# Patient Record
Sex: FEMALE | Race: WHITE | NOT HISPANIC OR LATINO | Employment: OTHER | URBAN - METROPOLITAN AREA
[De-identification: names, ages, dates, MRNs, and addresses within clinical notes are randomized per-mention and may not be internally consistent; named-entity substitution may affect disease eponyms.]

---

## 2019-12-20 ENCOUNTER — OFFICE VISIT CONVERTED (OUTPATIENT)
Dept: NEUROLOGY | Facility: CLINIC | Age: 69
End: 2019-12-20
Attending: PSYCHIATRY & NEUROLOGY

## 2019-12-20 ENCOUNTER — CONVERSION ENCOUNTER (OUTPATIENT)
Dept: NEUROLOGY | Facility: CLINIC | Age: 69
End: 2019-12-20

## 2020-07-29 ENCOUNTER — OFFICE VISIT CONVERTED (OUTPATIENT)
Dept: GASTROENTEROLOGY | Facility: CLINIC | Age: 70
End: 2020-07-29
Attending: NURSE PRACTITIONER

## 2020-07-29 ENCOUNTER — HOSPITAL ENCOUNTER (OUTPATIENT)
Dept: LAB | Facility: HOSPITAL | Age: 70
Discharge: HOME | End: 2020-07-29
Attending: NURSE PRACTITIONER

## 2020-08-01 LAB — CONV CELIAC DISEASE AB-IGA: 341 MG/DL (ref 68–408)

## 2020-08-02 LAB — TTG IGA SER-ACNC: <2 U/ML (ref 0–3)

## 2020-08-21 ENCOUNTER — HOSPITAL ENCOUNTER (OUTPATIENT)
Dept: GENERAL RADIOLOGY | Facility: HOSPITAL | Age: 70
Discharge: HOME | End: 2020-08-21
Attending: NURSE PRACTITIONER

## 2020-08-26 ENCOUNTER — HOSPITAL ENCOUNTER (OUTPATIENT)
Dept: GENERAL RADIOLOGY | Facility: HOSPITAL | Age: 70
Discharge: HOME | End: 2020-08-26
Attending: NURSE PRACTITIONER

## 2020-09-16 ENCOUNTER — HOSPITAL ENCOUNTER (OUTPATIENT)
Dept: NUCLEAR MEDICINE | Facility: HOSPITAL | Age: 70
Discharge: HOME | End: 2020-09-16
Attending: NURSE PRACTITIONER

## 2021-05-10 NOTE — H&P
History and Physical      Patient Name: Fanta Hawkins   Patient ID: [id number]   Sex: Female   YOB: 1950    Primary Care Provider: Valentin Minaya MD   Referring Provider: Valentin Minaya MD    Visit Date: July 29, 2020    Provider: JIN Gonsalez   [place] [facility] [place]   [place] Address: [address] [place] [address]  [id number]   Location Phone: [phone]          Chief Complaint  · nausea   · constipation       History Of Present Illness  Fanta Hawkins is a 69 year old /White female who presents to the office today.      New pt presents w c/o nausea x 1 yr and chronic constipation.   NO vomiting, states she awakes during night and is nauseated, also occurs in day occasionally. NO abd pain, c/o HB and acid reflux on Omeprazole 10 mg for about a year; takes Diclofenac for arthritis for several yrs. Pt states she cannot go without this.   Pt has tried Miralax and it works well if she takes daily, sometimes she forgets. May go 1 week w/o BM.  Has had very hard stools this week and small amounts. NO blood seen in stool.   Pt does have hx of colon polyps.     Past medical/surgical history significant for distal rectal perforation without peritoneal contamination in 2012 after a colonoscopy.  CT the abdomen pelvis with contrast May 2019 showed a partial small bowel obstruction  Labs CBC CMP unremarkable 6/2020       Past Medical History  Anxiety; Arthritis; Bladder Disorder; Bowel Obstruction; Colon polyp; COPD; Depression; IBS (irritable bowel syndrome); Leg pain; Limb Swelling; Lung disease; Migraine; Muscle cramps; Numbness and tingling; Reflux; Seasonal allergies         Past Surgical History  Back; Bladder Repair; bowel obstruction; Bowel Surgery; Breast biopsy; Colonoscopy; Hysterectomy; Surgical Clips         Medication List  Name Date Started Instructions   albuterol sulfate 90 mcg/actuation inhalation HFA aerosol inhaler  inhale 1 puff (90 mcg) by  "inhalation route every 6 hours as needed   buspirone 5 mg oral tablet  take 1 tablet (5 mg) by oral route 2 times per day   diclofenac sodium 50 mg oral tablet,delayed release (DR/EC)  take 1 tablet by oral route daily   Flonase Allergy Relief 50 mcg/actuation nasal spray,suspension  --    omeprazole 10 mg oral capsule,delayed release(DR/EC)  take 1 capsule by oral route daily   sertraline 25 mg oral tablet  take 1 tablet (25 mg) by oral route once daily         Allergy List  Celebrex; morphine         Family Medical History  Diabetes, unspecified type; Family history of certain chronic disabling diseases; arthritis; Osteoporosis; No family history of colorectal cancer; Family history of heart disease         Social History  Alcohol (Never); Claustophobic (Unknown); lives alone; Tobacco (Current every day);          Review of Systems  · Constitutional  o Admits  o : good general health lately, no acute distress  · Eyes  o Admits  o : cataracts  o Denies  o : glaucoma  · HENT  o Denies  o : hearing problems, trouble swallowing  · Cardiovascular  o Denies  o : chest pain, blood clots or phlebitis  · Respiratory  o Denies  o : asthma, shortness of breath  · Gastrointestinal  o Denies  o : additional gastrointestinal symptoms except as noted in the HPI  · Genitourinary  o Admits  o : kidney stone  o Denies  o : dysuria  · Integument  o Denies  o : rashes, sores  · Neurologic  o Admits  o : pt states she had a seizure with taking Avelox  o Denies  o : strokes, seizure activity  · Musculoskeletal  o Admits  o : arthritis, bone or joint pain  · Psychiatric  o Admits  o : anxiety, pleasant affect  · Heme-Lymph  o Denies  o : bleeding disorder  · Allergic-Immunologic  o Admits  o : seasonal allergies      Vitals  Date Time BP Position Site L\R Cuff Size HR RR TEMP (F) WT  HT  BMI kg/m2 BSA m2 O2 Sat        12/20/2019 11:10 /75 Sitting    85 - R   99lbs 0oz 5'  2\" 18.11 1.4     07/29/2020 09:29 /78 Sitting  " "  82 - R  98 99lbs 4oz 5'  2\" 18.15 1.4           Physical Examination  · Constitutional  o Appearance  o : well developed, well-nourished, in no acute distress  · Head and Face  o Head  o :   § Inspection  § : atraumatic, normocephalic  · Eyes  o Sclerae  o : sclerae white, no sclerae icterus  · Neck  o Inspection/Palpation  o : supple  · Respiratory  o Respiratory Effort  o : breathing unlabored  o Inspection of Chest  o : normal appearance, no retractions  o Auscultation of Lungs  o : normal breath sounds throughout bilaterally  · Cardiovascular  o Heart  o :   § Auscultation of Heart  § : regular rate, normal rhythm, no murmurs present, no pericardial friction rub  o Peripheral Vascular System  o :   § Extremities  § : no cyanosis, clubbing or edema  · Gastrointestinal  o Abdominal Examination  o : soft, nontender to palpation  · Skin and Subcutaneous Tissue  o General Inspection  o : no lesions present, no rashes present  · Neurologic  o Mental Status Examination  o :   § Orientation  § : grossly oriented to person, place and time  § Speech/Language  § : communication ability within normal limits, voice quality normal, articulation of speech normal, no evidence of aphasia  § Attention  § : attention normal, concentration abilities normal  o Sensation  o : grossly intact  o Gait and Station  o :   § Gait Screening  § : normal gait  · Psychiatric  o General  o : Alert and oriented x3  o Mood and Affect  o : Mood and affect are appropriate to circumstances          Assessment  · Constipation     564.00/K59.00  · Heartburn     787.1/R12  · History of colon polyps     V12.72/Z86.010  · Nausea alone     787.02/R11.0  · History of small bowel obstruction     V12.79/Z87.19      Plan  · Orders  o Celiac Disease Reflexive Panel [facility] (CELID) - - 07/29/2020  o Gastric Emptying Study [facility] (14527) - - 07/29/2020  · Medications  o Linzess 72 mcg oral capsule   SIG: take 1 capsule by oral route daily for 30 days take 30 min " before meal   DISP: (30) capsules with 3 refills  Prescribed on 07/29/2020     o omeprazole 10 mg oral capsule,delayed release(/EC)   SIG: take 1 capsule by oral route daily for 30 days   DISP: (30) capsule with 3 refills  Adjusted on 07/29/2020     o Medications have been Reconciled  o Transition of Care or Provider Policy  · Instructions  o Encouraged to follow-up with Primary Care Provider for preventative care.  o Patient was educated/instructed on their diagnosis, treatment and medications prior to discharge from the clinic today.  o Patient instructed to seek medical attention urgently for new or worsening symptoms.  o Follow Up: after testing  o Omeprazole increased to 20 mg/d to see if sx are improved with this. D/W pt NSAIDS can aggravate GI sx but she states she can't stop taking.   o D/W pt at length R/B of EGD/COLONOSCOPY and that these procedures were recommended w her CC and hx of colon polyps but she wants to think about it; naturally she is concerned since she has had a perforation occur. I also r/w her Dr Hernandez's personal experience with an extremely low rate of complications; risks of unknown/ incurable cancer discussed. I also offered a Cologuard screen but she wanted to think about this as well.             Electronically Signed by: JIN Gonsalez -Author on July 29, 2020 10:27:23 AM

## 2021-05-15 VITALS
DIASTOLIC BLOOD PRESSURE: 75 MMHG | HEIGHT: 62 IN | HEART RATE: 85 BPM | BODY MASS INDEX: 18.22 KG/M2 | SYSTOLIC BLOOD PRESSURE: 133 MMHG | WEIGHT: 99 LBS

## 2021-05-15 VITALS
WEIGHT: 99.25 LBS | BODY MASS INDEX: 18.26 KG/M2 | SYSTOLIC BLOOD PRESSURE: 125 MMHG | HEART RATE: 82 BPM | TEMPERATURE: 98 F | DIASTOLIC BLOOD PRESSURE: 78 MMHG | HEIGHT: 62 IN

## 2021-11-05 ENCOUNTER — OFFICE VISIT (OUTPATIENT)
Dept: CARDIOLOGY | Facility: CLINIC | Age: 71
End: 2021-11-05

## 2021-11-05 VITALS
SYSTOLIC BLOOD PRESSURE: 124 MMHG | DIASTOLIC BLOOD PRESSURE: 70 MMHG | HEART RATE: 82 BPM | WEIGHT: 97 LBS | HEIGHT: 62 IN | BODY MASS INDEX: 17.85 KG/M2

## 2021-11-05 DIAGNOSIS — R07.9 CHEST PAIN, UNSPECIFIED TYPE: Primary | ICD-10-CM

## 2021-11-05 DIAGNOSIS — Z72.0 NICOTINE USE: ICD-10-CM

## 2021-11-05 DIAGNOSIS — R06.02 SHORTNESS OF BREATH: ICD-10-CM

## 2021-11-05 PROCEDURE — 99203 OFFICE O/P NEW LOW 30 MIN: CPT | Performed by: SPECIALIST

## 2021-11-05 PROCEDURE — 93000 ELECTROCARDIOGRAM COMPLETE: CPT | Performed by: SPECIALIST

## 2021-11-05 RX ORDER — HYDROCODONE BITARTRATE AND ACETAMINOPHEN 5; 325 MG/1; MG/1
1 TABLET ORAL EVERY 12 HOURS PRN
COMMUNITY
Start: 2021-10-14

## 2021-11-05 RX ORDER — BUSPIRONE HYDROCHLORIDE 5 MG/1
5 TABLET ORAL 3 TIMES DAILY
COMMUNITY
Start: 2021-10-13 | End: 2022-08-11

## 2021-11-05 RX ORDER — OMEPRAZOLE 10 MG/1
10 CAPSULE, DELAYED RELEASE ORAL DAILY PRN
COMMUNITY
Start: 2021-10-08

## 2021-11-05 NOTE — PROGRESS NOTES
[facility]   Cardiology Consult Note    Patient Name: Fanta Hawkins  : 1950  Referring Physician: Rohini Carrillo, *  Subjective   Subjective     Reason for Consult/ Chief Complaint:   Chief Complaint   Patient presents with   • Fatigue   • Chest Discomfort     Feels like someone is sitting on her chest       HPI:  Fanta Hawkins is a 70 y.o. female with history of chest pain on and off for last 1 month.  Chest pain is substernal, sharp.  Pain, not exertional relieved spontaneously.  No exertional angina.  No shortness of breath.    Review of Systems:   Constitutional no fever,  no weight loss   Skin no rash   Otolaryngeal no difficulty swallowing   Cardiovascular See HPI   Pulmonary no cough, no sputum production   Gastrointestinal no constipation, no diarrhea   Genitourinary no dysuria, no hematuria   Hematologic no easy bruisability, no abnormal bleeding   Musculoskeletal no muscle pain   Neurologic no dizziness, no falls     Personal History     Past Medical History:  Past Medical History:   Diagnosis Date   • Hypertension        Family History: History reviewed. No pertinent family history.    Social History:  reports that she has been smoking cigarettes. She has been smoking about 0.25 packs per day. She has never used smokeless tobacco. She reports that she does not drink alcohol and does not use drugs.    Home Medications:  HYDROcodone-acetaminophen, busPIRone, diclofenac, and omeprazole    Allergies:  Allergies   Allergen Reactions   • Indomethacin Palpitations and Shortness Of Breath   • Meloxicam Itching and Shortness Of Breath   • Avelox [Moxifloxacin] Seizure   • Morphine Hives and Itching       Objective    Objective     Vitals:   Heart Rate:  [82] 82  BP: (124)/(70) 124/70  Body mass index is 17.74 kg/m².  Physical Exam:   Constitutional: Awake, alert, No acute distress    Eyes: PERRLA, sclerae anicteric, no conjunctival injection   HENT: NCAT, mucous membranes moist   Neck:  Supple, no thyromegaly, no lymphadenopathy, trachea midline   Respiratory: Clear to auscultation bilaterally, nonlabored respirations    Cardiovascular: RRR, no murmurs or rubs. Palpable pedal pulses bilaterally   Gastrointestinal: Positive bowel sounds, soft, nontender, nondistended   Musculoskeletal: No bilateral ankle edema, no clubbing or cyanosis to extremities   Psychiatric: Appropriate affect, cooperative   Neurologic: Oriented x 3, strength symmetric in all extremities, Cranial Nerves grossly intact to confrontation, speech clear   Skin: No rashes     Result Review    Result Review:  I have personally reviewed the available results:  [x]  Laboratory  [x]  EKG/Telemetry   [x]  Cardiology/Vascular   [x] Medications  [x]  Old records      EKG Reviewed shows sinus rhythm with ST segment depression.      ECG 12 Lead    Date/Time: 11/5/2021 9:33 AM  Performed by: Eric Gaston MD  Authorized by: Eric Gaston MD   Rhythm: sinus rhythm  Other findings: non-specific ST-T wave changes    Clinical impression: abnormal EKG             Impression/Plan  1.  Precordial chest pain: Sestamibi stress test to evaluate for any significant ischemia in view of abnormal EKG and positive risk factors.  2.  Shortness of breath: Echocardiogram to evaluate left-ventricular systolic function.  3.  Positive nicotine use: Smoking cessation discussed with patient.        Electronically signed by Eric Gaston MD, 11/05/21, 9:11 AM EDT.

## 2022-08-11 ENCOUNTER — APPOINTMENT (OUTPATIENT)
Dept: GENERAL RADIOLOGY | Facility: HOSPITAL | Age: 72
End: 2022-08-11

## 2022-08-11 ENCOUNTER — APPOINTMENT (OUTPATIENT)
Dept: CT IMAGING | Facility: HOSPITAL | Age: 72
End: 2022-08-11

## 2022-08-11 ENCOUNTER — HOSPITAL ENCOUNTER (INPATIENT)
Facility: HOSPITAL | Age: 72
LOS: 15 days | Discharge: HOME HEALTH | End: 2022-08-26
Attending: EMERGENCY MEDICINE

## 2022-08-11 DIAGNOSIS — R26.2 DIFFICULTY WALKING: ICD-10-CM

## 2022-08-11 DIAGNOSIS — K56.609 SMALL BOWEL OBSTRUCTION: Primary | ICD-10-CM

## 2022-08-11 LAB
ALBUMIN SERPL-MCNC: 4.2 G/DL (ref 3.5–5.2)
ALBUMIN/GLOB SERPL: 1.2 G/DL
ALP SERPL-CCNC: 91 U/L (ref 39–117)
ALT SERPL W P-5'-P-CCNC: 10 U/L (ref 1–33)
ANION GAP SERPL CALCULATED.3IONS-SCNC: 12.1 MMOL/L (ref 5–15)
AST SERPL-CCNC: 14 U/L (ref 1–32)
BASOPHILS # BLD AUTO: 0.04 10*3/MM3 (ref 0–0.2)
BASOPHILS NFR BLD AUTO: 0.3 % (ref 0–1.5)
BILIRUB SERPL-MCNC: 0.4 MG/DL (ref 0–1.2)
BUN SERPL-MCNC: 25 MG/DL (ref 8–23)
BUN/CREAT SERPL: 34.2 (ref 7–25)
CALCIUM SPEC-SCNC: 9.8 MG/DL (ref 8.6–10.5)
CHLORIDE SERPL-SCNC: 104 MMOL/L (ref 98–107)
CO2 SERPL-SCNC: 24.9 MMOL/L (ref 22–29)
CREAT SERPL-MCNC: 0.73 MG/DL (ref 0.57–1)
D-LACTATE SERPL-SCNC: 1.9 MMOL/L (ref 0.5–2)
DEPRECATED RDW RBC AUTO: 44 FL (ref 37–54)
EGFRCR SERPLBLD CKD-EPI 2021: 89.2 ML/MIN/1.73
EOSINOPHIL # BLD AUTO: 0.14 10*3/MM3 (ref 0–0.4)
EOSINOPHIL NFR BLD AUTO: 1.2 % (ref 0.3–6.2)
ERYTHROCYTE [DISTWIDTH] IN BLOOD BY AUTOMATED COUNT: 12.1 % (ref 12.3–15.4)
GLOBULIN UR ELPH-MCNC: 3.6 GM/DL
GLUCOSE SERPL-MCNC: 142 MG/DL (ref 65–99)
HCT VFR BLD AUTO: 39.4 % (ref 34–46.6)
HGB BLD-MCNC: 13.2 G/DL (ref 12–15.9)
HOLD SPECIMEN: NORMAL
HOLD SPECIMEN: NORMAL
IMM GRANULOCYTES # BLD AUTO: 0.04 10*3/MM3 (ref 0–0.05)
IMM GRANULOCYTES NFR BLD AUTO: 0.3 % (ref 0–0.5)
LIPASE SERPL-CCNC: 31 U/L (ref 13–60)
LYMPHOCYTES # BLD AUTO: 1.33 10*3/MM3 (ref 0.7–3.1)
LYMPHOCYTES NFR BLD AUTO: 11.3 % (ref 19.6–45.3)
MCH RBC QN AUTO: 32.9 PG (ref 26.6–33)
MCHC RBC AUTO-ENTMCNC: 33.5 G/DL (ref 31.5–35.7)
MCV RBC AUTO: 98.3 FL (ref 79–97)
MONOCYTES # BLD AUTO: 0.78 10*3/MM3 (ref 0.1–0.9)
MONOCYTES NFR BLD AUTO: 6.6 % (ref 5–12)
NEUTROPHILS NFR BLD AUTO: 80.3 % (ref 42.7–76)
NEUTROPHILS NFR BLD AUTO: 9.42 10*3/MM3 (ref 1.7–7)
NRBC BLD AUTO-RTO: 0 /100 WBC (ref 0–0.2)
PLATELET # BLD AUTO: 260 10*3/MM3 (ref 140–450)
PMV BLD AUTO: 9 FL (ref 6–12)
POTASSIUM SERPL-SCNC: 3.8 MMOL/L (ref 3.5–5.2)
PROT SERPL-MCNC: 7.8 G/DL (ref 6–8.5)
RBC # BLD AUTO: 4.01 10*6/MM3 (ref 3.77–5.28)
SODIUM SERPL-SCNC: 141 MMOL/L (ref 136–145)
TROPONIN T SERPL-MCNC: <0.01 NG/ML (ref 0–0.03)
WBC NRBC COR # BLD: 11.75 10*3/MM3 (ref 3.4–10.8)
WHOLE BLOOD HOLD COAG: NORMAL
WHOLE BLOOD HOLD SPECIMEN: NORMAL

## 2022-08-11 PROCEDURE — 25010000002 ONDANSETRON PER 1 MG: Performed by: EMERGENCY MEDICINE

## 2022-08-11 PROCEDURE — 25010000002 HYDROMORPHONE 1 MG/ML SOLUTION: Performed by: INTERNAL MEDICINE

## 2022-08-11 PROCEDURE — 74018 RADEX ABDOMEN 1 VIEW: CPT

## 2022-08-11 PROCEDURE — 25010000002 ENOXAPARIN PER 10 MG: Performed by: INTERNAL MEDICINE

## 2022-08-11 PROCEDURE — 93005 ELECTROCARDIOGRAM TRACING: CPT | Performed by: EMERGENCY MEDICINE

## 2022-08-11 PROCEDURE — 93010 ELECTROCARDIOGRAM REPORT: CPT | Performed by: INTERNAL MEDICINE

## 2022-08-11 PROCEDURE — 83690 ASSAY OF LIPASE: CPT | Performed by: EMERGENCY MEDICINE

## 2022-08-11 PROCEDURE — 74177 CT ABD & PELVIS W/CONTRAST: CPT

## 2022-08-11 PROCEDURE — 0 IOPAMIDOL PER 1 ML: Performed by: EMERGENCY MEDICINE

## 2022-08-11 PROCEDURE — 99285 EMERGENCY DEPT VISIT HI MDM: CPT

## 2022-08-11 PROCEDURE — 80053 COMPREHEN METABOLIC PANEL: CPT | Performed by: EMERGENCY MEDICINE

## 2022-08-11 PROCEDURE — 83605 ASSAY OF LACTIC ACID: CPT | Performed by: EMERGENCY MEDICINE

## 2022-08-11 PROCEDURE — 84484 ASSAY OF TROPONIN QUANT: CPT | Performed by: EMERGENCY MEDICINE

## 2022-08-11 PROCEDURE — 25010000002 ONDANSETRON PER 1 MG: Performed by: INTERNAL MEDICINE

## 2022-08-11 PROCEDURE — 85025 COMPLETE CBC W/AUTO DIFF WBC: CPT | Performed by: EMERGENCY MEDICINE

## 2022-08-11 PROCEDURE — 25010000002 FENTANYL CITRATE (PF) 50 MCG/ML SOLUTION: Performed by: EMERGENCY MEDICINE

## 2022-08-11 RX ORDER — ONDANSETRON 2 MG/ML
4 INJECTION INTRAMUSCULAR; INTRAVENOUS ONCE
Status: COMPLETED | OUTPATIENT
Start: 2022-08-11 | End: 2022-08-11

## 2022-08-11 RX ORDER — HYDROCODONE BITARTRATE AND ACETAMINOPHEN 5; 325 MG/1; MG/1
1 TABLET ORAL EVERY 4 HOURS PRN
Status: DISCONTINUED | OUTPATIENT
Start: 2022-08-11 | End: 2022-08-13

## 2022-08-11 RX ORDER — ACETAMINOPHEN 650 MG/1
650 SUPPOSITORY RECTAL EVERY 4 HOURS PRN
Status: DISCONTINUED | OUTPATIENT
Start: 2022-08-11 | End: 2022-08-11

## 2022-08-11 RX ORDER — ONDANSETRON 2 MG/ML
4 INJECTION INTRAMUSCULAR; INTRAVENOUS EVERY 6 HOURS PRN
Status: DISCONTINUED | OUTPATIENT
Start: 2022-08-11 | End: 2022-08-12

## 2022-08-11 RX ORDER — ACETAMINOPHEN 325 MG/1
650 TABLET ORAL EVERY 4 HOURS PRN
Status: DISCONTINUED | OUTPATIENT
Start: 2022-08-11 | End: 2022-08-11

## 2022-08-11 RX ORDER — HYDROCODONE BITARTRATE AND ACETAMINOPHEN 5; 325 MG/1; MG/1
1 TABLET ORAL EVERY 4 HOURS PRN
Status: DISCONTINUED | OUTPATIENT
Start: 2022-08-11 | End: 2022-08-11

## 2022-08-11 RX ORDER — FLUTICASONE PROPIONATE 50 MCG
1 SPRAY, SUSPENSION (ML) NASAL DAILY PRN
COMMUNITY

## 2022-08-11 RX ORDER — AMOXICILLIN 250 MG
2 CAPSULE ORAL 2 TIMES DAILY
Status: DISCONTINUED | OUTPATIENT
Start: 2022-08-11 | End: 2022-08-11

## 2022-08-11 RX ORDER — NALOXONE HCL 0.4 MG/ML
0.4 VIAL (ML) INJECTION
Status: DISCONTINUED | OUTPATIENT
Start: 2022-08-11 | End: 2022-08-26 | Stop reason: HOSPADM

## 2022-08-11 RX ORDER — ALPRAZOLAM 0.25 MG/1
0.5 TABLET ORAL EVERY 8 HOURS PRN
Status: DISCONTINUED | OUTPATIENT
Start: 2022-08-11 | End: 2022-08-13

## 2022-08-11 RX ORDER — FAMOTIDINE 10 MG/ML
20 INJECTION, SOLUTION INTRAVENOUS ONCE
Status: COMPLETED | OUTPATIENT
Start: 2022-08-11 | End: 2022-08-11

## 2022-08-11 RX ORDER — SODIUM CHLORIDE 0.9 % (FLUSH) 0.9 %
10 SYRINGE (ML) INJECTION EVERY 12 HOURS SCHEDULED
Status: DISCONTINUED | OUTPATIENT
Start: 2022-08-11 | End: 2022-08-26 | Stop reason: HOSPADM

## 2022-08-11 RX ORDER — BISACODYL 10 MG
10 SUPPOSITORY, RECTAL RECTAL DAILY PRN
Status: DISCONTINUED | OUTPATIENT
Start: 2022-08-11 | End: 2022-08-11

## 2022-08-11 RX ORDER — SODIUM CHLORIDE, SODIUM LACTATE, POTASSIUM CHLORIDE, CALCIUM CHLORIDE 600; 310; 30; 20 MG/100ML; MG/100ML; MG/100ML; MG/100ML
75 INJECTION, SOLUTION INTRAVENOUS CONTINUOUS
Status: DISCONTINUED | OUTPATIENT
Start: 2022-08-11 | End: 2022-08-14

## 2022-08-11 RX ORDER — MECLIZINE HYDROCHLORIDE 25 MG/1
TABLET ORAL
COMMUNITY
End: 2022-08-11

## 2022-08-11 RX ORDER — ACETAMINOPHEN 650 MG/1
650 SUPPOSITORY RECTAL EVERY 4 HOURS PRN
Status: DISCONTINUED | OUTPATIENT
Start: 2022-08-11 | End: 2022-08-13

## 2022-08-11 RX ORDER — POLYETHYLENE GLYCOL 3350 17 G/17G
17 POWDER, FOR SOLUTION ORAL DAILY PRN
Status: DISCONTINUED | OUTPATIENT
Start: 2022-08-11 | End: 2022-08-13

## 2022-08-11 RX ORDER — ACETAMINOPHEN 325 MG/1
650 TABLET ORAL EVERY 4 HOURS PRN
Status: DISCONTINUED | OUTPATIENT
Start: 2022-08-11 | End: 2022-08-13

## 2022-08-11 RX ORDER — HYDROCODONE BITARTRATE AND ACETAMINOPHEN 10; 325 MG/1; MG/1
1 TABLET ORAL EVERY 4 HOURS PRN
Status: DISCONTINUED | OUTPATIENT
Start: 2022-08-11 | End: 2022-08-13

## 2022-08-11 RX ORDER — PSEUDOEPHEDRINE HCL 30 MG
100 TABLET ORAL 2 TIMES DAILY PRN
COMMUNITY

## 2022-08-11 RX ORDER — SODIUM CHLORIDE 0.9 % (FLUSH) 0.9 %
10 SYRINGE (ML) INJECTION AS NEEDED
Status: DISCONTINUED | OUTPATIENT
Start: 2022-08-11 | End: 2022-08-26 | Stop reason: HOSPADM

## 2022-08-11 RX ORDER — ENOXAPARIN SODIUM 100 MG/ML
30 INJECTION SUBCUTANEOUS EVERY 24 HOURS
Status: DISCONTINUED | OUTPATIENT
Start: 2022-08-11 | End: 2022-08-11

## 2022-08-11 RX ORDER — HYDROCODONE BITARTRATE AND ACETAMINOPHEN 10; 325 MG/1; MG/1
1 TABLET ORAL EVERY 4 HOURS PRN
Status: DISCONTINUED | OUTPATIENT
Start: 2022-08-11 | End: 2022-08-11

## 2022-08-11 RX ORDER — ALUMINA, MAGNESIA, AND SIMETHICONE 2400; 2400; 240 MG/30ML; MG/30ML; MG/30ML
15 SUSPENSION ORAL EVERY 6 HOURS PRN
Status: DISCONTINUED | OUTPATIENT
Start: 2022-08-11 | End: 2022-08-11

## 2022-08-11 RX ORDER — ALUMINA, MAGNESIA, AND SIMETHICONE 2400; 2400; 240 MG/30ML; MG/30ML; MG/30ML
15 SUSPENSION ORAL EVERY 6 HOURS PRN
Status: DISCONTINUED | OUTPATIENT
Start: 2022-08-11 | End: 2022-08-13

## 2022-08-11 RX ORDER — FAMOTIDINE 20 MG/1
40 TABLET, FILM COATED ORAL DAILY
Status: DISCONTINUED | OUTPATIENT
Start: 2022-08-11 | End: 2022-08-11

## 2022-08-11 RX ORDER — POLYETHYLENE GLYCOL 3350 17 G/17G
17 POWDER, FOR SOLUTION ORAL DAILY PRN
Status: DISCONTINUED | OUTPATIENT
Start: 2022-08-11 | End: 2022-08-11

## 2022-08-11 RX ORDER — FAMOTIDINE 20 MG/1
40 TABLET, FILM COATED ORAL DAILY
Status: DISCONTINUED | OUTPATIENT
Start: 2022-08-11 | End: 2022-08-13

## 2022-08-11 RX ORDER — BISACODYL 5 MG/1
5 TABLET, DELAYED RELEASE ORAL DAILY PRN
Status: DISCONTINUED | OUTPATIENT
Start: 2022-08-11 | End: 2022-08-11

## 2022-08-11 RX ORDER — AMOXICILLIN 250 MG
2 CAPSULE ORAL 2 TIMES DAILY
Status: DISCONTINUED | OUTPATIENT
Start: 2022-08-11 | End: 2022-08-13

## 2022-08-11 RX ORDER — ACETAMINOPHEN 160 MG/5ML
650 SOLUTION ORAL EVERY 4 HOURS PRN
Status: DISCONTINUED | OUTPATIENT
Start: 2022-08-11 | End: 2022-08-11

## 2022-08-11 RX ORDER — ACETAMINOPHEN 160 MG/5ML
650 SOLUTION ORAL EVERY 4 HOURS PRN
Status: DISCONTINUED | OUTPATIENT
Start: 2022-08-11 | End: 2022-08-13

## 2022-08-11 RX ORDER — FENTANYL CITRATE 50 UG/ML
50 INJECTION, SOLUTION INTRAMUSCULAR; INTRAVENOUS ONCE
Status: COMPLETED | OUTPATIENT
Start: 2022-08-11 | End: 2022-08-11

## 2022-08-11 RX ORDER — CHOLECALCIFEROL (VITAMIN D3) 125 MCG
5 CAPSULE ORAL NIGHTLY PRN
Status: DISCONTINUED | OUTPATIENT
Start: 2022-08-11 | End: 2022-08-11

## 2022-08-11 RX ORDER — ALPRAZOLAM 0.25 MG/1
0.5 TABLET ORAL EVERY 8 HOURS PRN
Status: DISCONTINUED | OUTPATIENT
Start: 2022-08-11 | End: 2022-08-11

## 2022-08-11 RX ORDER — CHOLECALCIFEROL (VITAMIN D3) 125 MCG
5 CAPSULE ORAL NIGHTLY PRN
Status: DISCONTINUED | OUTPATIENT
Start: 2022-08-11 | End: 2022-08-13

## 2022-08-11 RX ORDER — ALBUTEROL SULFATE 90 UG/1
2 AEROSOL, METERED RESPIRATORY (INHALATION) EVERY 4 HOURS PRN
COMMUNITY

## 2022-08-11 RX ORDER — IPRATROPIUM BROMIDE AND ALBUTEROL SULFATE 2.5; .5 MG/3ML; MG/3ML
3 SOLUTION RESPIRATORY (INHALATION) EVERY 4 HOURS PRN
Status: DISCONTINUED | OUTPATIENT
Start: 2022-08-11 | End: 2022-08-26 | Stop reason: HOSPADM

## 2022-08-11 RX ORDER — BISACODYL 10 MG
10 SUPPOSITORY, RECTAL RECTAL DAILY PRN
Status: DISCONTINUED | OUTPATIENT
Start: 2022-08-11 | End: 2022-08-13

## 2022-08-11 RX ORDER — ENOXAPARIN SODIUM 100 MG/ML
40 INJECTION SUBCUTANEOUS EVERY 24 HOURS
Status: DISCONTINUED | OUTPATIENT
Start: 2022-08-11 | End: 2022-08-16

## 2022-08-11 RX ORDER — SODIUM CHLORIDE 9 MG/ML
40 INJECTION, SOLUTION INTRAVENOUS AS NEEDED
Status: DISCONTINUED | OUTPATIENT
Start: 2022-08-11 | End: 2022-08-26 | Stop reason: HOSPADM

## 2022-08-11 RX ADMIN — Medication 10 ML: at 19:32

## 2022-08-11 RX ADMIN — HYDROMORPHONE HYDROCHLORIDE 0.5 MG: 1 INJECTION, SOLUTION INTRAMUSCULAR; INTRAVENOUS; SUBCUTANEOUS at 10:44

## 2022-08-11 RX ADMIN — FAMOTIDINE 40 MG: 20 TABLET ORAL at 11:52

## 2022-08-11 RX ADMIN — HYDROMORPHONE HYDROCHLORIDE 0.5 MG: 1 INJECTION, SOLUTION INTRAMUSCULAR; INTRAVENOUS; SUBCUTANEOUS at 16:33

## 2022-08-11 RX ADMIN — SODIUM CHLORIDE 1000 ML: 9 INJECTION, SOLUTION INTRAVENOUS at 07:36

## 2022-08-11 RX ADMIN — ONDANSETRON 4 MG: 2 INJECTION INTRAMUSCULAR; INTRAVENOUS at 06:22

## 2022-08-11 RX ADMIN — SODIUM CHLORIDE, POTASSIUM CHLORIDE, SODIUM LACTATE AND CALCIUM CHLORIDE 100 ML/HR: 600; 310; 30; 20 INJECTION, SOLUTION INTRAVENOUS at 11:52

## 2022-08-11 RX ADMIN — ONDANSETRON 4 MG: 2 INJECTION INTRAMUSCULAR; INTRAVENOUS at 13:40

## 2022-08-11 RX ADMIN — SENNOSIDES AND DOCUSATE SODIUM 2 TABLET: 50; 8.6 TABLET ORAL at 20:41

## 2022-08-11 RX ADMIN — ONDANSETRON 4 MG: 2 INJECTION INTRAMUSCULAR; INTRAVENOUS at 09:35

## 2022-08-11 RX ADMIN — Medication 10 ML: at 11:52

## 2022-08-11 RX ADMIN — ONDANSETRON 4 MG: 2 INJECTION INTRAMUSCULAR; INTRAVENOUS at 19:32

## 2022-08-11 RX ADMIN — ONDANSETRON 4 MG: 2 INJECTION INTRAMUSCULAR; INTRAVENOUS at 07:36

## 2022-08-11 RX ADMIN — ENOXAPARIN SODIUM 40 MG: 100 INJECTION SUBCUTANEOUS at 11:52

## 2022-08-11 RX ADMIN — IOPAMIDOL 100 ML: 755 INJECTION, SOLUTION INTRAVENOUS at 08:12

## 2022-08-11 RX ADMIN — FAMOTIDINE 20 MG: 10 INJECTION INTRAVENOUS at 07:36

## 2022-08-11 RX ADMIN — FENTANYL CITRATE 50 MCG: 50 INJECTION, SOLUTION INTRAMUSCULAR; INTRAVENOUS at 07:36

## 2022-08-11 NOTE — CONSULTS
"Nutrition Services    Patient Name: Fanta Hawkins  YOB: 1950  MRN: [id number]  Admission date: 8/11/2022      CLINICAL NUTRITION ASSESSMENT      Reason for Assessment  Identified at risk by screening criteria   H&P:    Past Medical History:   Diagnosis Date   • Arthritis    • Asthma    • Injury of back         Current Problems:   Active Hospital Problems    Diagnosis    • Small bowel obstruction (HCC)         Nutrition/Diet History         Narrative     RD consult per nursing screening, however all nutrition screens wnl.  Pt admitted for SBO with NG for suction initially.  Per review of chart wt 115#, up drastically from wt in Nov of 97#.  RD will monitor intake to determine if any nutrition intervention required.     Anthropometrics        Current Height, Weight Height: 157.5 cm (62\")  Weight: 52.6 kg (115 lb 15.4 oz)   Current BMI Body mass index is 21.21 kg/m².       Weight Hx  Wt Readings from Last 30 Encounters:   08/11/22 1137 52.6 kg (115 lb 15.4 oz)   08/11/22 0623 52.3 kg (115 lb 4.8 oz)   11/05/21 0839 44 kg (97 lb)   07/29/20 0000 45 kg (99 lb 4 oz)   12/20/19 0000 44.9 kg (99 lb)            Wt Change Observation 18# wt gain since Nov     Estimated/Assessed Needs       Energy Requirements    EST Needs (kcal/day) 0931-6671       Protein Requirements    EST Daily Needs (g/day) 53-63       Fluid Requirements     Estimated Needs (mL/day) 1406     Labs/Medications         Pertinent Labs Reviewed.   Results from last 7 days   Lab Units 08/11/22  0616   SODIUM mmol/L 141   POTASSIUM mmol/L 3.8   CHLORIDE mmol/L 104   CO2 mmol/L 24.9   BUN mg/dL 25*   CREATININE mg/dL 0.73   CALCIUM mg/dL 9.8   BILIRUBIN mg/dL 0.4   ALK PHOS U/L 91   ALT (SGPT) U/L 10   AST (SGOT) U/L 14   GLUCOSE mg/dL 142*     Results from last 7 days   Lab Units 08/11/22  0616   HEMOGLOBIN g/dL 13.2   HEMATOCRIT % 39.4       Pertinent Medications Reviewed.     Current Nutrition Orders & Evaluation of Intake       Oral " Nutrition     Current PO Diet Diet Regular   Supplement No active supplement orders       Nutrition Diagnosis         Nutrition Dx Problem 1 No nutrition diagnosis at this time      Nutrition Intervention         Diet per MD     Medical Nutrition Therapy/Nutrition Education          Learner     Readiness Patient  Education not indicated at this time     Monitor/Evaluation        Monitor PO intake, Pertinent labs, Weight, Diet advancement     Nutrition Discharge Plan         No nutrition discharge needs identified at this time     Electronically signed by:  Ave Bustamante RD  08/11/22 14:20 EDT

## 2022-08-11 NOTE — H&P
[facility]   HISTORY AND PHYSICAL    Patient Name: Fanta Hawkins  : 1950  MRN: [id number]  Primary Care Physician:  Eduard Minaya MD  Date of admission: 2022    Subjective   Subjective     Chief Complaint: Abdominal pain    HPI:    Fanta Hawkins is a 71 y.o. female With past medical history significant for COPD osteoarthritis came to the emergency room complaining of nausea vomiting abdominal pain,CT of the abdomen and pelvis showed small bowel obstruction and because of that reason I was contacted to admit the patient.  I saw the patient in emergency room and she denies any fevers chills headache but she is complaining of abdominal pain and severe nausea and have vomited few times    Review of Systems  Constitutional:        Weakness tiredness fatigue  Eyes:                       No blurry vision, eye discharge, eye irritation, eye pain  HEENT:                   No acute hair loss, earache and discharge, nasal congestion or discharge, sore throat, postnasal drip  Respiratory:           No shortness of breath coughing sputum production wheezing hemoptysis pleuritic chest pain  Cardiovascular:     No chest pain, orthopnea, PND, dizziness, palpitation, lower extremity edema  Gastrointestinal:   Nausea vomiting abdominal pain  Genitourinary:       No urinary incontinence, hesitancy, frequency, urgency, dysuria  Neurological:        No confusion, headache, focal weakness, numbness, dysphasia  Hematologic:         No bruising, bleeding, pallor, lymphadenopathy  Endocrine:            No coldness, hot flashes, polyuria, abnormal hair growth  Musculoskeletal:    No body pains, aches, arthritic pains, muscle pain ,muscle wasting  Psychiatric:          No low or high mood, anxiety, hallucinations, delusions  Skin.                      No rash, ulcers, bruising, itching    Personal History     Past Medical History:   Diagnosis Date   • Arthritis    • Asthma    • Injury of back        Past  Surgical History:   Procedure Laterality Date   • BACK SURGERY     • COLONOSCOPY     • EYE SURGERY     • HYSTERECTOMY         Family History: family history is not on file. Otherwise pertinent FHx was reviewed and not pertinent to current issue.    Social History:  reports that she has been smoking cigarettes. She has been smoking about 0.25 packs per day. She has never used smokeless tobacco. She reports that she does not drink alcohol and does not use drugs.    Home Medications:  HYDROcodone-acetaminophen, albuterol sulfate HFA, busPIRone, diclofenac, docusate sodium, fluticasone, meclizine, and omeprazole      Allergies:  Allergies   Allergen Reactions   • Indomethacin Palpitations and Shortness Of Breath   • Meloxicam Itching and Shortness Of Breath   • Avelox [Moxifloxacin] Seizure   • Morphine Hives and Itching       Objective   Objective     Vitals:   Temp:  [97.6 °F (36.4 °C)] 97.6 °F (36.4 °C)  Heart Rate:  [75-85] 85  Resp:  [16-18] 18  BP: (151-173)/(81-86) 152/81  Flow (L/min):  [2] 2  Physical Exam               Constitutional:         Awake, alert responsive, conversant, no obvious distress   Eyes:                       PERRLA, sclerae anicteric, no conjunctival injection   HEENT:                   Moist mucous membranes, no nasal or eye discharge, no throat congestion   Neck:                      Supple, no thyromegaly, no lymphadenopathy, trachea midline, no elevated JVD   Respiratory:           Clear to auscultation bilaterally, nonlabored respirations    Cardiovascular:     RRR, no murmurs, rubs, or gallops, palpable pedal pulses bilaterally,No bilateral ankle edema   Gastrointestinal:   Mild abdominal distention and very mild abdominal tenderness   Musculoskeletal:   No clubbing or cyanosis to extremities, muscle wasting, joint swelling, muscle weakness   Psychiatric:             Appropriate affect, cooperative   Neurologic:            Awake alert ,oriented x 3, strength symmetric in all  extremities, Cranial Nerves grossly intact to confrontation, speech clear   Skin:                      No rashes, bruising, skin ulcers, petechiae or ecchymosis    Result Review    Result Review:  I have personally reviewed the results from the time of this admission to 8/11/2022 09:33 EDT and agree with these findings:  []  Laboratory  []  Microbiology  []  Radiology  []  EKG/Telemetry   []  Cardiology/Vascular   []  Pathology  []  Old records  []  Other:    Assessment & Plan   Assessment / Plan     Active Hospital Problems:  Active Hospital Problems    Diagnosis    • Small bowel obstruction (HCC)        Plan:   Insert NG tube and intermittent suction  IV fluids  Hopefully patient's bowel obstruction will resolve by conservative means and may not require surgery    DVT prophylaxis:  Medical DVT prophylaxis orders are present.    CODE STATUS:    Code Status (Patient has no pulse and is not breathing): CPR (Attempt to Resuscitate)  Medical Interventions (Patient has pulse or is breathing): Full Support    Admission Status:  I believe this patient meets Inpatient status.    Electronically signed by Vish Vieira MD, 08/11/22, 9:33 AM EDT.

## 2022-08-11 NOTE — ED PROVIDER NOTES
Time: 7:16 AM EDT  Arrived by: private car  Chief Complaint: Abdominal pain, vomiting  History provided by: Patient  History is limited by: N/A     History of Present Illness:  Patient is a 71 y.o. year old female who presents to the emergency department with sudden onset of upper abdominal pain last night at 11pm, with chills, nausea and multiple episodes of emesis. Pt notes normal BM, denies diarrhea, hematochezia and melena. Pt reports past cholecystectomy.Pt denies dysuria, hematuria, urinary urgency, hesitancy or frequency. Pt denies fever, nasal congestion, rhinorrhea, sore throat, cough and SOB.       History provided by:  Patient   used: No        Similar Symptoms Previously: No  Recently seen: No      Patient Care Team  Primary Care Provider: Eduard Minaya MD    Past Medical History:     Allergies   Allergen Reactions   • Indomethacin Palpitations and Shortness Of Breath   • Meloxicam Itching and Shortness Of Breath   • Avelox [Moxifloxacin] Seizure   • Morphine Hives and Itching     Past Medical History:   Diagnosis Date   • Arthritis    • Asthma    • Injury of back      Past Surgical History:   Procedure Laterality Date   • BACK SURGERY     • COLONOSCOPY     • EYE SURGERY     • HYSTERECTOMY       History reviewed. No pertinent family history.    Home Medications:  Prior to Admission medications    Medication Sig Start Date End Date Taking? Authorizing Provider   albuterol sulfate HFA (Ventolin HFA) 108 (90 Base) MCG/ACT inhaler Ventolin HFA 90 mcg/actuation aerosol inhaler   INHALE 2 PUFFS BY MOUTH EVERY 4 HOURS AS NEEDED    Senia Garcia MD   busPIRone (BUSPAR) 5 MG tablet Take 5 mg by mouth 3 (Three) Times a Day. 10/13/21   Senia Garcia MD   diclofenac (VOLTAREN) 50 MG EC tablet TAKE 1 TABLET BY MOUTH EVERY DAY WITH FOOD OR MILK AS NEEDED 10/8/21   Senia Garcia MD   docusate sodium 100 MG capsule docusate sodium 100 mg capsule   TAKE 1 CAPSULE BY  MOUTH TWICE DAILY AS NEEDED    Senia Garcia MD   fluticasone (FLONASE) 50 MCG/ACT nasal spray fluticasone propionate 50 mcg/actuation nasal spray,suspension   SHAKE LIQUID AND USE 1 SPRAY IN EACH NOSTRIL EVERY DAY    Senia Garcia MD   HYDROcodone-acetaminophen (NORCO) 5-325 MG per tablet Take 1 tablet by mouth Every 12 (Twelve) Hours As Needed. 10/14/21   Senia Garcia MD   meclizine (ANTIVERT) 25 MG tablet meclizine 25 mg tablet   TAKE 1 TABLET BY MOUTH EVERY DAY AS NEEDED    Senia Garcia MD   omeprazole (prilOSEC) 10 MG capsule Take 10 mg by mouth Daily. 10/8/21   Senia Garcia MD        Social History:   Social History     Tobacco Use   • Smoking status: Current Every Day Smoker     Packs/day: 0.25     Types: Cigarettes   • Smokeless tobacco: Never Used   Vaping Use   • Vaping Use: Never used   Substance Use Topics   • Alcohol use: Never   • Drug use: Never         Review of Systems:  Review of Systems   Constitutional: Positive for chills. Negative for activity change, fatigue, fever and unexpected weight change.   HENT: Negative for congestion, rhinorrhea, sinus pressure, sore throat and trouble swallowing.    Eyes: Negative for pain, discharge, redness and visual disturbance.   Respiratory: Negative for cough, chest tightness, shortness of breath and wheezing.    Cardiovascular: Negative for chest pain and palpitations.   Gastrointestinal: Positive for abdominal pain (upper abdominal pain), nausea and vomiting. Negative for blood in stool, constipation and diarrhea.   Endocrine: Negative for cold intolerance and polydipsia.   Genitourinary: Negative for difficulty urinating, dysuria, frequency, hematuria and urgency.   Musculoskeletal: Negative for arthralgias, joint swelling, neck pain and neck stiffness.   Skin: Negative for color change and rash.   Allergic/Immunologic: Negative for environmental allergies and immunocompromised state.   Neurological: Negative for  "dizziness, weakness and light-headedness.   Hematological: Does not bruise/bleed easily.   Psychiatric/Behavioral: Negative for agitation, confusion, dysphoric mood and suicidal ideas.        Physical Exam:  /61 (BP Location: Right arm, Patient Position: Lying)   Pulse 73   Temp 97.9 °F (36.6 °C) (Oral)   Resp 18   Ht 157.5 cm (62\")   Wt 52.6 kg (115 lb 15.4 oz)   SpO2 97%   BMI 21.21 kg/m²     Physical Exam  Vitals and nursing note reviewed.   Constitutional:       General: She is not in acute distress.     Appearance: Normal appearance. She is not toxic-appearing.      Comments: Moderate discomfort   HENT:      Head: Normocephalic and atraumatic.      Jaw: There is normal jaw occlusion.   Eyes:      General: Lids are normal.      Extraocular Movements: Extraocular movements intact.      Conjunctiva/sclera: Conjunctivae normal.      Pupils: Pupils are equal, round, and reactive to light.   Cardiovascular:      Rate and Rhythm: Normal rate and regular rhythm.      Pulses: Normal pulses.      Heart sounds: Normal heart sounds.   Pulmonary:      Effort: Pulmonary effort is normal. No respiratory distress.      Breath sounds: Normal breath sounds. No wheezing or rhonchi.   Abdominal:      General: Abdomen is flat. Bowel sounds are decreased.      Palpations: Abdomen is soft.      Tenderness: There is abdominal tenderness in the epigastric area. There is guarding (mild). There is no rebound.   Musculoskeletal:         General: Normal range of motion.      Cervical back: Normal range of motion and neck supple.      Right lower leg: No edema.      Left lower leg: No edema.   Skin:     General: Skin is warm and dry.   Neurological:      Mental Status: She is alert and oriented to person, place, and time. Mental status is at baseline.   Psychiatric:         Mood and Affect: Mood normal.                Medications in the Emergency Department:  Medications   sodium chloride 0.9 % flush 10 mL (has no " administration in time range)   sodium chloride 0.9 % flush 10 mL (has no administration in time range)   sodium chloride 0.9 % flush 10 mL (10 mL Intravenous Given 8/11/22 1152)   sodium chloride 0.9 % infusion 40 mL (has no administration in time range)   HYDROmorphone (DILAUDID) injection 0.5 mg (0.5 mg Intravenous Given 8/11/22 1044)     And   naloxone (NARCAN) injection 0.4 mg (has no administration in time range)   ondansetron (ZOFRAN) injection 4 mg (4 mg Intravenous Given 8/11/22 1340)   lactated ringers infusion (100 mL/hr Intravenous New Bag 8/11/22 1152)   ipratropium-albuterol (DUO-NEB) nebulizer solution 3 mL (has no administration in time range)   acetaminophen (TYLENOL) tablet 650 mg (has no administration in time range)     Or   acetaminophen (TYLENOL) 160 MG/5ML solution 650 mg (has no administration in time range)     Or   acetaminophen (TYLENOL) suppository 650 mg (has no administration in time range)   ALPRAZolam (XANAX) tablet 0.5 mg (has no administration in time range)   aluminum-magnesium hydroxide-simethicone (MAALOX MAX) 400-400-40 MG/5ML suspension 15 mL (has no administration in time range)   famotidine (PEPCID) tablet 40 mg (40 mg Nasogastric Given 8/11/22 1152)   HYDROcodone-acetaminophen (NORCO)  MG per tablet 1 tablet (has no administration in time range)   HYDROcodone-acetaminophen (NORCO) 5-325 MG per tablet 1 tablet (has no administration in time range)   melatonin tablet 5 mg (has no administration in time range)   sennosides-docusate (PERICOLACE) 8.6-50 MG per tablet 2 tablet (has no administration in time range)     And   polyethylene glycol (MIRALAX) packet 17 g (has no administration in time range)     And   bisacodyl (DULCOLAX) suppository 10 mg (has no administration in time range)   Enoxaparin Sodium (LOVENOX) syringe 40 mg (40 mg Subcutaneous Given 8/11/22 1152)   ondansetron (ZOFRAN) injection 4 mg (4 mg Intravenous Given 8/11/22 0622)   sodium chloride 0.9 % bolus  1,000 mL (0 mL Intravenous Stopped 8/11/22 0806)   ondansetron (ZOFRAN) injection 4 mg (4 mg Intravenous Given 8/11/22 0736)   famotidine (PEPCID) injection 20 mg (20 mg Intravenous Given 8/11/22 0736)   fentaNYL citrate (PF) (SUBLIMAZE) injection 50 mcg (50 mcg Intravenous Given 8/11/22 0736)   iopamidol (ISOVUE-370) 76 % injection 100 mL (100 mL Intravenous Given 8/11/22 0812)   ondansetron (ZOFRAN) injection 4 mg (4 mg Intravenous Given 8/11/22 0935)        Labs  Lab Results (last 24 hours)     Procedure Component Value Units Date/Time    CBC & Differential [[id number]]  (Abnormal) Collected: 08/11/22 0616    Specimen: Blood Updated: 08/11/22 0627    Narrative:      The following orders were created for panel order CBC & Differential.  Procedure                               Abnormality         Status                     ---------                               -----------         ------                     CBC Auto Differential[[id number]]        Abnormal            Final result                 Please view results for these tests on the individual orders.    Comprehensive Metabolic Panel [[id number]]  (Abnormal) Collected: 08/11/22 0616    Specimen: Blood Updated: 08/11/22 0650     Glucose 142 mg/dL      BUN 25 mg/dL      Creatinine 0.73 mg/dL      Sodium 141 mmol/L      Potassium 3.8 mmol/L      Chloride 104 mmol/L      CO2 24.9 mmol/L      Calcium 9.8 mg/dL      Total Protein 7.8 g/dL      Albumin 4.20 g/dL      ALT (SGPT) 10 U/L      AST (SGOT) 14 U/L      Alkaline Phosphatase 91 U/L      Total Bilirubin 0.4 mg/dL      Globulin 3.6 gm/dL      A/G Ratio 1.2 g/dL      BUN/Creatinine Ratio 34.2     Anion Gap 12.1 mmol/L      eGFR 89.2 mL/min/1.73      Comment: National Kidney Foundation and American Society of Nephrology (ASN) Task Force recommended calculation based on the Chronic Kidney Disease Epidemiology Collaboration (CKD-EPI) equation refit without adjustment for race.       Narrative:      GFR Normal  >60  Chronic Kidney Disease <60  Kidney Failure <15      Lipase [[id number]]  (Normal) Collected: 08/11/22 0616    Specimen: Blood Updated: 08/11/22 0650     Lipase 31 U/L     Troponin [[id number]]  (Normal) Collected: 08/11/22 0616    Specimen: Blood Updated: 08/11/22 0650     Troponin T <0.010 ng/mL     Narrative:      Troponin T Reference Range:  <= 0.03 ng/mL-   Negative for AMI  >0.03 ng/mL-     Abnormal for myocardial necrosis.  Clinicians would have to utilize clinical acumen, EKG, Troponin and serial changes to determine if it is an Acute Myocardial Infarction or myocardial injury due to an underlying chronic condition.       Results may be falsely decreased if patient taking Biotin.      CBC Auto Differential [[id number]]  (Abnormal) Collected: 08/11/22 0616    Specimen: Blood Updated: 08/11/22 0627     WBC 11.75 10*3/mm3      RBC 4.01 10*6/mm3      Hemoglobin 13.2 g/dL      Hematocrit 39.4 %      MCV 98.3 fL      MCH 32.9 pg      MCHC 33.5 g/dL      RDW 12.1 %      RDW-SD 44.0 fl      MPV 9.0 fL      Platelets 260 10*3/mm3      Neutrophil % 80.3 %      Lymphocyte % 11.3 %      Monocyte % 6.6 %      Eosinophil % 1.2 %      Basophil % 0.3 %      Immature Grans % 0.3 %      Neutrophils, Absolute 9.42 10*3/mm3      Lymphocytes, Absolute 1.33 10*3/mm3      Monocytes, Absolute 0.78 10*3/mm3      Eosinophils, Absolute 0.14 10*3/mm3      Basophils, Absolute 0.04 10*3/mm3      Immature Grans, Absolute 0.04 10*3/mm3      nRBC 0.0 /100 WBC     Lactic Acid, Plasma [[id number]]  (Normal) Collected: 08/11/22 0741    Specimen: Blood Updated: 08/11/22 0803     Lactate 1.9 mmol/L            Imaging:  CT Abdomen Pelvis With Contrast    Result Date: 8/11/2022  PROCEDURE: CT ABDOMEN PELVIS W CONTRAST  COMPARISON: [facility], CT, ABDOMEN/PELVIS WITH CONTRAST, 5/09/2019, 8:09.  INDICATIONS: GENERALIZED ABDOMEN PAIN WITH CRAMPING  TECHNIQUE: After obtaining the patient's consent, CT images were created with non-ionic  intravenous contrast material.   PROTOCOL:   Standard imaging protocol performed    RADIATION:   DLP: 396.8mGy*cm   Automated exposure control was utilized to minimize radiation dose. CONTRAST: 100cc Isovue 370 I.V.  FINDINGS:  There is a stable 4 mm right lower lobe nodule.  Calcified granulomas seen in the left base.  No suspicious infiltrate.  Heart size appears within normal limits.  No pericardial effusion.  There is a small hiatal hernia.  No definite pneumatosis, pneumoperitoneum, or other ectopic bowel gas is identified.  There is mild distention of the stomach.  There are distended and borderline dilated loops of predominantly fluid-filled small bowel with some air-fluid levels.  The duodenum and proximal jejunum appear nondilated.  The distal ileum also appears nondilated.  Appearance is similar to the exam from 2019. There appears to be transition to nondilated small bowel in the lower central pelvis of unclear etiology.  The appendix appears grossly normal.  There is an average amount of gas and stool in the colon.  There is diverticulosis of the distal colon.  No significant colonic or rectal abnormality is visualized at this time.  There is a trace amount of free fluid in the pelvis.  There is a 5 mm hypodensity in the inferior left hepatic lobe on image 36 of series 201 which appears to have been subtly present on the prior exam, likely a small cyst.  Similar probable small cyst is seen on image 29 in the left hepatic lobe.  No definite suspicious hepatic lesion is seen.  There is mild prominence of the pancreatic duct, as before.  No definite acute biliary or pancreatic abnormality is seen.  Adrenal glands appear unremarkable.  No suspicious splenic abnormality is seen.  There is expected uptake in excretion from the kidneys on delayed phase images.  There are multiple small bilateral renal hypodensities, which are too small to accurately characterize, but the majority of which appear to have been  present previously and did not appear to enhance, favoring small cysts.  The ureters are difficult to visualized in the lower abdomen and pelvis.  There are suspected calcified phleboliths in the pelvic soft tissues.  No significant obstructing urinary tract calculus is visualized.  Bladder appears minimally distended and grossly unremarkable.  There is calcific atherosclerosis of the aorta without definite aneurysm.  The mesenteric vessels appear to enhance as expected on this venous phase exam.  No significant abdominal or pelvic adenopathy is seen.  The uterus is not visualized.  There is posterior spinal fusion hardware at L4-5.  There is advanced disc degeneration in the upper lumbar spine.  There is mild left convex curvature of the lumbar spine.  Osseous structures appear demineralized.  There are suture anchors at the pubic symphysis.  There is mild bilateral hip osteoarthritis.        1. Distended and borderline dilated loops of fluid-filled small bowel with transition to nondilated distal small bowel in the lower central pelvis.  Findings appear similar to the exam from 2019 and are suspicious for recurrent early or partial small bowel obstruction. 2. Small amount of free fluid without significant ectopic bowel gas seen at this time. 3. Multiple small renal hypodensities, too small to accurately assess, but statistically most likely benign. 4. Small hiatal hernia. 5. Diverticulosis.     GALA LOAIZA MD       Electronically Signed and Approved By: GALA LOAIZA MD on 8/11/2022 at 8:39             XR Abdomen KUB    Result Date: 8/11/2022  PROCEDURE: XR ABDOMEN KUB  COMPARISON: [facility], CT, CT ABDOMEN PELVIS W CONTRAST, 8/11/2022, 8:04.  [facility], CR, ABDOMEN FLAT & UPRIGHT, 5/10/2019, 5:25.  INDICATIONS: check NG placement  FINDINGS:   Nasogastric tube tip and side port are in the stomach.  No evidence of pneumoperitoneum.  There are mildly dilated loops of small bowel.  The  lung bases are clear.  Contrast is present in the renal collecting systems.  Pedicle screws and rods are present in the lower lumbar spine.  IMPRESSION: Nasogastric tube in good position.   TAURUS JOHNSTON MD       Electronically Signed and Approved By: TAURUS JOHNSTON MD on 8/11/2022 at 11:59               Procedures:  Procedures    Progress                            Medical Decision Making:  MDM  Number of Diagnoses or Management Options     Amount and/or Complexity of Data Reviewed  Clinical lab tests: reviewed  Tests in the radiology section of CPT®: reviewed  Tests in the medicine section of CPT®: reviewed    Patient Progress  Patient progress: (08:48 EDT Updated patient on results and plan for admission. CT scan showed Bowel Obstruction. Patient expressed understanding and agreement. All questions answered at this time. )       Final diagnoses:   Small bowel obstruction (HCC)        Disposition:  ED Disposition     ED Disposition   Decision to Admit    Condition   --    Comment   Level of Care: Med/Surg [1]   Diagnosis: Small bowel obstruction (HCC) [701701]   Admitting Physician: FRITZ LEE [[id number]]   Attending Physician: FRITZ LEE [[id number]]   Isolate for COVID?: No [0]   Certification: I Certify That Inpatient Hospital Services Are Medically Necessary For Greater Than 2 Midnights               This medical record created using voice recognition software and a virtual scribe.    Documentation assistance provided by Whitney Tafoya acting as scribe for Mark Davis DO. Information recorded by the scribe was done at my direction and has been verified and validated by me.          Whitney Tafoya  08/11/22 0759       Whitney Tafoya  08/11/22 0850       Mark Davis DO  08/11/22 1240

## 2022-08-12 LAB
ALBUMIN SERPL-MCNC: 3.7 G/DL (ref 3.5–5.2)
ALBUMIN/GLOB SERPL: 1.3 G/DL
ALP SERPL-CCNC: 83 U/L (ref 39–117)
ALT SERPL W P-5'-P-CCNC: 8 U/L (ref 1–33)
ANION GAP SERPL CALCULATED.3IONS-SCNC: 7 MMOL/L (ref 5–15)
AST SERPL-CCNC: 14 U/L (ref 1–32)
BACTERIA UR QL AUTO: ABNORMAL /HPF
BILIRUB SERPL-MCNC: 0.6 MG/DL (ref 0–1.2)
BILIRUB UR QL STRIP: NEGATIVE
BUN SERPL-MCNC: 17 MG/DL (ref 8–23)
BUN/CREAT SERPL: 21 (ref 7–25)
CALCIUM SPEC-SCNC: 9.2 MG/DL (ref 8.6–10.5)
CHLORIDE SERPL-SCNC: 105 MMOL/L (ref 98–107)
CLARITY UR: CLEAR
CO2 SERPL-SCNC: 29 MMOL/L (ref 22–29)
COLOR UR: YELLOW
CREAT SERPL-MCNC: 0.81 MG/DL (ref 0.57–1)
DEPRECATED RDW RBC AUTO: 44.7 FL (ref 37–54)
EGFRCR SERPLBLD CKD-EPI 2021: 78.7 ML/MIN/1.73
ERYTHROCYTE [DISTWIDTH] IN BLOOD BY AUTOMATED COUNT: 12.3 % (ref 12.3–15.4)
GLOBULIN UR ELPH-MCNC: 2.9 GM/DL
GLUCOSE SERPL-MCNC: 121 MG/DL (ref 65–99)
GLUCOSE UR STRIP-MCNC: NEGATIVE MG/DL
HCT VFR BLD AUTO: 38.2 % (ref 34–46.6)
HGB BLD-MCNC: 12.8 G/DL (ref 12–15.9)
HGB UR QL STRIP.AUTO: ABNORMAL
HYALINE CASTS UR QL AUTO: ABNORMAL /LPF
KETONES UR QL STRIP: NEGATIVE
LEUKOCYTE ESTERASE UR QL STRIP.AUTO: ABNORMAL
MCH RBC QN AUTO: 33.3 PG (ref 26.6–33)
MCHC RBC AUTO-ENTMCNC: 33.5 G/DL (ref 31.5–35.7)
MCV RBC AUTO: 99.5 FL (ref 79–97)
NITRITE UR QL STRIP: NEGATIVE
PH UR STRIP.AUTO: 5.5 [PH] (ref 5–8)
PLATELET # BLD AUTO: 249 10*3/MM3 (ref 140–450)
PMV BLD AUTO: 9.4 FL (ref 6–12)
POTASSIUM SERPL-SCNC: 3.8 MMOL/L (ref 3.5–5.2)
PROT SERPL-MCNC: 6.6 G/DL (ref 6–8.5)
PROT UR QL STRIP: NEGATIVE
RBC # BLD AUTO: 3.84 10*6/MM3 (ref 3.77–5.28)
RBC # UR STRIP: ABNORMAL /HPF
REF LAB TEST METHOD: ABNORMAL
SODIUM SERPL-SCNC: 141 MMOL/L (ref 136–145)
SP GR UR STRIP: >1.03 (ref 1–1.03)
SQUAMOUS #/AREA URNS HPF: ABNORMAL /HPF
UROBILINOGEN UR QL STRIP: ABNORMAL
WBC # UR STRIP: ABNORMAL /HPF
WBC NRBC COR # BLD: 10.47 10*3/MM3 (ref 3.4–10.8)

## 2022-08-12 PROCEDURE — 25010000002 ENOXAPARIN PER 10 MG: Performed by: INTERNAL MEDICINE

## 2022-08-12 PROCEDURE — 25010000002 METOCLOPRAMIDE PER 10 MG: Performed by: STUDENT IN AN ORGANIZED HEALTH CARE EDUCATION/TRAINING PROGRAM

## 2022-08-12 PROCEDURE — 80053 COMPREHEN METABOLIC PANEL: CPT | Performed by: INTERNAL MEDICINE

## 2022-08-12 PROCEDURE — 25010000002 HYDROMORPHONE 1 MG/ML SOLUTION: Performed by: INTERNAL MEDICINE

## 2022-08-12 PROCEDURE — 36415 COLL VENOUS BLD VENIPUNCTURE: CPT | Performed by: INTERNAL MEDICINE

## 2022-08-12 PROCEDURE — 25010000002 ONDANSETRON PER 1 MG: Performed by: INTERNAL MEDICINE

## 2022-08-12 PROCEDURE — 81001 URINALYSIS AUTO W/SCOPE: CPT | Performed by: EMERGENCY MEDICINE

## 2022-08-12 PROCEDURE — 94799 UNLISTED PULMONARY SVC/PX: CPT

## 2022-08-12 PROCEDURE — 85027 COMPLETE CBC AUTOMATED: CPT | Performed by: INTERNAL MEDICINE

## 2022-08-12 PROCEDURE — 25010000002 ONDANSETRON PER 1 MG: Performed by: STUDENT IN AN ORGANIZED HEALTH CARE EDUCATION/TRAINING PROGRAM

## 2022-08-12 RX ORDER — ONDANSETRON 2 MG/ML
4 INJECTION INTRAMUSCULAR; INTRAVENOUS EVERY 4 HOURS PRN
Status: DISCONTINUED | OUTPATIENT
Start: 2022-08-12 | End: 2022-08-26 | Stop reason: HOSPADM

## 2022-08-12 RX ORDER — METOCLOPRAMIDE HYDROCHLORIDE 5 MG/ML
10 INJECTION INTRAMUSCULAR; INTRAVENOUS EVERY 4 HOURS PRN
Status: DISCONTINUED | OUTPATIENT
Start: 2022-08-12 | End: 2022-08-12

## 2022-08-12 RX ORDER — METOCLOPRAMIDE HYDROCHLORIDE 5 MG/ML
10 INJECTION INTRAMUSCULAR; INTRAVENOUS EVERY 6 HOURS PRN
Status: DISCONTINUED | OUTPATIENT
Start: 2022-08-12 | End: 2022-08-12

## 2022-08-12 RX ORDER — METOCLOPRAMIDE HYDROCHLORIDE 5 MG/ML
10 INJECTION INTRAMUSCULAR; INTRAVENOUS EVERY 6 HOURS
Status: COMPLETED | OUTPATIENT
Start: 2022-08-12 | End: 2022-08-14

## 2022-08-12 RX ADMIN — HYDROMORPHONE HYDROCHLORIDE 0.5 MG: 1 INJECTION, SOLUTION INTRAMUSCULAR; INTRAVENOUS; SUBCUTANEOUS at 17:22

## 2022-08-12 RX ADMIN — SODIUM CHLORIDE, POTASSIUM CHLORIDE, SODIUM LACTATE AND CALCIUM CHLORIDE 100 ML/HR: 600; 310; 30; 20 INJECTION, SOLUTION INTRAVENOUS at 07:10

## 2022-08-12 RX ADMIN — METOCLOPRAMIDE HYDROCHLORIDE 10 MG: 5 INJECTION INTRAMUSCULAR; INTRAVENOUS at 10:49

## 2022-08-12 RX ADMIN — METOCLOPRAMIDE HYDROCHLORIDE 10 MG: 5 INJECTION INTRAMUSCULAR; INTRAVENOUS at 14:10

## 2022-08-12 RX ADMIN — Medication 10 ML: at 08:31

## 2022-08-12 RX ADMIN — METOCLOPRAMIDE HYDROCHLORIDE 10 MG: 5 INJECTION INTRAMUSCULAR; INTRAVENOUS at 20:42

## 2022-08-12 RX ADMIN — SENNOSIDES AND DOCUSATE SODIUM 2 TABLET: 50; 8.6 TABLET ORAL at 08:31

## 2022-08-12 RX ADMIN — ONDANSETRON 4 MG: 2 INJECTION INTRAMUSCULAR; INTRAVENOUS at 03:58

## 2022-08-12 RX ADMIN — ONDANSETRON 4 MG: 2 INJECTION INTRAMUSCULAR; INTRAVENOUS at 17:22

## 2022-08-12 RX ADMIN — FAMOTIDINE 40 MG: 20 TABLET ORAL at 08:31

## 2022-08-12 RX ADMIN — HYDROMORPHONE HYDROCHLORIDE 0.5 MG: 1 INJECTION, SOLUTION INTRAMUSCULAR; INTRAVENOUS; SUBCUTANEOUS at 10:53

## 2022-08-12 RX ADMIN — ENOXAPARIN SODIUM 40 MG: 100 INJECTION SUBCUTANEOUS at 08:32

## 2022-08-12 NOTE — PROGRESS NOTES
[facility]     Progress Note    Patient Name: Fanta Hawkins  : 1950  MRN: [id number]  Primary Care Physician:  Eduard Minaya MD  Date of admission: 2022    Subjective    Patient complaining of nausea and vomiting  Still no bowel movements. No gas  Abdomen is soft      Facility-Administered Medications as of 2022   Medication Dose Route Frequency Provider Last Rate Last Admin   • acetaminophen (TYLENOL) 160 MG/5ML solution 650 mg  650 mg Nasogastric Q4H PRN Vish Vieira MD        Or   • acetaminophen (TYLENOL) tablet 650 mg  650 mg Nasogastric Q4H PRN Vish Vieira MD        Or   • acetaminophen (TYLENOL) suppository 650 mg  650 mg Rectal Q4H PRN Vish Vieira MD       • ALPRAZolam (XANAX) tablet 0.5 mg  0.5 mg Nasogastric Q8H PRN Vish Vieira MD       • aluminum-magnesium hydroxide-simethicone (MAALOX MAX) 400-400-40 MG/5ML suspension 15 mL  15 mL Nasogastric Q6H PRN Vish Vieira MD       • polyethylene glycol (MIRALAX) packet 17 g  17 g Nasogastric Daily PRN Vish Vieira MD        And   • sennosides-docusate (PERICOLACE) 8.6-50 MG per tablet 2 tablet  2 tablet Nasogastric BID Vish Vieira MD   2 tablet at 22    And   • bisacodyl (DULCOLAX) suppository 10 mg  10 mg Rectal Daily PRN Vish Vieira MD       • Enoxaparin Sodium (LOVENOX) syringe 40 mg  40 mg Subcutaneous Q24H Vish Vieira MD   40 mg at 22 1152   • [COMPLETED] famotidine (PEPCID) injection 20 mg  20 mg Intravenous Once Mark Davis DO   20 mg at 22 0736   • famotidine (PEPCID) tablet 40 mg  40 mg Nasogastric Daily Vish Vieira MD   40 mg at 22 1152   • [COMPLETED] fentaNYL citrate (PF) (SUBLIMAZE) injection 50 mcg  50 mcg Intravenous Once Mark Davis,    50 mcg at 22 0736   • HYDROcodone-acetaminophen (NORCO)  MG per tablet 1 tablet  1 tablet Nasogastric Q4H PRN Vish Vieira MD       • HYDROcodone-acetaminophen  (NORCO) 5-325 MG per tablet 1 tablet  1 tablet Nasogastric Q4H PRN Vish Vieira MD       • HYDROmorphone (DILAUDID) injection 0.5 mg  0.5 mg Intravenous Q2H PRN Vish Vieira MD   0.5 mg at 08/11/22 1633    And   • naloxone (NARCAN) injection 0.4 mg  0.4 mg Intravenous Q5 Min PRN Vish Vieira MD       • [COMPLETED] iopamidol (ISOVUE-370) 76 % injection 100 mL  100 mL Intravenous Once in imaging Mark Davis DO   100 mL at 08/11/22 0812   • ipratropium-albuterol (DUO-NEB) nebulizer solution 3 mL  3 mL Nebulization Q4H PRN Vish Vieira MD       • lactated ringers infusion  100 mL/hr Intravenous Continuous Vish Vieira  mL/hr at 08/11/22 1152 100 mL/hr at 08/11/22 1152   • melatonin tablet 5 mg  5 mg Nasogastric Nightly PRN Vish Vieira MD       • [COMPLETED] ondansetron (ZOFRAN) injection 4 mg  4 mg Intravenous Once Africa Donaldson MD   4 mg at 08/11/22 0622   • [COMPLETED] ondansetron (ZOFRAN) injection 4 mg  4 mg Intravenous Once Mark Davis DO   4 mg at 08/11/22 0736   • [COMPLETED] ondansetron (ZOFRAN) injection 4 mg  4 mg Intravenous Once Mark Davis DO   4 mg at 08/11/22 0935   • ondansetron (ZOFRAN) injection 4 mg  4 mg Intravenous Q6H PRN Vish Vieira MD   4 mg at 08/12/22 0358   • [COMPLETED] sodium chloride 0.9 % bolus 1,000 mL  1,000 mL Intravenous Once Mark Davis DO   Stopped at 08/11/22 0806   • sodium chloride 0.9 % flush 10 mL  10 mL Intravenous PRN Mark Davis DO       • sodium chloride 0.9 % flush 10 mL  10 mL Intravenous PRN Vish Vieira MD       • sodium chloride 0.9 % flush 10 mL  10 mL Intravenous Q12H Vish Vieira MD   10 mL at 08/11/22 1932   • sodium chloride 0.9 % infusion 40 mL  40 mL Intravenous PRN Vish Vieira MD              Review of Systems  Constitutional:        Weakness tiredness fatigue  Eyes:                       No blurry vision, eye discharge, eye irritation, eye pain  HEENT:                    No acute hair loss, earache and discharge, nasal congestion or discharge, sore throat, postnasal drip  Respiratory:           No shortness of breath coughing sputum production wheezing hemoptysis pleuritic chest pain  Cardiovascular:     No chest pain, orthopnea, PND, dizziness, palpitation, lower extremity edema  Gastrointestinal:   No nausea vomiting diarrhea abdominal pain constipation  Genitourinary:       No urinary incontinence, hesitancy, frequency, urgency, dysuria  Hematologic:         No bruising, bleeding, pallor, lymphadenopathy  Endocrine:            No coldness, hot flashes, polyuria, abnormal hair growth  Musculoskeletal:    No body pains, aches, arthritic pains, muscle pain ,muscle wasting  Psychiatric:          No low or high mood, anxiety, hallucinations, delusions  Skin.                      No rash, ulcers, bruising, itching  Neurological:        No confusion, headache, focal weakness, numbness, dysphasia    Objective   Objective     Vitals:   Temp:  [97.3 °F (36.3 °C)-99.1 °F (37.3 °C)] 98.6 °F (37 °C)  Heart Rate:  [73-85] 85  Resp:  [18-20] 18  BP: (120-162)/(61-92) 133/74  Flow (L/min):  [2] 2  Physical Exam    Constitutional: Awake, alert responsive, conversant, no obvious distress              Psychiatric:  Appropriate affect, cooperative   Neurologic:  Awake alert ,oriented x 3, strength symmetric in all extremities, Cranial Nerves grossly intact to confrontation, speech clear   Eyes:   PERRLA, sclerae anicteric, no conjunctival injection   HEENT:  Moist mucous membranes, no nasal or eye discharge, no throat congestion   Neck:   Supple, no thyromegaly, no lymphadenopathy, trachea midline, no elevated JVD   Respiratory:  Clear to auscultation bilaterally, nonlabored respirations    Cardiovascular: RRR, no murmurs, rubs, or gallops, palpable pedal pulses bilaterally, No bilateral ankle edema   Gastrointestinal: _ve bowel sounds, soft, nontender, nondistended, no  organomegaly   Musculoskeletal:  No clubbing or cyanosis to extremities,muscle wasting, joint swelling, muscle weakness             Skin:                      No rashes, bruising, skin ulcers, petechiae or ecchymosis    Result Review    Result Review:  I have personally reviewed the results from the time of this admission to 8/12/2022 07:44 EDT and agree with these findings:  [x]  Laboratory  [x]  Microbiology  [x]  Radiology  []  EKG/Telemetry   []  Cardiology/Vascular   []  Pathology  []  Old records  []  Other:    Assessment & Plan   Assessment / Plan       Active Hospital Problems:  Active Hospital Problems    Diagnosis    • Small bowel obstruction (HCC)     71 y.o. female With past medical history significant for COPD, gerd, constipation, osteoarthritis came to the emergency room complaining of nausea vomiting abdominal pain,CT of the abdomen and pelvis showed small bowel obstruction. Has prior hx of abdominal surgery and bowel obstruction. NG placed and decompressed with improvement in abdominal pain    Plan:   Will clamp ng  IV reglan for 48 hrs scheduled 10 mg q6  IV onset q4  Avoid narcotic pain medicines as much as possible  NPO for now  Will reassess daily to remove the ng tube       Electronically signed by Mitchel Vicente MD, 08/12/22, 7:44 AM EDT.

## 2022-08-12 NOTE — SIGNIFICANT NOTE
08/12/22 1045   Coping/Psychosocial   Observed Emotional State calm;quiet;other (see comments)  (asleep)   Family/Support Persons significant other;son   Involvement in Care no interaction with patient   Additional Documentation Spiritual Care (Group)   Spiritual Care   Use of Spiritual Resources non-Orthodox use of spiritual care   Spiritual Care Source  initiative   Spiritual Care Follow-Up follow-up, none required as presently assessed   Response to Spiritual Care engaged in conversation;receptive of support   Spiritual Care Visit Type initial   Receptivity to Spiritual Care visit welcomed     Pt's boyfriend and son, say she is not doing good.

## 2022-08-13 PROCEDURE — 25010000002 ONDANSETRON PER 1 MG: Performed by: STUDENT IN AN ORGANIZED HEALTH CARE EDUCATION/TRAINING PROGRAM

## 2022-08-13 PROCEDURE — 25010000002 METOCLOPRAMIDE PER 10 MG: Performed by: STUDENT IN AN ORGANIZED HEALTH CARE EDUCATION/TRAINING PROGRAM

## 2022-08-13 PROCEDURE — 25010000002 HYDROMORPHONE 1 MG/ML SOLUTION: Performed by: INTERNAL MEDICINE

## 2022-08-13 PROCEDURE — 25010000002 ENOXAPARIN PER 10 MG: Performed by: INTERNAL MEDICINE

## 2022-08-13 RX ORDER — ACETAMINOPHEN 650 MG/1
650 SUPPOSITORY RECTAL EVERY 4 HOURS PRN
Status: DISCONTINUED | OUTPATIENT
Start: 2022-08-13 | End: 2022-08-16

## 2022-08-13 RX ORDER — AMOXICILLIN 250 MG
2 CAPSULE ORAL 2 TIMES DAILY
Status: DISCONTINUED | OUTPATIENT
Start: 2022-08-13 | End: 2022-08-16

## 2022-08-13 RX ORDER — ACETAMINOPHEN 160 MG/5ML
650 SOLUTION ORAL EVERY 4 HOURS PRN
Status: DISCONTINUED | OUTPATIENT
Start: 2022-08-13 | End: 2022-08-16

## 2022-08-13 RX ORDER — ACETAMINOPHEN 325 MG/1
650 TABLET ORAL EVERY 4 HOURS PRN
Status: DISCONTINUED | OUTPATIENT
Start: 2022-08-13 | End: 2022-08-16

## 2022-08-13 RX ORDER — HYDROCODONE BITARTRATE AND ACETAMINOPHEN 10; 325 MG/1; MG/1
1 TABLET ORAL EVERY 4 HOURS PRN
Status: DISCONTINUED | OUTPATIENT
Start: 2022-08-13 | End: 2022-08-16

## 2022-08-13 RX ORDER — FAMOTIDINE 20 MG/1
40 TABLET, FILM COATED ORAL DAILY
Status: DISCONTINUED | OUTPATIENT
Start: 2022-08-14 | End: 2022-08-16

## 2022-08-13 RX ORDER — POTASSIUM CHLORIDE 750 MG/1
40 CAPSULE, EXTENDED RELEASE ORAL ONCE
Status: COMPLETED | OUTPATIENT
Start: 2022-08-13 | End: 2022-08-13

## 2022-08-13 RX ORDER — LACTULOSE 10 G/15ML
30 SOLUTION ORAL ONCE
Status: COMPLETED | OUTPATIENT
Start: 2022-08-13 | End: 2022-08-13

## 2022-08-13 RX ORDER — BISACODYL 5 MG/1
5 TABLET, DELAYED RELEASE ORAL DAILY PRN
Status: DISCONTINUED | OUTPATIENT
Start: 2022-08-13 | End: 2022-08-17

## 2022-08-13 RX ORDER — ALUMINA, MAGNESIA, AND SIMETHICONE 2400; 2400; 240 MG/30ML; MG/30ML; MG/30ML
15 SUSPENSION ORAL EVERY 6 HOURS PRN
Status: DISCONTINUED | OUTPATIENT
Start: 2022-08-13 | End: 2022-08-16

## 2022-08-13 RX ORDER — ALPRAZOLAM 0.25 MG/1
0.5 TABLET ORAL EVERY 8 HOURS PRN
Status: DISCONTINUED | OUTPATIENT
Start: 2022-08-13 | End: 2022-08-16

## 2022-08-13 RX ORDER — POLYETHYLENE GLYCOL 3350 17 G/17G
17 POWDER, FOR SOLUTION ORAL DAILY PRN
Status: DISCONTINUED | OUTPATIENT
Start: 2022-08-13 | End: 2022-08-16

## 2022-08-13 RX ORDER — CHOLECALCIFEROL (VITAMIN D3) 125 MCG
5 CAPSULE ORAL NIGHTLY PRN
Status: DISCONTINUED | OUTPATIENT
Start: 2022-08-13 | End: 2022-08-16

## 2022-08-13 RX ORDER — BISACODYL 10 MG
10 SUPPOSITORY, RECTAL RECTAL DAILY PRN
Status: DISCONTINUED | OUTPATIENT
Start: 2022-08-13 | End: 2022-08-16

## 2022-08-13 RX ORDER — HYDROCODONE BITARTRATE AND ACETAMINOPHEN 5; 325 MG/1; MG/1
1 TABLET ORAL EVERY 4 HOURS PRN
Status: DISCONTINUED | OUTPATIENT
Start: 2022-08-13 | End: 2022-08-16

## 2022-08-13 RX ADMIN — ONDANSETRON 4 MG: 2 INJECTION INTRAMUSCULAR; INTRAVENOUS at 23:14

## 2022-08-13 RX ADMIN — Medication 10 ML: at 09:06

## 2022-08-13 RX ADMIN — FAMOTIDINE 40 MG: 20 TABLET ORAL at 09:06

## 2022-08-13 RX ADMIN — HYDROMORPHONE HYDROCHLORIDE 0.5 MG: 1 INJECTION, SOLUTION INTRAMUSCULAR; INTRAVENOUS; SUBCUTANEOUS at 12:46

## 2022-08-13 RX ADMIN — SENNOSIDES AND DOCUSATE SODIUM 2 TABLET: 50; 8.6 TABLET ORAL at 20:06

## 2022-08-13 RX ADMIN — LACTULOSE 30 G: 20 SOLUTION ORAL at 09:06

## 2022-08-13 RX ADMIN — METOCLOPRAMIDE HYDROCHLORIDE 10 MG: 5 INJECTION INTRAMUSCULAR; INTRAVENOUS at 20:06

## 2022-08-13 RX ADMIN — METOCLOPRAMIDE HYDROCHLORIDE 10 MG: 5 INJECTION INTRAMUSCULAR; INTRAVENOUS at 12:46

## 2022-08-13 RX ADMIN — HYDROMORPHONE HYDROCHLORIDE 0.5 MG: 1 INJECTION, SOLUTION INTRAMUSCULAR; INTRAVENOUS; SUBCUTANEOUS at 20:19

## 2022-08-13 RX ADMIN — HYDROMORPHONE HYDROCHLORIDE 0.5 MG: 1 INJECTION, SOLUTION INTRAMUSCULAR; INTRAVENOUS; SUBCUTANEOUS at 09:57

## 2022-08-13 RX ADMIN — METOCLOPRAMIDE HYDROCHLORIDE 10 MG: 5 INJECTION INTRAMUSCULAR; INTRAVENOUS at 01:03

## 2022-08-13 RX ADMIN — HYDROMORPHONE HYDROCHLORIDE 0.5 MG: 1 INJECTION, SOLUTION INTRAMUSCULAR; INTRAVENOUS; SUBCUTANEOUS at 23:14

## 2022-08-13 RX ADMIN — Medication 10 ML: at 20:06

## 2022-08-13 RX ADMIN — SENNOSIDES AND DOCUSATE SODIUM 2 TABLET: 50; 8.6 TABLET ORAL at 09:06

## 2022-08-13 RX ADMIN — METOCLOPRAMIDE HYDROCHLORIDE 10 MG: 5 INJECTION INTRAMUSCULAR; INTRAVENOUS at 07:35

## 2022-08-13 RX ADMIN — POTASSIUM CHLORIDE 40 MEQ: 750 CAPSULE, EXTENDED RELEASE ORAL at 09:06

## 2022-08-13 RX ADMIN — ENOXAPARIN SODIUM 40 MG: 100 INJECTION SUBCUTANEOUS at 12:46

## 2022-08-13 RX ADMIN — HYDROMORPHONE HYDROCHLORIDE 0.5 MG: 1 INJECTION, SOLUTION INTRAMUSCULAR; INTRAVENOUS; SUBCUTANEOUS at 16:24

## 2022-08-13 RX ADMIN — HYDROCODONE BITARTRATE AND ACETAMINOPHEN 1 TABLET: 10; 325 TABLET ORAL at 18:05

## 2022-08-13 NOTE — PROGRESS NOTES
[facility]     Progress Note    Patient Name: Fanta Hawkins  : 1950  MRN: [id number]  Primary Care Physician:  Eduard Minaya MD  Date of admission: 2022    Subjective Patient had a BM yesterday  She is passing gas  She denies abdominal pain      Review of Systems  Constitutional:        Weakness tiredness fatigue  Eyes:                       No blurry vision, eye discharge, eye irritation, eye pain  HEENT:                   No acute hair loss, earache and discharge, nasal congestion or discharge, sore throat, postnasal drip  Respiratory:           No shortness of breath coughing sputum production wheezing hemoptysis pleuritic chest pain  Cardiovascular:     No chest pain, orthopnea, PND, dizziness, palpitation, lower extremity edema  Gastrointestinal:   No nausea vomiting diarrhea abdominal pain constipation  Genitourinary:       No urinary incontinence, hesitancy, frequency, urgency, dysuria  Hematologic:         No bruising, bleeding, pallor, lymphadenopathy  Endocrine:            No coldness, hot flashes, polyuria, abnormal hair growth  Musculoskeletal:    No body pains, aches, arthritic pains, muscle pain ,muscle wasting  Psychiatric:          No low or high mood, anxiety, hallucinations, delusions  Skin.                      No rash, ulcers, bruising, itching  Neurological:        No confusion, headache, focal weakness, numbness, dysphasia    Objective   Objective     Vitals:   Temp:  [97.3 °F (36.3 °C)-98.8 °F (37.1 °C)] 97.3 °F (36.3 °C)  Heart Rate:  [78-89] 85  Resp:  [16-18] 18  BP: (118-162)/(63-93) 146/73  Physical Exam    Constitutional: Awake, alert responsive, conversant, no obvious distress              Psychiatric:  Appropriate affect, cooperative   Neurologic:  Awake alert ,oriented x 3, strength symmetric in all extremities, Cranial Nerves grossly intact to confrontation, speech clear   Eyes:   PERRLA, sclerae anicteric, no conjunctival injection   HEENT:  Moist mucous  membranes, no nasal or eye discharge, no throat congestion   Neck:   Supple, no thyromegaly, no lymphadenopathy, trachea midline, no elevated JVD   Respiratory:  Clear to auscultation bilaterally, nonlabored respirations    Cardiovascular: RRR, no murmurs, rubs, or gallops, palpable pedal pulses bilaterally, No bilateral ankle edema   Gastrointestinal: Positive bowel sounds, soft, nontender, nondistended, no organomegaly   Musculoskeletal:  No clubbing or cyanosis to extremities,muscle wasting, joint swelling, muscle weakness             Skin:                      No rashes, bruising, skin ulcers, petechiae or ecchymosis    Result Review    Result Review:  I have personally reviewed the results from the time of this admission to 8/13/2022 08:28 EDT and agree with these findings:  []  Laboratory  []  Microbiology  []  Radiology  []  EKG/Telemetry   []  Cardiology/Vascular   []  Pathology  []  Old records  []  Other:    Assessment & Plan   Assessment / Plan       Active Hospital Problems:  Active Hospital Problems    Diagnosis    • Small bowel obstruction (HCC)    • Nicotine use        Plan:   Replace potassium  Remove NG tube  For 1 dose of lactulose  Start clear liquid  Ambulate patient       Electronically signed by Vish Vieira MD, 08/13/22, 8:28 AM EDT.

## 2022-08-14 LAB — QT INTERVAL: 402 MS

## 2022-08-14 PROCEDURE — 25010000002 ONDANSETRON PER 1 MG: Performed by: STUDENT IN AN ORGANIZED HEALTH CARE EDUCATION/TRAINING PROGRAM

## 2022-08-14 PROCEDURE — 25010000002 METOCLOPRAMIDE PER 10 MG: Performed by: STUDENT IN AN ORGANIZED HEALTH CARE EDUCATION/TRAINING PROGRAM

## 2022-08-14 PROCEDURE — 25010000002 ENOXAPARIN PER 10 MG: Performed by: INTERNAL MEDICINE

## 2022-08-14 RX ADMIN — Medication 10 ML: at 08:49

## 2022-08-14 RX ADMIN — METOCLOPRAMIDE HYDROCHLORIDE 10 MG: 5 INJECTION INTRAMUSCULAR; INTRAVENOUS at 08:48

## 2022-08-14 RX ADMIN — METOCLOPRAMIDE HYDROCHLORIDE 10 MG: 5 INJECTION INTRAMUSCULAR; INTRAVENOUS at 01:43

## 2022-08-14 RX ADMIN — ONDANSETRON 4 MG: 2 INJECTION INTRAMUSCULAR; INTRAVENOUS at 15:47

## 2022-08-14 RX ADMIN — FAMOTIDINE 40 MG: 20 TABLET ORAL at 08:49

## 2022-08-14 RX ADMIN — SENNOSIDES AND DOCUSATE SODIUM 2 TABLET: 50; 8.6 TABLET ORAL at 20:07

## 2022-08-14 RX ADMIN — ONDANSETRON 4 MG: 2 INJECTION INTRAMUSCULAR; INTRAVENOUS at 20:10

## 2022-08-14 RX ADMIN — SENNOSIDES AND DOCUSATE SODIUM 2 TABLET: 50; 8.6 TABLET ORAL at 08:49

## 2022-08-14 RX ADMIN — ENOXAPARIN SODIUM 40 MG: 100 INJECTION SUBCUTANEOUS at 12:18

## 2022-08-14 RX ADMIN — Medication 10 ML: at 20:07

## 2022-08-14 RX ADMIN — HYDROCODONE BITARTRATE AND ACETAMINOPHEN 1 TABLET: 10; 325 TABLET ORAL at 21:36

## 2022-08-14 RX ADMIN — ALPRAZOLAM 0.5 MG: 0.25 TABLET ORAL at 21:36

## 2022-08-14 NOTE — PLAN OF CARE
Goal Outcome Evaluation:              Outcome Evaluation: Patient AOx4. Tolerating room air. Up to chair and bsc standby assistance. Nauseated towards end of shift, zofran given per mar.

## 2022-08-14 NOTE — PROGRESS NOTES
[facility]     Progress Note    Patient Name: Fanta Hawkins  : 1950  MRN: [id number]  Primary Care Physician:  Eduard Minaya MD  Date of admission: 2022    Subjective Patient is feeling better but she is still very weak and tired  Her vomiting settled down  Did not have a bowel movement    Review of Systems  Constitutional:        Weakness tiredness fatigue  Eyes:                       No blurry vision, eye discharge, eye irritation, eye pain  HEENT:                   No acute hair loss, earache and discharge, nasal congestion or discharge, sore throat, postnasal drip  Respiratory:           No shortness of breath coughing sputum production wheezing hemoptysis pleuritic chest pain  Cardiovascular:     No chest pain, orthopnea, PND, dizziness, palpitation, lower extremity edema  Gastrointestinal:   No nausea vomiting diarrhea abdominal pain constipation  Genitourinary:       No urinary incontinence, hesitancy, frequency, urgency, dysuria  Hematologic:         No bruising, bleeding, pallor, lymphadenopathy  Endocrine:            No coldness, hot flashes, polyuria, abnormal hair growth  Musculoskeletal:    No body pains, aches, arthritic pains, muscle pain ,muscle wasting  Psychiatric:          No low or high mood, anxiety, hallucinations, delusions  Skin.                      No rash, ulcers, bruising, itching  Neurological:        No confusion, headache, focal weakness, numbness, dysphasia    Objective   Objective     Vitals:   Temp:  [97.3 °F (36.3 °C)-99.5 °F (37.5 °C)] 98.2 °F (36.8 °C)  Heart Rate:  [84-95] 85  Resp:  [18] 18  BP: (111-154)/(63-90) 111/67  Flow (L/min):  [2] 2  Physical Exam    Constitutional: Awake, alert responsive, conversant, no obvious distress              Psychiatric:  Appropriate affect, cooperative   Neurologic:  Awake alert ,oriented x 3, strength symmetric in all extremities, Cranial Nerves grossly intact to confrontation, speech clear   Eyes:   PÉREZ  sclerae anicteric, no conjunctival injection   HEENT:  Moist mucous membranes, no nasal or eye discharge, no throat congestion   Neck:   Supple, no thyromegaly, no lymphadenopathy, trachea midline, no elevated JVD   Respiratory:  Clear to auscultation bilaterally, nonlabored respirations    Cardiovascular: RRR, no murmurs, rubs, or gallops, palpable pedal pulses bilaterally, No bilateral ankle edema   Gastrointestinal: Positive bowel sounds, soft, nontender, nondistended, no organomegaly   Musculoskeletal:  No clubbing or cyanosis to extremities,muscle wasting, joint swelling, muscle weakness             Skin:                      No rashes, bruising, skin ulcers, petechiae or ecchymosis    Result Review    Result Review:  I have personally reviewed the results from the time of this admission to 8/14/2022 09:36 EDT and agree with these findings:  []  Laboratory  []  Microbiology  []  Radiology  []  EKG/Telemetry   []  Cardiology/Vascular   []  Pathology  []  Old records  []  Other:    Assessment & Plan   Assessment / Plan       Active Hospital Problems:  Active Hospital Problems    Diagnosis    • Small bowel obstruction (HCC)    • Nicotine use        Plan:   Labs tomorrow morning  If patient started having BM then can be discharged home tomorrow         Electronically signed by Vish Vieira MD, 08/14/22, 9:36 AM EDT.

## 2022-08-15 ENCOUNTER — APPOINTMENT (OUTPATIENT)
Dept: GENERAL RADIOLOGY | Facility: HOSPITAL | Age: 72
End: 2022-08-15

## 2022-08-15 PROBLEM — J44.9 COPD (CHRONIC OBSTRUCTIVE PULMONARY DISEASE): Status: ACTIVE | Noted: 2022-08-15

## 2022-08-15 LAB
ALBUMIN SERPL-MCNC: 3 G/DL (ref 3.5–5.2)
ALBUMIN/GLOB SERPL: 1 G/DL
ALP SERPL-CCNC: 54 U/L (ref 39–117)
ALT SERPL W P-5'-P-CCNC: 6 U/L (ref 1–33)
ANION GAP SERPL CALCULATED.3IONS-SCNC: 8.4 MMOL/L (ref 5–15)
AST SERPL-CCNC: 10 U/L (ref 1–32)
BILIRUB SERPL-MCNC: 0.4 MG/DL (ref 0–1.2)
BUN SERPL-MCNC: 20 MG/DL (ref 8–23)
BUN/CREAT SERPL: 31.3 (ref 7–25)
CALCIUM SPEC-SCNC: 8.7 MG/DL (ref 8.6–10.5)
CHLORIDE SERPL-SCNC: 99 MMOL/L (ref 98–107)
CO2 SERPL-SCNC: 28.6 MMOL/L (ref 22–29)
CREAT SERPL-MCNC: 0.64 MG/DL (ref 0.57–1)
DEPRECATED RDW RBC AUTO: 42.9 FL (ref 37–54)
EGFRCR SERPLBLD CKD-EPI 2021: 95.8 ML/MIN/1.73
ERYTHROCYTE [DISTWIDTH] IN BLOOD BY AUTOMATED COUNT: 12 % (ref 12.3–15.4)
GLOBULIN UR ELPH-MCNC: 2.9 GM/DL
GLUCOSE SERPL-MCNC: 89 MG/DL (ref 65–99)
HCT VFR BLD AUTO: 32.4 % (ref 34–46.6)
HGB BLD-MCNC: 11.1 G/DL (ref 12–15.9)
MCH RBC QN AUTO: 33.3 PG (ref 26.6–33)
MCHC RBC AUTO-ENTMCNC: 34.3 G/DL (ref 31.5–35.7)
MCV RBC AUTO: 97.3 FL (ref 79–97)
PLATELET # BLD AUTO: 210 10*3/MM3 (ref 140–450)
PMV BLD AUTO: 9 FL (ref 6–12)
POTASSIUM SERPL-SCNC: 3.4 MMOL/L (ref 3.5–5.2)
PROT SERPL-MCNC: 5.9 G/DL (ref 6–8.5)
RBC # BLD AUTO: 3.33 10*6/MM3 (ref 3.77–5.28)
SODIUM SERPL-SCNC: 136 MMOL/L (ref 136–145)
WBC NRBC COR # BLD: 4.09 10*3/MM3 (ref 3.4–10.8)

## 2022-08-15 PROCEDURE — 85027 COMPLETE CBC AUTOMATED: CPT | Performed by: INTERNAL MEDICINE

## 2022-08-15 PROCEDURE — 25010000002 HYDROMORPHONE 1 MG/ML SOLUTION: Performed by: INTERNAL MEDICINE

## 2022-08-15 PROCEDURE — 25010000002 ONDANSETRON PER 1 MG: Performed by: STUDENT IN AN ORGANIZED HEALTH CARE EDUCATION/TRAINING PROGRAM

## 2022-08-15 PROCEDURE — 25010000002 ENOXAPARIN PER 10 MG: Performed by: INTERNAL MEDICINE

## 2022-08-15 PROCEDURE — 74018 RADEX ABDOMEN 1 VIEW: CPT

## 2022-08-15 PROCEDURE — 80053 COMPREHEN METABOLIC PANEL: CPT | Performed by: INTERNAL MEDICINE

## 2022-08-15 PROCEDURE — 99222 1ST HOSP IP/OBS MODERATE 55: CPT | Performed by: SURGERY

## 2022-08-15 RX ORDER — MAGNESIUM CARB/ALUMINUM HYDROX 105-160MG
30 TABLET,CHEWABLE ORAL DAILY
Status: DISCONTINUED | OUTPATIENT
Start: 2022-08-15 | End: 2022-08-16

## 2022-08-15 RX ORDER — SODIUM CHLORIDE, SODIUM LACTATE, POTASSIUM CHLORIDE, CALCIUM CHLORIDE 600; 310; 30; 20 MG/100ML; MG/100ML; MG/100ML; MG/100ML
75 INJECTION, SOLUTION INTRAVENOUS CONTINUOUS
Status: DISCONTINUED | OUTPATIENT
Start: 2022-08-15 | End: 2022-08-16

## 2022-08-15 RX ADMIN — Medication 10 ML: at 08:29

## 2022-08-15 RX ADMIN — SODIUM CHLORIDE, POTASSIUM CHLORIDE, SODIUM LACTATE AND CALCIUM CHLORIDE 75 ML/HR: 600; 310; 30; 20 INJECTION, SOLUTION INTRAVENOUS at 21:07

## 2022-08-15 RX ADMIN — HYDROMORPHONE HYDROCHLORIDE 0.5 MG: 1 INJECTION, SOLUTION INTRAMUSCULAR; INTRAVENOUS; SUBCUTANEOUS at 16:58

## 2022-08-15 RX ADMIN — ONDANSETRON 4 MG: 2 INJECTION INTRAMUSCULAR; INTRAVENOUS at 16:57

## 2022-08-15 RX ADMIN — FAMOTIDINE 40 MG: 20 TABLET ORAL at 08:29

## 2022-08-15 RX ADMIN — HYDROCODONE BITARTRATE AND ACETAMINOPHEN 1 TABLET: 5; 325 TABLET ORAL at 11:08

## 2022-08-15 RX ADMIN — ENOXAPARIN SODIUM 40 MG: 100 INJECTION SUBCUTANEOUS at 12:26

## 2022-08-15 RX ADMIN — BENZOCAINE 1 SPRAY: 200 SPRAY DENTAL; ORAL; PERIODONTAL at 14:24

## 2022-08-15 RX ADMIN — ONDANSETRON 4 MG: 2 INJECTION INTRAMUSCULAR; INTRAVENOUS at 21:03

## 2022-08-15 RX ADMIN — SENNOSIDES AND DOCUSATE SODIUM 2 TABLET: 50; 8.6 TABLET ORAL at 08:29

## 2022-08-15 RX ADMIN — POLYETHYLENE GLYCOL 3350 17 G: 17 POWDER, FOR SOLUTION ORAL at 08:29

## 2022-08-15 RX ADMIN — MINERAL OIL 30 ML: 1000 SOLUTION ORAL at 14:24

## 2022-08-15 RX ADMIN — SODIUM CHLORIDE, POTASSIUM CHLORIDE, SODIUM LACTATE AND CALCIUM CHLORIDE 75 ML/HR: 600; 310; 30; 20 INJECTION, SOLUTION INTRAVENOUS at 10:02

## 2022-08-15 RX ADMIN — HYDROMORPHONE HYDROCHLORIDE 0.5 MG: 1 INJECTION, SOLUTION INTRAMUSCULAR; INTRAVENOUS; SUBCUTANEOUS at 21:03

## 2022-08-15 RX ADMIN — ONDANSETRON 4 MG: 2 INJECTION INTRAMUSCULAR; INTRAVENOUS at 11:08

## 2022-08-15 NOTE — PROGRESS NOTES
[facility]     Progress Note    Patient Name: Fanta Hawkins  : 1950  MRN: [id number]  Primary Care Physician:  Eduard Minaya MD  Date of admission: 2022    Subjective   Patient has not passed any bowel movement  According to nurse she is not having nausea vomiting  She has been passing gas and was instructed to ambulate  Review of Systems  Constitutional:        Weakness tiredness fatigue  Eyes:                       No blurry vision, eye discharge, eye irritation, eye pain  HEENT:                   No acute hair loss, earache and discharge, nasal congestion or discharge, sore throat, postnasal drip  Respiratory:           No shortness of breath coughing sputum production wheezing hemoptysis pleuritic chest pain  Cardiovascular:     No chest pain, orthopnea, PND, dizziness, palpitation, lower extremity edema  Gastrointestinal:   No nausea vomiting diarrhea abdominal pain constipation  Genitourinary:       No urinary incontinence, hesitancy, frequency, urgency, dysuria  Hematologic:         No bruising, bleeding, pallor, lymphadenopathy  Endocrine:            No coldness, hot flashes, polyuria, abnormal hair growth  Musculoskeletal:    No body pains, aches, arthritic pains, muscle pain ,muscle wasting  Psychiatric:          No low or high mood, anxiety, hallucinations, delusions  Skin.                      No rash, ulcers, bruising, itching  Neurological:        No confusion, headache, focal weakness, numbness, dysphasia    Objective   Objective     Vitals:   Temp:  [97.2 °F (36.2 °C)-98.6 °F (37 °C)] 97.2 °F (36.2 °C)  Heart Rate:  [75-90] 82  Resp:  [20] 20  BP: (118-155)/(62-81) 144/75  Physical Exam    Constitutional: Awake, alert responsive, conversant, no obvious distress              Psychiatric:  Appropriate affect, cooperative   Neurologic:  Awake alert ,oriented x 3, strength symmetric in all extremities, Cranial Nerves grossly intact to confrontation, speech  clear   Eyes:   PERRLA, sclerae anicteric, no conjunctival injection   HEENT:  Moist mucous membranes, no nasal or eye discharge, no throat congestion   Neck:   Supple, no thyromegaly, no lymphadenopathy, trachea midline, no elevated JVD   Respiratory:  Clear to auscultation bilaterally, nonlabored respirations    Cardiovascular: RRR, no murmurs, rubs, or gallops, palpable pedal pulses bilaterally, No bilateral ankle edema   Gastrointestinal: Positive bowel sounds, soft, nontender, nondistended, no organomegaly   Musculoskeletal:  No clubbing or cyanosis to extremities,muscle wasting, joint swelling, muscle weakness             Skin:                      No rashes, bruising, skin ulcers, petechiae or ecchymosis    Result Review    Result Review:  I have personally reviewed the results from the time of this admission to 8/15/2022 09:24 EDT and agree with these findings:  []  Laboratory  []  Microbiology  []  Radiology  []  EKG/Telemetry   []  Cardiology/Vascular   []  Pathology  []  Old records  []  Other:    Assessment & Plan   Assessment / Plan       Active Hospital Problems:  Active Hospital Problems    Diagnosis    • Small bowel obstruction (HCC)    • Nicotine use        Plan:   KUB today  If small bowel obstruction findings persist then may consult surgery       Electronically signed by Vish Vieira MD, 08/15/22, 9:24 AM EDT.

## 2022-08-15 NOTE — CONSULTS
History and Physical    Patient Name:  Fanta Hawkins  YOB: 1950  [id number]    Referring Provider: Dariel RAUSCH      Patient Care Team:  Eduard Minaya MD as PCP - General (Internal Medicine)    Chief complaint: abdominal pain and vomiting    Subjective .     History of present illness:  Ms. Hawkins is a 71 year old female who presented to the ED at [facility] on 8/11/2022 for abdominal pain, nausea, and vomiting that started that day.  She has a past surgical history of a hysterectomy and an exploratory laparotomy to repair a colon perforation after a colonoscopy.  On her day of admission she had a CT scan of the abdomen and pelvis that indicates she had a distended and borderline dilated loops of fluid filled small bowel with transition to nondilated small bowel in the lower central pelvis concerning for a SBO.  The patient had a NG tube placed when she was admitted.  She had an abdominal xray that indicates her bowel is more dilated that it was on her CT scan on admission.  She has had her NG tube removed, said it was causing her to vomit.Says she is tolerating clear liquids if she takes a few sips and then walks around her room.  She denies any pain.       History:  Past Medical History:   Diagnosis Date   • Arthritis    • Asthma    • COPD (chronic obstructive pulmonary disease) (Former[facility]) 8/15/2022   • Injury of back    • Mood disorder ([facility])        Past Surgical History:   Procedure Laterality Date   • BACK SURGERY     • COLONOSCOPY     • EXPLORATORY LAPAROTOMY N/A 03/19/2012    repair of colon perforation after colonoscopy   • EYE SURGERY     • HYSTERECTOMY         History reviewed. No pertinent family history.    Social History     Tobacco Use   • Smoking status: Current Every Day Smoker     Packs/day: 0.25     Types: Cigarettes   • Smokeless tobacco: Never Used   Vaping Use   • Vaping Use: Never used   Substance Use Topics   • Alcohol use: Never   • Drug use: Never       Review of Systems:  A  complete ROS was performed and all are negative except for what is documented in the HPI.    MEDS:  Prior to Admission medications    Medication Sig Start Date End Date Taking? Authorizing Provider   diclofenac (VOLTAREN) 50 MG EC tablet Take 50 mg by mouth Daily. 10/8/21  Yes Senia Garcia MD   HYDROcodone-acetaminophen (NORCO) 5-325 MG per tablet Take 1 tablet by mouth Every 12 (Twelve) Hours As Needed. 10/14/21  Yes Senia Garcia MD   albuterol sulfate  (90 Base) MCG/ACT inhaler Inhale 2 puffs Every 4 (Four) Hours As Needed.    Senia Garcia MD   docusate sodium 100 MG capsule Take 100 mg by mouth 2 (Two) Times a Day As Needed.    Senia Garcia MD   fluticasone (FLONASE) 50 MCG/ACT nasal spray 1 spray by Each Nare route Daily As Needed.    Senia Garcia MD   omeprazole (prilOSEC) 10 MG capsule Take 10 mg by mouth Daily As Needed. 10/8/21   Senia Garcia MD        Benzocaine, 1 spray, Mouth/Throat, Once  enoxaparin, 40 mg, Subcutaneous, Q24H  famotidine, 40 mg, Oral, Daily  mineral oil, 30 mL, Oral, Daily  senna-docusate sodium, 2 tablet, Oral, BID  sodium chloride, 10 mL, Intravenous, Q12H         lactated ringers, 75 mL/hr, Last Rate: 75 mL/hr (08/15/22 1002)         Allergies:  Indomethacin, Meloxicam, Avelox [moxifloxacin], and Morphine    Objective     Vital Signs   Temp:  [97.2 °F (36.2 °C)-98.2 °F (36.8 °C)] 98.1 °F (36.7 °C)  Heart Rate:  [75-87] 86  Resp:  [20] 20  BP: (118-162)/(68-88) 162/88    Physical Exam:     General Appearance:    Alert, cooperative, in no acute distress   Head:    Normocephalic, without obvious abnormality, atraumatic   Ears:    Ears appear intact with no abnormalities noted   Throat:   No oral lesions, no thrush, oral mucosa moist   Neck:   Supple, trachea midline   Lungs:     Clear to auscultation,respirations regular    Heart:    Regular rhythm and normal rate   Chest Wall:    No abnormalities observed   Abdomen:     Normal  bowel sounds, no masses, no organomegaly, soft     non-tender, distended, no guarding   Extremities:   Moves all extremities well, no edema, no cyanosis, no            redness   Pulses:   Pulses palpable and equal bilaterally   Skin:   No bleeding, bruising or rash   Neurologic:   A/o x 3 with no deficits       Results Review: I have reviewed the patient's labs and imaging including CBC, CMP, serial abdominal xrays, and CT scan of abdomen on admission.     LABS/IMAGING:    Imaging Results (Last 72 Hours)     Procedure Component Value Units Date/Time    XR Abdomen KUB [[id number]] Collected: 08/15/22 1007     Updated: 08/15/22 1010    Narrative:      PROCEDURE: XR ABDOMEN KUB     COMPARISON: [facility], CR, XR ABDOMEN KUB, 8/11/2022, 11:12.  [facility], CR, XR ABDOMEN KUB, 8/11/2022, 17:55.  [facility], CT, CT ABDOMEN PELVIS W   CONTRAST, 8/11/2022, 8:04.  [facility], CR, XR ABDOMEN KUB, 8/11/2022, 18:44.     INDICATIONS: small bowel obstruction     FINDINGS:   Single supine view of the abdomen performed.  Postoperative changes lumbar spine.  There are   postoperative changes project over the pelvis and lower lumbar spine.  Multiple pelvic   calcifications reflect phleboliths.  Paucity of large bowel gas.  Persistently dilated gas-filled   small bowel loops measure up to 3.6 centimeters.  This may be greater than previous CT scan from   8/11/2022.  The upper abdomen was not imaged.        Impression:         1. Dilated gas-filled small bowel loops with relative paucity of large bowel gas most concerning   for small bowel obstruction.  Small bowel measures up to 3.6 centimeters may be slightly greater   than prior CT scan 8/11/2022.  2. The upper abdomen and NG tube were not imaged.            LEEANN GRIFFIN MD         Electronically Signed and Approved By: LEEANN GRIFFIN MD on 8/15/2022 at 10:07                            Lab Results (last 72 hours)      Procedure Component Value Units Date/Time    Comprehensive Metabolic Panel [[id number]]  (Abnormal) Collected: 08/15/22 0624    Specimen: Blood Updated: 08/15/22 0720     Glucose 89 mg/dL      BUN 20 mg/dL      Creatinine 0.64 mg/dL      Sodium 136 mmol/L      Potassium 3.4 mmol/L      Chloride 99 mmol/L      CO2 28.6 mmol/L      Calcium 8.7 mg/dL      Total Protein 5.9 g/dL      Albumin 3.00 g/dL      ALT (SGPT) 6 U/L      AST (SGOT) 10 U/L      Alkaline Phosphatase 54 U/L      Total Bilirubin 0.4 mg/dL      Globulin 2.9 gm/dL      A/G Ratio 1.0 g/dL      BUN/Creatinine Ratio 31.3     Anion Gap 8.4 mmol/L      eGFR 95.8 mL/min/1.73      Comment: National Kidney Foundation and American Society of Nephrology (ASN) Task Force recommended calculation based on the Chronic Kidney Disease Epidemiology Collaboration (CKD-EPI) equation refit without adjustment for race.       Narrative:      GFR Normal >60  Chronic Kidney Disease <60  Kidney Failure <15      CBC (No Diff) [[id number]]  (Abnormal) Collected: 08/15/22 0624    Specimen: Blood Updated: 08/15/22 0659     WBC 4.09 10*3/mm3      RBC 3.33 10*6/mm3      Hemoglobin 11.1 g/dL      Hematocrit 32.4 %      MCV 97.3 fL      MCH 33.3 pg      MCHC 34.3 g/dL      RDW 12.0 %      RDW-SD 42.9 fl      MPV 9.0 fL      Platelets 210 10*3/mm3              Result Review :     Assessment & Plan         Nicotine use    Small bowel obstruction (HCC)    COPD (chronic obstructive pulmonary disease) (HCC)   Insert NG tube to low wall intermittent suction   NPO except Ice chips    Chloraseptic at bedside PRN sore throat   Cont IV fluids   Flat and upright of the abdomen in AM   Ambulate in hallway QID with assistance   Repeat CBC and BMP in AM   Mineral oil 30 cc via NG tube daily      JIN Schmidt  08/15/22 14:22 EDT      Electronically signed by JIN Schmidt, 08/15/22, 2:09 PM EDT.

## 2022-08-16 ENCOUNTER — APPOINTMENT (OUTPATIENT)
Dept: GENERAL RADIOLOGY | Facility: HOSPITAL | Age: 72
End: 2022-08-16

## 2022-08-16 LAB
ANION GAP SERPL CALCULATED.3IONS-SCNC: 15.2 MMOL/L (ref 5–15)
BASOPHILS # BLD AUTO: 0.02 10*3/MM3 (ref 0–0.2)
BASOPHILS NFR BLD AUTO: 0.3 % (ref 0–1.5)
BUN SERPL-MCNC: 18 MG/DL (ref 8–23)
BUN/CREAT SERPL: 26.9 (ref 7–25)
CALCIUM SPEC-SCNC: 8.5 MG/DL (ref 8.6–10.5)
CHLORIDE SERPL-SCNC: 97 MMOL/L (ref 98–107)
CO2 SERPL-SCNC: 23.8 MMOL/L (ref 22–29)
CREAT SERPL-MCNC: 0.67 MG/DL (ref 0.57–1)
DEPRECATED RDW RBC AUTO: 42.3 FL (ref 37–54)
EGFRCR SERPLBLD CKD-EPI 2021: 94.7 ML/MIN/1.73
EOSINOPHIL # BLD AUTO: 0.06 10*3/MM3 (ref 0–0.4)
EOSINOPHIL NFR BLD AUTO: 0.9 % (ref 0.3–6.2)
ERYTHROCYTE [DISTWIDTH] IN BLOOD BY AUTOMATED COUNT: 11.9 % (ref 12.3–15.4)
GLUCOSE SERPL-MCNC: 71 MG/DL (ref 65–99)
HCT VFR BLD AUTO: 36.3 % (ref 34–46.6)
HGB BLD-MCNC: 12.4 G/DL (ref 12–15.9)
IMM GRANULOCYTES # BLD AUTO: 0.02 10*3/MM3 (ref 0–0.05)
IMM GRANULOCYTES NFR BLD AUTO: 0.3 % (ref 0–0.5)
LYMPHOCYTES # BLD AUTO: 0.77 10*3/MM3 (ref 0.7–3.1)
LYMPHOCYTES NFR BLD AUTO: 11.1 % (ref 19.6–45.3)
MCH RBC QN AUTO: 33.3 PG (ref 26.6–33)
MCHC RBC AUTO-ENTMCNC: 34.2 G/DL (ref 31.5–35.7)
MCV RBC AUTO: 97.6 FL (ref 79–97)
MONOCYTES # BLD AUTO: 0.88 10*3/MM3 (ref 0.1–0.9)
MONOCYTES NFR BLD AUTO: 12.6 % (ref 5–12)
NEUTROPHILS NFR BLD AUTO: 5.21 10*3/MM3 (ref 1.7–7)
NEUTROPHILS NFR BLD AUTO: 74.8 % (ref 42.7–76)
NRBC BLD AUTO-RTO: 0 /100 WBC (ref 0–0.2)
PLATELET # BLD AUTO: 258 10*3/MM3 (ref 140–450)
PMV BLD AUTO: 9.2 FL (ref 6–12)
POTASSIUM SERPL-SCNC: 3.3 MMOL/L (ref 3.5–5.2)
RBC # BLD AUTO: 3.72 10*6/MM3 (ref 3.77–5.28)
SODIUM SERPL-SCNC: 136 MMOL/L (ref 136–145)
WBC NRBC COR # BLD: 6.96 10*3/MM3 (ref 3.4–10.8)

## 2022-08-16 PROCEDURE — 97161 PT EVAL LOW COMPLEX 20 MIN: CPT

## 2022-08-16 PROCEDURE — 25010000002 ONDANSETRON PER 1 MG: Performed by: STUDENT IN AN ORGANIZED HEALTH CARE EDUCATION/TRAINING PROGRAM

## 2022-08-16 PROCEDURE — 0 POTASSIUM CHLORIDE PER 2 MEQ: Performed by: INTERNAL MEDICINE

## 2022-08-16 PROCEDURE — 99232 SBSQ HOSP IP/OBS MODERATE 35: CPT | Performed by: NURSE PRACTITIONER

## 2022-08-16 PROCEDURE — 85025 COMPLETE CBC W/AUTO DIFF WBC: CPT | Performed by: NURSE PRACTITIONER

## 2022-08-16 PROCEDURE — 80048 BASIC METABOLIC PNL TOTAL CA: CPT | Performed by: NURSE PRACTITIONER

## 2022-08-16 PROCEDURE — 74019 RADEX ABDOMEN 2 VIEWS: CPT

## 2022-08-16 PROCEDURE — 25010000002 HYDROMORPHONE 1 MG/ML SOLUTION: Performed by: INTERNAL MEDICINE

## 2022-08-16 PROCEDURE — 25010000002 ENOXAPARIN PER 10 MG: Performed by: INTERNAL MEDICINE

## 2022-08-16 RX ORDER — BISACODYL 10 MG
10 SUPPOSITORY, RECTAL RECTAL DAILY PRN
Status: DISCONTINUED | OUTPATIENT
Start: 2022-08-16 | End: 2022-08-18

## 2022-08-16 RX ORDER — ACETAMINOPHEN 325 MG/1
650 TABLET ORAL EVERY 4 HOURS PRN
Status: DISCONTINUED | OUTPATIENT
Start: 2022-08-16 | End: 2022-08-23

## 2022-08-16 RX ORDER — FAMOTIDINE 10 MG/ML
20 INJECTION, SOLUTION INTRAVENOUS EVERY 12 HOURS SCHEDULED
Status: DISCONTINUED | OUTPATIENT
Start: 2022-08-16 | End: 2022-08-26 | Stop reason: HOSPADM

## 2022-08-16 RX ORDER — ACETAMINOPHEN 160 MG/5ML
650 SOLUTION ORAL EVERY 4 HOURS PRN
Status: DISCONTINUED | OUTPATIENT
Start: 2022-08-16 | End: 2022-08-23

## 2022-08-16 RX ORDER — MAGNESIUM CARB/ALUMINUM HYDROX 105-160MG
45 TABLET,CHEWABLE ORAL DAILY
Status: DISCONTINUED | OUTPATIENT
Start: 2022-08-16 | End: 2022-08-19

## 2022-08-16 RX ORDER — POTASSIUM CHLORIDE 29.8 MG/ML
20 INJECTION INTRAVENOUS
Status: DISPENSED | OUTPATIENT
Start: 2022-08-16 | End: 2022-08-16

## 2022-08-16 RX ORDER — ALUMINA, MAGNESIA, AND SIMETHICONE 2400; 2400; 240 MG/30ML; MG/30ML; MG/30ML
15 SUSPENSION ORAL EVERY 6 HOURS PRN
Status: DISCONTINUED | OUTPATIENT
Start: 2022-08-16 | End: 2022-08-23

## 2022-08-16 RX ORDER — ENOXAPARIN SODIUM 100 MG/ML
30 INJECTION SUBCUTANEOUS EVERY 24 HOURS
Status: DISCONTINUED | OUTPATIENT
Start: 2022-08-17 | End: 2022-08-26 | Stop reason: HOSPADM

## 2022-08-16 RX ORDER — HYDROCODONE BITARTRATE AND ACETAMINOPHEN 10; 325 MG/1; MG/1
1 TABLET ORAL EVERY 4 HOURS PRN
Status: ACTIVE | OUTPATIENT
Start: 2022-08-16 | End: 2022-08-18

## 2022-08-16 RX ORDER — CHOLECALCIFEROL (VITAMIN D3) 125 MCG
5 CAPSULE ORAL NIGHTLY PRN
Status: DISCONTINUED | OUTPATIENT
Start: 2022-08-16 | End: 2022-08-23

## 2022-08-16 RX ORDER — AMOXICILLIN 250 MG
2 CAPSULE ORAL 2 TIMES DAILY
Status: DISCONTINUED | OUTPATIENT
Start: 2022-08-16 | End: 2022-08-18

## 2022-08-16 RX ORDER — POLYETHYLENE GLYCOL 3350 17 G/17G
17 POWDER, FOR SOLUTION ORAL DAILY PRN
Status: DISCONTINUED | OUTPATIENT
Start: 2022-08-16 | End: 2022-08-18

## 2022-08-16 RX ORDER — ACETAMINOPHEN 650 MG/1
650 SUPPOSITORY RECTAL EVERY 4 HOURS PRN
Status: DISCONTINUED | OUTPATIENT
Start: 2022-08-16 | End: 2022-08-23

## 2022-08-16 RX ORDER — HYDROCODONE BITARTRATE AND ACETAMINOPHEN 5; 325 MG/1; MG/1
1 TABLET ORAL EVERY 4 HOURS PRN
Status: ACTIVE | OUTPATIENT
Start: 2022-08-16 | End: 2022-08-18

## 2022-08-16 RX ORDER — ALPRAZOLAM 0.25 MG/1
0.5 TABLET ORAL EVERY 8 HOURS PRN
Status: ACTIVE | OUTPATIENT
Start: 2022-08-16 | End: 2022-08-18

## 2022-08-16 RX ORDER — DEXTROSE, SODIUM CHLORIDE, AND POTASSIUM CHLORIDE 5; .9; .15 G/100ML; G/100ML; G/100ML
50 INJECTION INTRAVENOUS CONTINUOUS
Status: DISCONTINUED | OUTPATIENT
Start: 2022-08-16 | End: 2022-08-24

## 2022-08-16 RX ADMIN — ONDANSETRON 4 MG: 2 INJECTION INTRAMUSCULAR; INTRAVENOUS at 07:31

## 2022-08-16 RX ADMIN — FAMOTIDINE 20 MG: 10 INJECTION INTRAVENOUS at 09:45

## 2022-08-16 RX ADMIN — FAMOTIDINE 20 MG: 10 INJECTION INTRAVENOUS at 21:25

## 2022-08-16 RX ADMIN — Medication 10 ML: at 07:32

## 2022-08-16 RX ADMIN — ONDANSETRON 4 MG: 2 INJECTION INTRAMUSCULAR; INTRAVENOUS at 19:15

## 2022-08-16 RX ADMIN — ONDANSETRON 4 MG: 2 INJECTION INTRAMUSCULAR; INTRAVENOUS at 23:27

## 2022-08-16 RX ADMIN — SENNOSIDES AND DOCUSATE SODIUM 2 TABLET: 50; 8.6 TABLET ORAL at 21:24

## 2022-08-16 RX ADMIN — ONDANSETRON 4 MG: 2 INJECTION INTRAMUSCULAR; INTRAVENOUS at 11:55

## 2022-08-16 RX ADMIN — Medication 10 ML: at 21:26

## 2022-08-16 RX ADMIN — POTASSIUM CHLORIDE 20 MEQ: 29.8 INJECTION, SOLUTION INTRAVENOUS at 11:45

## 2022-08-16 RX ADMIN — HYDROMORPHONE HYDROCHLORIDE 0.5 MG: 1 INJECTION, SOLUTION INTRAMUSCULAR; INTRAVENOUS; SUBCUTANEOUS at 07:32

## 2022-08-16 RX ADMIN — DEXTROSE MONOHYDRATE, SODIUM CHLORIDE, AND POTASSIUM CHLORIDE 100 ML/HR: 50; 9; 1.49 INJECTION, SOLUTION INTRAVENOUS at 19:15

## 2022-08-16 RX ADMIN — DEXTROSE MONOHYDRATE, SODIUM CHLORIDE, AND POTASSIUM CHLORIDE 100 ML/HR: 50; 9; 1.49 INJECTION, SOLUTION INTRAVENOUS at 09:45

## 2022-08-16 RX ADMIN — ENOXAPARIN SODIUM 40 MG: 100 INJECTION SUBCUTANEOUS at 11:45

## 2022-08-16 RX ADMIN — PHENOL 1 SPRAY: 1.5 LIQUID ORAL at 19:17

## 2022-08-16 RX ADMIN — HYDROMORPHONE HYDROCHLORIDE 0.5 MG: 1 INJECTION, SOLUTION INTRAMUSCULAR; INTRAVENOUS; SUBCUTANEOUS at 19:15

## 2022-08-16 RX ADMIN — SENNOSIDES AND DOCUSATE SODIUM 2 TABLET: 50; 8.6 TABLET ORAL at 09:45

## 2022-08-16 RX ADMIN — PHENOL 1 SPRAY: 1.5 LIQUID ORAL at 07:32

## 2022-08-16 RX ADMIN — MINERAL OIL 45 ML: 1000 SOLUTION ORAL at 09:46

## 2022-08-16 NOTE — PROGRESS NOTES
[facility]     Progress Note    Patient Name: Fanta Hawkins  : 1950  MRN: [id number]  Primary Care Physician:  Eduard Minaya MD  Date of admission: 2022  6:15 AM       Subjective     Patient continues to have abdominal pain and is having ongoing nausea.  NG tube to low wall suction.  Abdominal KUB continues to show persistent bowel obstruction.    Review of Systems:    Review of Systems   Constitutional: Positive for activity change, appetite change and fatigue.   Gastrointestinal: Positive for abdominal distention, abdominal pain and nausea.   Neurological: Positive for weakness.   All other systems reviewed and are negative.          Objective   Objective     Vitals:   Vitals:    08/15/22 1930 08/15/22 2340 22 0420 22 0714   BP: 143/82 139/75 157/82 154/80   BP Location: Right arm Right arm Right arm Left arm   Patient Position: Lying Lying Lying Lying   Pulse: 84 90 90 81   Resp:  20 22   Temp: 98.2 °F (36.8 °C) 98.1 °F (36.7 °C) 98.8 °F (37.1 °C) 97.7 °F (36.5 °C)   TempSrc: Oral Oral Oral Oral   SpO2: 90% 91% 93% 96%   Weight:   47.5 kg (104 lb 11.5 oz)    Height:              Physical Exam:  Vitals and nursing note reviewed.     Constitutional:      Alert, cooperative, responsive, and conversant.  No acute distress.     HENT:      Normocephalic and atraumatic, no nasal congestion, discharge, mucous membranes are moist, no OP erythema  Eyes:      PERRLA, no scleral icterus, no conjunctival injection, no eye discharge  Neck:     Supple, no thyromegaly, no lymphadenopathy, trachea midline, no elevated JVD  Cardiovascular:      RRR, no murmurs, rubs or gallops. Palpable pedal pulses bilaterally. No BLE edema  Pulmonary:      CTAB. Pulmonary effort is normal and unlabored. No respiratory distress.    Abdominal:      Abdomen with generalized tenderness and distention.   BS consistent with bowel obstruction.   Musculoskeletal:         No muscle wasting, tenderness or  joint swelling.  Generalized weakness.  Skin:     Warm and dry, cap refill less than 3 seconds. No clubbing, cyanosis, jaundice, erythema, rashes or ecchymosis.   Neurological:      AAOx3, speech clear, no focal deficit, strength equal bilaterally in all extremities, cranial nerves grossly intact  Psychiatric:         Mood and affect normal.  Behavior normal.         Result Review    Result Review:  I have personally reviewed the results from the time of this admission to 9/20/2021 18:13 EDT and agree with these findings:  []  Laboratory  []  Microbiology  []  Radiology  []  EKG/Telemetry   []  Cardiology/Vascular   []  Pathology  []  Old records  []  Other:     Assessment/Plan   Assessment / Plan       Active Hospital Problems:  Active Hospital Problems    Diagnosis    • COPD (chronic obstructive pulmonary disease) (HCC)    • Small bowel obstruction (HCC)    • Nicotine use          Plan:   Change IVFs to D5 NS with KCL  Spoke with surgery and appreciate assistance.  Plan per surgery   Continue NGT to LWS   Replace potassium  I have personally reviewed all the information and agree with the plan  Electronically signed by JIN Eduardo, 08/16/22, 7:33 AM EDT.

## 2022-08-16 NOTE — THERAPY EVALUATION
Acute Care - Physical Therapy Initial Evaluation and Discharge Note  SHERRY Veloz     Patient Name: Fanta Hawkins  : 1950  MRN: [id number]  Today's Date: 2022      Visit Dx:     ICD-10-CM ICD-9-CM   1. Small bowel obstruction (HCC)  K56.609 560.9   2. Difficulty walking  R26.2 719.7     Patient Active Problem List   Diagnosis   • Nicotine use   • Chest pain   • Shortness of breath   • Small bowel obstruction (HCC)   • COPD (chronic obstructive pulmonary disease) (HCC)     Past Medical History:   Diagnosis Date   • Arthritis    • Asthma    • COPD (chronic obstructive pulmonary disease) (HCC) 8/15/2022   • Injury of back    • Mood disorder (HCC)      Past Surgical History:   Procedure Laterality Date   • BACK SURGERY     • COLONOSCOPY     • EXPLORATORY LAPAROTOMY N/A 2012    repair of colon perforation after colonoscopy   • EYE SURGERY     • HYSTERECTOMY       PT Assessment (last 12 hours)     PT Evaluation and Treatment     Row Name 22 1100          Physical Therapy Time and Intention    Subjective Information complains of;fatigue (P)   -WM     Document Type evaluation (P)   -WM     Mode of Treatment individual therapy;physical therapy (P)   -WM     Patient Effort good (P)   -WM     Symptoms Noted During/After Treatment fatigue (P)   -WM     Comment Pt alert and oriented x4, pleasant and agreeable to PT services on this date. Pt's family at bedside, supportive and able to assist with pertinent subjective information. Pt reports prior to hospital admission she was independent with all functional mobility tasks utilizing rollator at home. Reports she feels fatigued likely secondary to NPO orders. (P)   -WM     Row Name 22 1100          General Information    Patient Profile Reviewed yes (P)   -WM     Patient Observations alert;cooperative;agree to therapy (P)   -WM     Prior Level of Function independent:;all household mobility;community mobility;gait;transfer;bed mobility (P)   with use  of rollator  -     Equipment Currently Used at Home rollator;shower chair;commode (P)   -     Existing Precautions/Restrictions no known precautions/restrictions (P)   -     Barriers to Rehab none identified (P)   -[facility] Name 08/16/22 1100          Living Environment    Current Living Arrangements home (P)   -     Home Accessibility stairs to enter home (P)   -     People in Home alone;other (see comments) (P)   Family member lives next door, able to assist as needed  -     Primary Care Provided by self (P)   -[facility] Name 08/16/22 1100          Home Main Entrance    Number of Stairs, Main Entrance two (P)   -     Stair Railings, Main Entrance none (P)   -[facility] Name 08/16/22 1100          Home Use of Assistive/Adaptive Equipment    Equipment Currently Used at Home rollator (P)   -[facility] Name 08/16/22 1100          Range of Motion (ROM)    Range of Motion bilateral lower extremities;ROM is WFL (P)   -WM     Row Name 08/16/22 1100          Strength (Manual Muscle Testing)    Strength (Manual Muscle Testing) bilateral lower extremities;strength is WFL (P)   -WM     Row Name 08/16/22 1100          Bed Mobility    Bed Mobility supine-sit;sit-supine (P)   -     Supine-Sit Pur[place] (Bed Mobility) modified independence (P)   -     Sit-Supine [place] (Bed Mobility) modified independence (P)   -     Assistive Device (Bed Mobility) bed rails;head of bed elevated (P)   -[facility] Name 08/16/22 1100          Transfers    Transfers sit-stand transfer (P)   -     Sit-Stand Pur[place] (Transfers) modified independence (P)   -[facility] Name 08/16/22 1100          Sit-Stand Transfer    Assistive Device (Sit-Stand Transfers) walker, front-wheeled (P)   -[facility] Name 08/16/22 1100          Gait/Stairs (Locomotion)    Gait/Stairs Locomotion gait/ambulation assistive device (P)   -     Purdy Level (Gait) supervision;verbal cues (P)   -     Assistive Device (Gait) walker,  front-wheeled (P)   -WM     Distance in Feet (Gait) 400 (P)   -WM     Pattern (Gait) 4-point;step-through (P)   -WM     Deviations/Abnormal Patterns (Gait) more decreased;gait speed decreased;stride length decreased (P)   -WM     Bilateral Gait Deviations forward flexed posture (P)   -WM     Comment, (Gait/Stairs) Verbal cues provided to correct forward flexed posture and increase step/stride length, pt very receptive to verbal cues and able to correct gait abnormalities with supervision (P)   -     Row Name 08/16/22 1100          Safety Issues, Functional Mobility    Impairments Affecting Function (Mobility) other (see comments) (P)   Pt does not present with any outstanding deficits that require skilled PT services within this facility  -     Row Name 08/16/22 1100          Balance    Balance Assessment standing dynamic balance (P)   -WM     Dynamic Standing Balance modified independence (P)   -WM     Position/Device Used, Standing Balance walker, front-wheeled;supported (P)   -WM     Comment, Balance WFL (P)   -     Row Name 08/16/22 1100          Plan of Care Review    Plan of Care Reviewed With patient (P)   -WM     Outcome Evaluation Pt does not present with any outstanding functional deficits that would require skilled PT services within this facility as she is able to perform functional transfers and ambulation with modified independence utilizing rolling walker. She is currently performing within baseline levels of functional mobility. PT recommends pt follow-up with home-health services upon discharge to ensure optimal home set-up and decrease falls risk. PT recommend patient use rolling walker instead of rollator upon discharge due to increased stability. (P)   -     Row Name 08/16/22 1100          Positioning and Restraints    Pre-Treatment Position in bed (P)   -WM     Post Treatment Position bed (P)   -WM     In Bed supine;call light within reach;with family/caregiver (P)   -     Row Name  08/16/22 1100          Therapy Assessment/Plan (PT)    Criteria for Skilled Interventions Met (PT) no;no problems identified which require skilled intervention (P)   -WM     Therapy Frequency (PT) evaluation only (P)   -WM     Problem List (PT) other (see comments) (P)   No significant problems affecting mobility identified per today's assessment  -WM     Activity Limitations Related to Problem List (PT) other (see comments) (P)   Pt does not present with any outstanding activity limitations as she is functioning within baseline levels with functional mobility tasks.  -WM     Row Name 08/16/22 1100          PT Evaluation Complexity    History, PT Evaluation Complexity 1-2 personal factors and/or comorbidities (P)   -WM     Examination of Body Systems (PT Eval Complexity) 1-2 elements (P)   -WM     Clinical Presentation (PT Evaluation Complexity) stable (P)   -WM     Clinical Decision Making (PT Evaluation Complexity) low complexity (P)   -WM     Overall Complexity (PT Evaluation Complexity) low complexity (P)   -WM     Row Name 08/16/22 1100          Therapy Plan Review/Discharge Plan (PT)    Therapy Plan Review (PT) evaluation/treatment results reviewed;participants included;patient;daughter;son;spouse/significant other (P)   -WM     Patient/Family Concerns, Anticipated Discharge Disposition (PT) Family reported no questions or concerns with discharge planning of homehealth services with assist (P)   -           User Key  (r) = Recorded By, (t) = Taken By, (c) = Cosigned By    Initials Name Provider Type    Franko Ch, PT Student PT Student                Physical Therapy Education                 Title: PT OT SLP Therapies (Done)     Topic: Physical Therapy (Done)     Point: Mobility training (Done)     Learning Progress Summary           Patient Acceptance, E, VU by  at 8/16/2022 1149                   Point: Home exercise program (Done)     Learning Progress Summary           Patient Acceptance, E, VU  by  at 8/16/2022 1149                   Point: Body mechanics (Done)     Learning Progress Summary           Patient Acceptance, E, VU by  at 8/16/2022 1149                   Point: Precautions (Done)     Learning Progress Summary           Patient Acceptance, E, VU by  at 8/16/2022 1149                               User Key     Initials Effective Dates Name Provider Type Discipline     06/06/22 -  Franko Adorno, PT Student PT Student PT              PT Recommendation and Plan  Anticipated Discharge Disposition (PT): (P) home with home health, home with assist  Therapy Frequency (PT): (P) evaluation only  Plan of Care Reviewed With: (P) patient  Outcome Evaluation: (P) Pt does not present with any outstanding functional deficits that would require skilled PT services within this facility as she is able to perform functional transfers and ambulation with modified independence utilizing rolling walker. She is currently performing within baseline levels of functional mobility. PT recommends pt follow-up with home-health services upon discharge to ensure optimal home set-up and decrease falls risk. PT recommend patient use rolling walker instead of rollator upon discharge due to increased stability.   Outcome Measures     Row Name 08/16/22 1100             How much help from another person do you currently need...    Turning from your back to your side while in flat bed without using bedrails? 4 (P)   -WM      Moving from lying on back to sitting on the side of a flat bed without bedrails? 4 (P)   -WM      Moving to and from a bed to a chair (including a wheelchair)? 4 (P)   -WM      Standing up from a chair using your arms (e.g., wheelchair, bedside chair)? 4 (P)   -WM      Climbing 3-5 steps with a railing? 4 (P)   -WM      To walk in hospital room? 4 (P)   -WM      AM-PAC 6 Clicks Score (PT) 24 (P)   -WM              Functional Assessment    Outcome Measure Options AM-PAC 6 Clicks Basic Mobility (PT) (P)    -WM            User Key  (r) = Recorded By, (t) = Taken By, (c) = Cosigned By    Initials Name Provider Type     Franko Adorno, PT Student PT Student                 Time Calculation:    PT Charges     Row Name 08/16/22 1138             Time Calculation    PT Received On 08/16/22 (P)   -WM              Untimed Charges    PT Eval/Re-eval Minutes 45 (P)   -WM              Total Minutes    Untimed Charges Total Minutes 45 (P)   -WM       Total Minutes 45 (P)   -WM            User Key  (r) = Recorded By, (t) = Taken By, (c) = Cosigned By    Initials Name Provider Type     Franko Adorno, PT Student PT Student              Therapy Charges for Today     Code Description Service Date Service Provider Modifiers Qty    [id number] HC PT EVAL LOW COMPLEXITY 3 8/16/2022 Franko Adorno, PT Student GP 1          PT G-Codes  Outcome Measure Options: (P) AM-PAC 6 Clicks Basic Mobility (PT)  AM-PAC 6 Clicks Score (PT): (P) 24    Tila Abdul PT Student  8/16/2022

## 2022-08-16 NOTE — PLAN OF CARE
Goal Outcome Evaluation:  Plan of Care Reviewed With: (P) patient           Outcome Evaluation: (P) Pt does not present with any outstanding functional deficits that would require skilled PT services within this facility as she is able to perform functional transfers and ambulation with modified independence utilizing rolling walker. She is currently performing within baseline levels of functional mobility. PT recommends pt follow-up with home-health services upon discharge to ensure optimal home set-up and decrease falls risk. PT recommend patient use rolling walker instead of rollator upon discharge due to increased stability.

## 2022-08-16 NOTE — PROGRESS NOTES
POST OP PROGRESS NOTE     Patient Name:  Fanta Hawkins  YOB: 1950  [id number]   LOS: 5 days   * No surgery found *  Patient Care Team:  Eduard Minaya MD as PCP - General (Internal Medicine)        Subjective     Interval History:  VSS, afebrile.  Reports abdomen hurts more today.  Unclear if she is passing flatus or not.  Xray today without improvement.       Review of Systems:    All complete review of systems was performed and all are negative except what is documented in the HPI.       Objective     Vital Signs  Temp:  [97.7 °F (36.5 °C)-98.8 °F (37.1 °C)] 97.7 °F (36.5 °C)  Heart Rate:  [78-90] 81  Resp:  [20-22] 22  BP: (139-162)/(75-88) 154/80    Physical Exam:     General Appearance:   Alert, cooperative, in no acute distress   Head:   Normocephalic, without obvious abnormality, atraumatic   Eyes:            Lids and lashes normal, conjunctivae and sclerae normal, no     Icterus    Ears:   Ears appear intact with no abnormalities noted   Throat:  No oral lesions, oral mucosa moist  Neck: supple, trachea midline    Lungs:    Respirations regular, even and unlabored    Heart:   Regular rhythm and normal rate   Chest Wall:   No abnormalities observed   Abdomen:    no masses, no organomegaly, soft tender, mildly distended, no guarding   Extremities:  Moves all extremities well, no edema, no cyanosis, no               redness   Skin:  No bleeding, bruising or rash   Neurologic:  A/Ox3 with no deficits       Results Review:     I reviewed the patient's new clinical results including CBC and BMP.     LABS:  Lab Results (last 72 hours)     Procedure Component Value Units Date/Time    Basic Metabolic Panel [[id number]]  (Abnormal) Collected: 08/16/22 0508    Specimen: Blood Updated: 08/16/22 0603     Glucose 71 mg/dL      BUN 18 mg/dL      Creatinine 0.67 mg/dL      Sodium 136 mmol/L      Potassium 3.3 mmol/L      Chloride 97 mmol/L      CO2 23.8 mmol/L      Calcium 8.5 mg/dL       BUN/Creatinine Ratio 26.9     Anion Gap 15.2 mmol/L      eGFR 94.7 mL/min/1.73      Comment: National Kidney Foundation and American Society of Nephrology (ASN) Task Force recommended calculation based on the Chronic Kidney Disease Epidemiology Collaboration (CKD-EPI) equation refit without adjustment for race.       Narrative:      GFR Normal >60  Chronic Kidney Disease <60  Kidney Failure <15      CBC & Differential [[id number]]  (Abnormal) Collected: 08/16/22 0508    Specimen: Blood Updated: 08/16/22 0555    Narrative:      The following orders were created for panel order CBC & Differential.  Procedure                               Abnormality         Status                     ---------                               -----------         ------                     CBC Auto Differential[[id number]]        Abnormal            Final result                 Please view results for these tests on the individual orders.    CBC Auto Differential [[id number]]  (Abnormal) Collected: 08/16/22 0508    Specimen: Blood Updated: 08/16/22 0555     WBC 6.96 10*3/mm3      RBC 3.72 10*6/mm3      Hemoglobin 12.4 g/dL      Hematocrit 36.3 %      MCV 97.6 fL      MCH 33.3 pg      MCHC 34.2 g/dL      RDW 11.9 %      RDW-SD 42.3 fl      MPV 9.2 fL      Platelets 258 10*3/mm3      Neutrophil % 74.8 %      Lymphocyte % 11.1 %      Monocyte % 12.6 %      Eosinophil % 0.9 %      Basophil % 0.3 %      Immature Grans % 0.3 %      Neutrophils, Absolute 5.21 10*3/mm3      Lymphocytes, Absolute 0.77 10*3/mm3      Monocytes, Absolute 0.88 10*3/mm3      Eosinophils, Absolute 0.06 10*3/mm3      Basophils, Absolute 0.02 10*3/mm3      Immature Grans, Absolute 0.02 10*3/mm3      nRBC 0.0 /100 WBC     Comprehensive Metabolic Panel [[id number]]  (Abnormal) Collected: 08/15/22 0624    Specimen: Blood Updated: 08/15/22 0720     Glucose 89 mg/dL      BUN 20 mg/dL      Creatinine 0.64 mg/dL      Sodium 136 mmol/L      Potassium 3.4 mmol/L      Chloride 99  mmol/L      CO2 28.6 mmol/L      Calcium 8.7 mg/dL      Total Protein 5.9 g/dL      Albumin 3.00 g/dL      ALT (SGPT) 6 U/L      AST (SGOT) 10 U/L      Alkaline Phosphatase 54 U/L      Total Bilirubin 0.4 mg/dL      Globulin 2.9 gm/dL      A/G Ratio 1.0 g/dL      BUN/Creatinine Ratio 31.3     Anion Gap 8.4 mmol/L      eGFR 95.8 mL/min/1.73      Comment: National Kidney Foundation and American Society of Nephrology (ASN) Task Force recommended calculation based on the Chronic Kidney Disease Epidemiology Collaboration (CKD-EPI) equation refit without adjustment for race.       Narrative:      GFR Normal >60  Chronic Kidney Disease <60  Kidney Failure <15      CBC (No Diff) [[id number]]  (Abnormal) Collected: 08/15/22 0624    Specimen: Blood Updated: 08/15/22 0659     WBC 4.09 10*3/mm3      RBC 3.33 10*6/mm3      Hemoglobin 11.1 g/dL      Hematocrit 32.4 %      MCV 97.3 fL      MCH 33.3 pg      MCHC 34.3 g/dL      RDW 12.0 %      RDW-SD 42.9 fl      MPV 9.0 fL      Platelets 210 10*3/mm3           IMAGING:  Imaging Results (Last 72 Hours)     Procedure Component Value Units Date/Time    XR Abdomen Flat & Upright [[id number]] Collected: 08/16/22 0540     Updated: 08/16/22 0543    Narrative:      PROCEDURE: XR ABDOMEN FLAT AND UPRIGHT     COMPARISON: [facility], CR, XR ABDOMEN KUB, 8/15/2022, 9:08.  [facility], CT, CT ABDOMEN PELVIS W CONTRAST, 8/11/2022, 8:04.  [facility], CR, XR   ABDOMEN KUB, 8/15/2022, 15:28.     INDICATIONS: bowel obstruction     FINDINGS:   Stable gastric suction tube terminating in the stomach.  There is evidence of prior L4-L5 posterior   lumbar fusion.  There are moderate thoracolumbar degenerative changes.  There is persistent   bibasilar lung opacity.  There is persistent relatively diffuse mild small bowel distention.  No   pneumatosis intestinalis or pneumoperitoneum.  There are multiple phleboliths within the pelvis and   there are tendon anchors in  the superior pubic rami.  There are prominent costochondral   calcifications.  No definite pathologic abdominal calcification.       Impression:         1. Persistent small bowel distention concerning for ongoing obstruction.  2. Gastric suction tube terminates in the stomach.            MELODIE NGUYEN MD         Electronically Signed and Approved By: MELODIE NGUYEN MD on 8/16/2022 at 5:40                     XR Abdomen KUB [[id number]] Collected: 08/15/22 1634     Updated: 08/15/22 1637    Narrative:      PROCEDURE: XR ABDOMEN KUB     COMPARISON: [facility], CR, XR ABDOMEN KUB, 8/15/2022, 9:08.     INDICATIONS: NG tube placement verification     FINDINGS:   An NG tube has its tip in the stomach.       Impression:       NG tube with tip in stomach.            SHARDA MANUEL MD         Electronically Signed and Approved By: SHARDA MANUEL MD on 8/15/2022 at 16:34                     XR Abdomen KUB [[id number]] Collected: 08/15/22 1007     Updated: 08/15/22 1010    Narrative:      PROCEDURE: XR ABDOMEN KUB     COMPARISON: [facility], CR, XR ABDOMEN KUB, 8/11/2022, 11:12.  [facility], CR, XR ABDOMEN KUB, 8/11/2022, 17:55.  [facility], CT, CT ABDOMEN PELVIS W   CONTRAST, 8/11/2022, 8:04.  [facility], CR, XR ABDOMEN KUB, 8/11/2022, 18:44.     INDICATIONS: small bowel obstruction     FINDINGS:   Single supine view of the abdomen performed.  Postoperative changes lumbar spine.  There are   postoperative changes project over the pelvis and lower lumbar spine.  Multiple pelvic   calcifications reflect phleboliths.  Paucity of large bowel gas.  Persistently dilated gas-filled   small bowel loops measure up to 3.6 centimeters.  This may be greater than previous CT scan from   8/11/2022.  The upper abdomen was not imaged.        Impression:         1. Dilated gas-filled small bowel loops with relative paucity of large bowel gas most concerning   for small  bowel obstruction.  Small bowel measures up to 3.6 centimeters may be slightly greater   than prior CT scan 8/11/2022.  2. The upper abdomen and NG tube were not imaged.            LEEANN GRIFFIN MD         Electronically Signed and Approved By: LEEANN GRIFFIN MD on 8/15/2022 at 10:07                           Medications:    Current Facility-Administered Medications:   •  acetaminophen (TYLENOL) tablet 650 mg, 650 mg, Oral, Q4H PRN **OR** acetaminophen (TYLENOL) 160 MG/5ML solution 650 mg, 650 mg, Oral, Q4H PRN **OR** acetaminophen (TYLENOL) suppository 650 mg, 650 mg, Rectal, Q4H PRN, Vish Vieira MD  •  ALPRAZolam (XANAX) tablet 0.5 mg, 0.5 mg, Oral, Q8H PRN, Vish Vieira MD, 0.5 mg at 08/14/22 2136  •  aluminum-magnesium hydroxide-simethicone (MAALOX MAX) 400-400-40 MG/5ML suspension 15 mL, 15 mL, Oral, Q6H PRN, Vish Vieira MD  •  bisacodyl (DULCOLAX) EC tablet 5 mg, 5 mg, Oral, Daily PRN, Vish Vieira MD  •  sennosides-docusate (PERICOLACE) 8.6-50 MG per tablet 2 tablet, 2 tablet, Oral, BID, 2 tablet at 08/15/22 0829 **AND** polyethylene glycol (MIRALAX) packet 17 g, 17 g, Oral, Daily PRN, 17 g at 08/15/22 0829 **AND** [DISCONTINUED] bisacodyl (DULCOLAX) EC tablet 5 mg, 5 mg, Oral, Daily PRN **AND** bisacodyl (DULCOLAX) suppository 10 mg, 10 mg, Rectal, Daily PRN, Vish Vieira MD  •  dextrose 5 % and sodium chloride 0.9 % with KCl 20 mEq/L infusion, 100 mL/hr, Intravenous, Continuous, Ruchi Villarreal, JIN  •  Enoxaparin Sodium (LOVENOX) syringe 40 mg, 40 mg, Subcutaneous, Q24H, Vish Vieira MD, 40 mg at 08/15/22 1226  •  famotidine (PEPCID) injection 20 mg, 20 mg, Intravenous, Q12H, Ruchi Villarreal APRN  •  HYDROcodone-acetaminophen (NORCO)  MG per tablet 1 tablet, 1 tablet, Oral, Q4H PRN, Vish Vieira MD, 1 tablet at 08/14/22 2136  •  HYDROcodone-acetaminophen (NORCO) 5-325 MG per tablet 1 tablet, 1 tablet, Oral, Q4H PRN, Vish Vieira MD, 1 tablet  at 08/15/22 1108  •  HYDROmorphone (DILAUDID) injection 0.5 mg, 0.5 mg, Intravenous, Q2H PRN, 0.5 mg at 08/16/22 0732 **AND** naloxone (NARCAN) injection 0.4 mg, 0.4 mg, Intravenous, Q5 Min PRN, Vish Vieira MD  •  ipratropium-albuterol (DUO-NEB) nebulizer solution 3 mL, 3 mL, Nebulization, Q4H PRN, Vish Vieira MD  •  melatonin tablet 5 mg, 5 mg, Oral, Nightly PRN, Vish Vieira MD  •  mineral oil liquid 45 mL, 45 mL, Oral, Daily, Maria C Aranda APRN  •  ondansetron (ZOFRAN) injection 4 mg, 4 mg, Intravenous, Q4H PRN, Mitchel Vicente MD, 4 mg at 08/16/22 0731  •  phenol (CHLORASEPTIC) 1.4 % liquid 1 spray, 1 spray, Mouth/Throat, Q2H PRN, Maria C Aranda APRN, 1 spray at 08/16/22 0732  •  sodium chloride 0.9 % flush 10 mL, 10 mL, Intravenous, PRN, Mark Davis,   •  sodium chloride 0.9 % flush 10 mL, 10 mL, Intravenous, PRN, Vish Vieira MD  •  sodium chloride 0.9 % flush 10 mL, 10 mL, Intravenous, Q12H, Vish Vieira MD, 10 mL at 08/16/22 0732  •  sodium chloride 0.9 % infusion 40 mL, 40 mL, Intravenous, PRN, Vish Vieira MD    Assessment & Plan       Nicotine use    Small bowel obstruction (HCC)    COPD (chronic obstructive pulmonary disease) (HCC)   -cont NG tube today   -increase mineral oil to 45 ml via ng tube   -ambulate in hallway at least 4 times every day   -xray in AM   -if no improvement by Thursday will do CT with oral contrast and plan for exploratory laparotomy on Friday. Discussed with patient and family.     JIN Schmidt  08/16/22  08:55 EDT    Electronically signed by Maria C Aranda, JIN, 08/16/22, 8:55 AM EDT.

## 2022-08-16 NOTE — PLAN OF CARE
Goal Outcome Evaluation:   Abdomen remains tender, tinkling bowel sounds.  NG to LWS patent and draining green drainage.  Continues to have nausea, relieved with zofran.

## 2022-08-17 ENCOUNTER — APPOINTMENT (OUTPATIENT)
Dept: GENERAL RADIOLOGY | Facility: HOSPITAL | Age: 72
End: 2022-08-17

## 2022-08-17 PROCEDURE — 74019 RADEX ABDOMEN 2 VIEWS: CPT

## 2022-08-17 PROCEDURE — 25010000002 ENOXAPARIN PER 10 MG: Performed by: STUDENT IN AN ORGANIZED HEALTH CARE EDUCATION/TRAINING PROGRAM

## 2022-08-17 PROCEDURE — 25010000002 ONDANSETRON PER 1 MG: Performed by: STUDENT IN AN ORGANIZED HEALTH CARE EDUCATION/TRAINING PROGRAM

## 2022-08-17 PROCEDURE — 25010000002 HYDROMORPHONE 1 MG/ML SOLUTION: Performed by: INTERNAL MEDICINE

## 2022-08-17 PROCEDURE — 99232 SBSQ HOSP IP/OBS MODERATE 35: CPT | Performed by: NURSE PRACTITIONER

## 2022-08-17 RX ADMIN — DEXTROSE MONOHYDRATE, SODIUM CHLORIDE, AND POTASSIUM CHLORIDE 100 ML/HR: 50; 9; 1.49 INJECTION, SOLUTION INTRAVENOUS at 23:09

## 2022-08-17 RX ADMIN — ONDANSETRON 4 MG: 2 INJECTION INTRAMUSCULAR; INTRAVENOUS at 10:07

## 2022-08-17 RX ADMIN — PHENOL 1 SPRAY: 1.5 LIQUID ORAL at 10:06

## 2022-08-17 RX ADMIN — PHENOL 1 SPRAY: 1.5 LIQUID ORAL at 14:41

## 2022-08-17 RX ADMIN — ONDANSETRON 4 MG: 2 INJECTION INTRAMUSCULAR; INTRAVENOUS at 18:33

## 2022-08-17 RX ADMIN — SENNOSIDES AND DOCUSATE SODIUM 2 TABLET: 50; 8.6 TABLET ORAL at 09:54

## 2022-08-17 RX ADMIN — MINERAL OIL 45 ML: 1000 SOLUTION ORAL at 09:54

## 2022-08-17 RX ADMIN — DEXTROSE MONOHYDRATE, SODIUM CHLORIDE, AND POTASSIUM CHLORIDE 100 ML/HR: 50; 9; 1.49 INJECTION, SOLUTION INTRAVENOUS at 15:11

## 2022-08-17 RX ADMIN — Medication 10 ML: at 09:54

## 2022-08-17 RX ADMIN — SENNOSIDES AND DOCUSATE SODIUM 2 TABLET: 50; 8.6 TABLET ORAL at 19:41

## 2022-08-17 RX ADMIN — FAMOTIDINE 20 MG: 10 INJECTION INTRAVENOUS at 09:54

## 2022-08-17 RX ADMIN — ENOXAPARIN SODIUM 30 MG: 100 INJECTION SUBCUTANEOUS at 12:00

## 2022-08-17 RX ADMIN — HYDROMORPHONE HYDROCHLORIDE 0.5 MG: 1 INJECTION, SOLUTION INTRAMUSCULAR; INTRAVENOUS; SUBCUTANEOUS at 10:07

## 2022-08-17 RX ADMIN — HYDROMORPHONE HYDROCHLORIDE 0.5 MG: 1 INJECTION, SOLUTION INTRAMUSCULAR; INTRAVENOUS; SUBCUTANEOUS at 18:33

## 2022-08-17 RX ADMIN — FAMOTIDINE 20 MG: 10 INJECTION INTRAVENOUS at 19:41

## 2022-08-17 RX ADMIN — ONDANSETRON 4 MG: 2 INJECTION INTRAMUSCULAR; INTRAVENOUS at 05:26

## 2022-08-17 NOTE — PROGRESS NOTES
[facility]     Progress Note    Patient Name: Fanat Hawkins  : 1950  MRN: [id number]  Primary Care Physician:  Eduard Minaya MD  Date of admission: 2022    Subjective    Patient complaining of nausea and vomiting  Still no bowel movements. No gas  Ng in place again  On going obstruction  Surgery consulted with aggressive bowel regiment       Facility-Administered Medications as of 2022   Medication Dose Route Frequency Provider Last Rate Last Admin   • acetaminophen (TYLENOL) tablet 650 mg  650 mg Nasogastric Q4H PRN Mitchel Vicente MD        Or   • acetaminophen (TYLENOL) 160 MG/5ML solution 650 mg  650 mg Nasogastric Q4H PRN Mitchel Vicente MD        Or   • acetaminophen (TYLENOL) suppository 650 mg  650 mg Rectal Q4H PRN Mitchel Vicente MD       • ALPRAZolam (XANAX) tablet 0.5 mg  0.5 mg Nasogastric Q8H PRN Mitchel Vicente MD       • aluminum-magnesium hydroxide-simethicone (MAALOX MAX) 400-400-40 MG/5ML suspension 15 mL  15 mL Nasogastric Q6H PRN Mitchel Vicente MD       • [COMPLETED] Benzocaine 20 % aerosol 1 spray  1 spray Mouth/Throat Once Maria C Aranda APRN   1 spray at 08/15/22 1424   • bisacodyl (DULCOLAX) EC tablet 5 mg  5 mg Oral Daily PRN Vish Vieira MD       • sennosides-docusate (PERICOLACE) 8.6-50 MG per tablet 2 tablet  2 tablet Nasogastric BID Mitchel Vicente MD   2 tablet at 22 2124    And   • polyethylene glycol (MIRALAX) packet 17 g  17 g Nasogastric Daily PRN Mitchel Vicente MD        And   • bisacodyl (DULCOLAX) suppository 10 mg  10 mg Rectal Daily PRN Mitchel Vicente MD       • dextrose 5 % and sodium chloride 0.9 % with KCl 20 mEq/L infusion  100 mL/hr Intravenous Continuous Ruchi Villarreal APRN 100 mL/hr at 22 100 mL/hr at 22   • Enoxaparin Sodium (LOVENOX) syringe 30 mg  30 mg Subcutaneous Q24H Mitchel Vicente MD       • [COMPLETED] famotidine (PEPCID) injection 20  mg  20 mg Intravenous Once Mark Davis, DO   20 mg at 08/11/22 0736   • famotidine (PEPCID) injection 20 mg  20 mg Intravenous Q12H Ruchi Villarreal APRN   20 mg at 08/16/22 2125   • [COMPLETED] fentaNYL citrate (PF) (SUBLIMAZE) injection 50 mcg  50 mcg Intravenous Once Mark Davis, DO   50 mcg at 08/11/22 0736   • HYDROcodone-acetaminophen (NORCO)  MG per tablet 1 tablet  1 tablet Nasogastric Q4H PRN Mitchel Vicente MD       • HYDROcodone-acetaminophen (NORCO) 5-325 MG per tablet 1 tablet  1 tablet Nasogastric Q4H PRN Mitchel Vicente MD       • HYDROmorphone (DILAUDID) injection 0.5 mg  0.5 mg Intravenous Q2H PRN Vish Vieira MD   0.5 mg at 08/16/22 1915    And   • naloxone (NARCAN) injection 0.4 mg  0.4 mg Intravenous Q5 Min PRN Vish Vieira MD       • [COMPLETED] iopamidol (ISOVUE-370) 76 % injection 100 mL  100 mL Intravenous Once in imaging Mark Davis DO   100 mL at 08/11/22 0812   • ipratropium-albuterol (DUO-NEB) nebulizer solution 3 mL  3 mL Nebulization Q4H PRN Vish Vieira MD       • [COMPLETED] lactulose (CHRONULAC) 10 GM/15ML solution 30 g  30 g Oral Once Vish Vieira MD   30 g at 08/13/22 0906   • melatonin tablet 5 mg  5 mg Nasogastric Nightly PRN Mitchel Vicente MD       • [COMPLETED] metoclopramide (REGLAN) injection 10 mg  10 mg Intravenous Q6H Mitchel Vicente MD   10 mg at 08/14/22 0848   • mineral oil liquid 45 mL  45 mL Oral Daily Maria C Aranda APRN   45 mL at 08/16/22 0946   • [COMPLETED] ondansetron (ZOFRAN) injection 4 mg  4 mg Intravenous Once Africa Donaldson MD   4 mg at 08/11/22 0622   • [COMPLETED] ondansetron (ZOFRAN) injection 4 mg  4 mg Intravenous Once Mark Davis DO   4 mg at 08/11/22 0736   • [COMPLETED] ondansetron (ZOFRAN) injection 4 mg  4 mg Intravenous Once Mark Davis DO   4 mg at 08/11/22 0935   • ondansetron (ZOFRAN) injection 4 mg  4 mg Intravenous Q4H PRN Mitchel Vicente MD    4 mg at 22 0526   • phenol (CHLORASEPTIC) 1.4 % liquid 1 spray  1 spray Mouth/Throat Q2H PRN Maria C Aranda APRN   1 spray at 22 1917   • [COMPLETED] potassium chloride (MICRO-K) CR capsule 40 mEq  40 mEq Oral Once Vish Vieira MD   40 mEq at 22 0906   • [] potassium chloride 20 mEq in 50 mL IVPB  20 mEq Intravenous Q1H Vish Vieira MD 50 mL/hr at 22 1145 20 mEq at 22 1145   • [COMPLETED] sodium chloride 0.9 % bolus 1,000 mL  1,000 mL Intravenous Once Mark Davis DO   Stopped at 22 0806   • sodium chloride 0.9 % flush 10 mL  10 mL Intravenous PRN Mark Davis,        • sodium chloride 0.9 % flush 10 mL  10 mL Intravenous PRN Vish Vieira MD       • sodium chloride 0.9 % flush 10 mL  10 mL Intravenous Q12H Vish Vieira MD   10 mL at 226   • sodium chloride 0.9 % infusion 40 mL  40 mL Intravenous PRN Vish Vieira MD              Review of Systems  Constitutional:        Weakness tiredness fatigue  Eyes:                       No blurry vision, eye discharge, eye irritation, eye pain  HEENT:                   No acute hair loss, earache and discharge, nasal congestion or discharge, sore throat, postnasal drip  Respiratory:           No shortness of breath coughing sputum production wheezing hemoptysis pleuritic chest pain  Cardiovascular:     No chest pain, orthopnea, PND, dizziness, palpitation, lower extremity edema  Gastrointestinal:   No nausea vomiting diarrhea abdominal pain constipation  Genitourinary:       No urinary incontinence, hesitancy, frequency, urgency, dysuria  Hematologic:         No bruising, bleeding, pallor, lymphadenopathy  Endocrine:            No coldness, hot flashes, polyuria, abnormal hair growth  Musculoskeletal:    No body pains, aches, arthritic pains, muscle pain ,muscle wasting  Psychiatric:          No low or high mood, anxiety, hallucinations, delusions  Skin.                      No rash,  ulcers, bruising, itching  Neurological:        No confusion, headache, focal weakness, numbness, dysphasia    Objective   Objective     Vitals:   Temp:  [97.2 °F (36.2 °C)-98.6 °F (37 °C)] 98.2 °F (36.8 °C)  Heart Rate:  [78-89] 80  Resp:  [18-22] 18  BP: (137-170)/(65-85) 170/83  Physical Exam    Constitutional: Awake, alert responsive, conversant, no obvious distress              Psychiatric:  Appropriate affect, cooperative   Neurologic:  Awake alert ,oriented x 3, strength symmetric in all extremities, Cranial Nerves grossly intact to confrontation, speech clear   Eyes:   PERRLA, sclerae anicteric, no conjunctival injection   HEENT:  Moist mucous membranes, no nasal or eye discharge, no throat congestion   Neck:   Supple, no thyromegaly, no lymphadenopathy, trachea midline, no elevated JVD   Respiratory:  Clear to auscultation bilaterally, nonlabored respirations    Cardiovascular: RRR, no murmurs, rubs, or gallops, palpable pedal pulses bilaterally, No bilateral ankle edema   Gastrointestinal: _ve bowel sounds, soft, nontender, nondistended, no organomegaly   Musculoskeletal:  No clubbing or cyanosis to extremities,muscle wasting, joint swelling, muscle weakness             Skin:                      No rashes, bruising, skin ulcers, petechiae or ecchymosis    Result Review    Result Review:  I have personally reviewed the results from the time of this admission to 8/17/2022 08:14 EDT and agree with these findings:  [x]  Laboratory  [x]  Microbiology  [x]  Radiology  []  EKG/Telemetry   []  Cardiology/Vascular   []  Pathology  []  Old records  []  Other:    Assessment & Plan   Assessment / Plan       Active Hospital Problems:  Active Hospital Problems    Diagnosis    • COPD (chronic obstructive pulmonary disease) (HCC)    • Small bowel obstruction (HCC)    • Nicotine use     71 y.o. female With past medical history significant for COPD, gerd, constipation, osteoarthritis came to the emergency room complaining  of nausea vomiting abdominal pain,CT of the abdomen and pelvis showed small bowel obstruction. Has prior hx of abdominal surgery and bowel obstruction. NG placed and decompressed with improvement in abdominal pain and removed and again placed 8/16 due to no bowel movements and surgery consulted with aggressive bowel regiment and possible surgery     Plan:   Continue ng suction  Bowel regiment as ordered by surgery  d5 water with kcl  Avoid narcotic pain medicines as much as possible  NPO for now  Will reassess daily to remove the ng tube

## 2022-08-17 NOTE — PROGRESS NOTES
History and Physical    Patient Name:  Fanta Hawkins  YOB: 1950  [id number]    Referring Provider: Provider, No Known      Patient Care Team:  Eduard Minaya MD as PCP - General (Internal Medicine)    Chief complaint: abd pain    Subjective .     History of present illness:   VSS, afebrile.  +flatus, +BM.  Less abdominal pain.  Xray shows still has some dilated loops of bowel. Abdominal xray still shows dilated loops of bowel.       History:  Past Medical History:   Diagnosis Date   • Arthritis    • Asthma    • COPD (chronic obstructive pulmonary disease) (Formerly [facility]) 8/15/2022   • Injury of back    • Mood disorder (Formerly [facility])        Past Surgical History:   Procedure Laterality Date   • BACK SURGERY     • COLONOSCOPY     • EXPLORATORY LAPAROTOMY N/A 03/19/2012    repair of colon perforation after colonoscopy   • EYE SURGERY     • HYSTERECTOMY         History reviewed. No pertinent family history.    Social History     Tobacco Use   • Smoking status: Current Every Day Smoker     Packs/day: 0.25     Types: Cigarettes   • Smokeless tobacco: Never Used   Vaping Use   • Vaping Use: Never used   Substance Use Topics   • Alcohol use: Never   • Drug use: Never       Review of Systems:  A complete ROS was performed and all are negative except for what is documented in the HPI.    MEDS:  Prior to Admission medications    Medication Sig Start Date End Date Taking? Authorizing Provider   diclofenac (VOLTAREN) 50 MG EC tablet Take 50 mg by mouth Daily. 10/8/21  Yes Senia Garcia MD   HYDROcodone-acetaminophen (NORCO) 5-325 MG per tablet Take 1 tablet by mouth Every 12 (Twelve) Hours As Needed. 10/14/21  Yes Senia aGrcia MD   albuterol sulfate  (90 Base) MCG/ACT inhaler Inhale 2 puffs Every 4 (Four) Hours As Needed.    Senia Garcia MD   docusate sodium 100 MG capsule Take 100 mg by mouth 2 (Two) Times a Day As Needed.    Senia Garcia MD   fluticasone (FLONASE) 50 MCG/ACT  nasal spray 1 spray by Each Nare route Daily As Needed.    Provider, MD Senia   omeprazole (prilOSEC) 10 MG capsule Take 10 mg by mouth Daily As Needed. 10/8/21   ProviderSenia MD        enoxaparin, 30 mg, Subcutaneous, Q24H  famotidine, 20 mg, Intravenous, Q12H  mineral oil, 45 mL, Oral, Daily  senna-docusate sodium, 2 tablet, Nasogastric, BID  sodium chloride, 10 mL, Intravenous, Q12H         dextrose 5 % and sodium chloride 0.9 % with KCl 20 mEq, 100 mL/hr, Last Rate: 100 mL/hr (08/16/22 1915)         Allergies:  Indomethacin, Meloxicam, Avelox [moxifloxacin], and Morphine    Objective     Vital Signs   Temp:  [97.2 °F (36.2 °C)-98.6 °F (37 °C)] 98.2 °F (36.8 °C)  Heart Rate:  [78-89] 80  Resp:  [18-22] 18  BP: (137-170)/(65-85) 170/83    Physical Exam:     General Appearance:    Alert, cooperative, in no acute distress   Head:    Normocephalic, without obvious abnormality, atraumatic   Ears:    Ears appear intact with no abnormalities noted   Throat:   No oral lesions, no thrush, oral mucosa moist   Neck:   Supple, trachea midline   Lungs:     Clear to auscultation,respirations regular    Heart:    Regular rhythm and normal rate   Chest Wall:    No abnormalities observed   Abdomen:     Normal bowel sounds, no masses, no organomegaly, soft     non-tender, mildly distended, no guarding   Extremities:   Moves all extremities well, no edema, no cyanosis, no            redness   Pulses:   Pulses palpable and equal bilaterally   Skin:   No bleeding, bruising or rash   Neurologic:   A/o x 3 with no deficits       Results Review: I have reviewed the patient's labs and imaging    LABS/IMAGING:    Imaging Results (Last 72 Hours)     Procedure Component Value Units Date/Time    XR Abdomen Flat & Upright [[id number]] Collected: 08/17/22 0603     Updated: 08/17/22 0606    Narrative:      PROCEDURE: XR ABDOMEN FLAT AND UPRIGHT     COMPARISON: [facility], CR, XR ABDOMEN FLAT AND UPRIGHT, 8/16/2022,  4:44.     INDICATIONS: SMALL BOWEL OBSTRUCTION     FINDINGS:   There are multiple distended small bowel loops, unchanged from the prior study.  Gastric suction   tube terminates in the stomach.  Stable basilar lung opacities.  No pneumatosis intestinalis or   evidence of pneumoperitoneum.  Soft tissue anchors noted in the superior pubic rami and posterior   fusion hardware noted in the lower lumbar spine.       Impression:       Stable small-bowel distention concerning for ongoing obstruction.            MELODIE NGUYEN MD         Electronically Signed and Approved By: MELODIE NGUYEN MD on 8/17/2022 at 6:03                     XR Abdomen Flat & Upright [[id number]] Collected: 08/16/22 0540     Updated: 08/16/22 0543    Narrative:      PROCEDURE: XR ABDOMEN FLAT AND UPRIGHT     COMPARISON: [facility], CR, XR ABDOMEN KUB, 8/15/2022, 9:08.  [facility], CT, CT ABDOMEN PELVIS W CONTRAST, 8/11/2022, 8:04.  [facility], CR, XR   ABDOMEN KUB, 8/15/2022, 15:28.     INDICATIONS: bowel obstruction     FINDINGS:   Stable gastric suction tube terminating in the stomach.  There is evidence of prior L4-L5 posterior   lumbar fusion.  There are moderate thoracolumbar degenerative changes.  There is persistent   bibasilar lung opacity.  There is persistent relatively diffuse mild small bowel distention.  No   pneumatosis intestinalis or pneumoperitoneum.  There are multiple phleboliths within the pelvis and   there are tendon anchors in the superior pubic rami.  There are prominent costochondral   calcifications.  No definite pathologic abdominal calcification.       Impression:         1. Persistent small bowel distention concerning for ongoing obstruction.  2. Gastric suction tube terminates in the stomach.            MELODIE NGUYEN MD         Electronically Signed and Approved By: MELODIE NGUYEN MD on 8/16/2022 at 5:40                     XR Abdomen KUB [[id number]] Collected: 08/15/22 2414      Updated: 08/15/22 1637    Narrative:      PROCEDURE: XR ABDOMEN KUB     COMPARISON: [facility], CR, XR ABDOMEN KUB, 8/15/2022, 9:08.     INDICATIONS: NG tube placement verification     FINDINGS:   An NG tube has its tip in the stomach.       Impression:       NG tube with tip in stomach.            SHARDA MANUEL MD         Electronically Signed and Approved By: SHARDA MANUEL MD on 8/15/2022 at 16:34                     XR Abdomen KUB [[id number]] Collected: 08/15/22 1007     Updated: 08/15/22 1010    Narrative:      PROCEDURE: XR ABDOMEN KUB     COMPARISON: [facility], CR, XR ABDOMEN KUB, 8/11/2022, 11:12.  [facility], CR, XR ABDOMEN KUB, 8/11/2022, 17:55.  [facility], CT, CT ABDOMEN PELVIS W   CONTRAST, 8/11/2022, 8:04.  [facility], CR, XR ABDOMEN KUB, 8/11/2022, 18:44.     INDICATIONS: small bowel obstruction     FINDINGS:   Single supine view of the abdomen performed.  Postoperative changes lumbar spine.  There are   postoperative changes project over the pelvis and lower lumbar spine.  Multiple pelvic   calcifications reflect phleboliths.  Paucity of large bowel gas.  Persistently dilated gas-filled   small bowel loops measure up to 3.6 centimeters.  This may be greater than previous CT scan from   8/11/2022.  The upper abdomen was not imaged.        Impression:         1. Dilated gas-filled small bowel loops with relative paucity of large bowel gas most concerning   for small bowel obstruction.  Small bowel measures up to 3.6 centimeters may be slightly greater   than prior CT scan 8/11/2022.  2. The upper abdomen and NG tube were not imaged.            LEEANN GRIFFIN MD         Electronically Signed and Approved By: LEEANN GRIFFIN MD on 8/15/2022 at 10:07                            Lab Results (last 72 hours)     Procedure Component Value Units Date/Time    Basic Metabolic Panel [[id number]]  (Abnormal) Collected: 08/16/22  0508    Specimen: Blood Updated: 08/16/22 0603     Glucose 71 mg/dL      BUN 18 mg/dL      Creatinine 0.67 mg/dL      Sodium 136 mmol/L      Potassium 3.3 mmol/L      Chloride 97 mmol/L      CO2 23.8 mmol/L      Calcium 8.5 mg/dL      BUN/Creatinine Ratio 26.9     Anion Gap 15.2 mmol/L      eGFR 94.7 mL/min/1.73      Comment: National Kidney Foundation and American Society of Nephrology (ASN) Task Force recommended calculation based on the Chronic Kidney Disease Epidemiology Collaboration (CKD-EPI) equation refit without adjustment for race.       Narrative:      GFR Normal >60  Chronic Kidney Disease <60  Kidney Failure <15      CBC & Differential [[id number]]  (Abnormal) Collected: 08/16/22 0508    Specimen: Blood Updated: 08/16/22 0555    Narrative:      The following orders were created for panel order CBC & Differential.  Procedure                               Abnormality         Status                     ---------                               -----------         ------                     CBC Auto Differential[[id number]]        Abnormal            Final result                 Please view results for these tests on the individual orders.    CBC Auto Differential [[id number]]  (Abnormal) Collected: 08/16/22 0508    Specimen: Blood Updated: 08/16/22 0555     WBC 6.96 10*3/mm3      RBC 3.72 10*6/mm3      Hemoglobin 12.4 g/dL      Hematocrit 36.3 %      MCV 97.6 fL      MCH 33.3 pg      MCHC 34.2 g/dL      RDW 11.9 %      RDW-SD 42.3 fl      MPV 9.2 fL      Platelets 258 10*3/mm3      Neutrophil % 74.8 %      Lymphocyte % 11.1 %      Monocyte % 12.6 %      Eosinophil % 0.9 %      Basophil % 0.3 %      Immature Grans % 0.3 %      Neutrophils, Absolute 5.21 10*3/mm3      Lymphocytes, Absolute 0.77 10*3/mm3      Monocytes, Absolute 0.88 10*3/mm3      Eosinophils, Absolute 0.06 10*3/mm3      Basophils, Absolute 0.02 10*3/mm3      Immature Grans, Absolute 0.02 10*3/mm3      nRBC 0.0 /100 WBC     Comprehensive Metabolic  Panel [[id number]]  (Abnormal) Collected: 08/15/22 0624    Specimen: Blood Updated: 08/15/22 0720     Glucose 89 mg/dL      BUN 20 mg/dL      Creatinine 0.64 mg/dL      Sodium 136 mmol/L      Potassium 3.4 mmol/L      Chloride 99 mmol/L      CO2 28.6 mmol/L      Calcium 8.7 mg/dL      Total Protein 5.9 g/dL      Albumin 3.00 g/dL      ALT (SGPT) 6 U/L      AST (SGOT) 10 U/L      Alkaline Phosphatase 54 U/L      Total Bilirubin 0.4 mg/dL      Globulin 2.9 gm/dL      A/G Ratio 1.0 g/dL      BUN/Creatinine Ratio 31.3     Anion Gap 8.4 mmol/L      eGFR 95.8 mL/min/1.73      Comment: National Kidney Foundation and American Society of Nephrology (ASN) Task Force recommended calculation based on the Chronic Kidney Disease Epidemiology Collaboration (CKD-EPI) equation refit without adjustment for race.       Narrative:      GFR Normal >60  Chronic Kidney Disease <60  Kidney Failure <15      CBC (No Diff) [[id number]]  (Abnormal) Collected: 08/15/22 0624    Specimen: Blood Updated: 08/15/22 0659     WBC 4.09 10*3/mm3      RBC 3.33 10*6/mm3      Hemoglobin 11.1 g/dL      Hematocrit 32.4 %      MCV 97.3 fL      MCH 33.3 pg      MCHC 34.3 g/dL      RDW 12.0 %      RDW-SD 42.9 fl      MPV 9.0 fL      Platelets 210 10*3/mm3              Result Review :     Assessment & Plan         Nicotine use    Small bowel obstruction (HCC)    COPD (chronic obstructive pulmonary disease) (HCC)    -cont NG tube to suction today   -cont daily mineral oil via NG tube   -ambulate in hallway 4 times per day   -abd xray in AM   -if no improvement tomorrow, will do CT of abd/pel with oral contrast and possible surgery on Friday    JIN Schmidt  08/17/22 09:07 EDT      Electronically signed by JIN Schmidt, 08/17/22, 9:07 AM EDT.

## 2022-08-18 ENCOUNTER — APPOINTMENT (OUTPATIENT)
Dept: GENERAL RADIOLOGY | Facility: HOSPITAL | Age: 72
End: 2022-08-18

## 2022-08-18 ENCOUNTER — APPOINTMENT (OUTPATIENT)
Dept: CT IMAGING | Facility: HOSPITAL | Age: 72
End: 2022-08-18

## 2022-08-18 LAB
ANION GAP SERPL CALCULATED.3IONS-SCNC: 10 MMOL/L (ref 5–15)
BASOPHILS # BLD AUTO: 0.02 10*3/MM3 (ref 0–0.2)
BASOPHILS NFR BLD AUTO: 0.3 % (ref 0–1.5)
BUN SERPL-MCNC: 5 MG/DL (ref 8–23)
BUN/CREAT SERPL: 7.5 (ref 7–25)
CALCIUM SPEC-SCNC: 8.8 MG/DL (ref 8.6–10.5)
CHLORIDE SERPL-SCNC: 101 MMOL/L (ref 98–107)
CO2 SERPL-SCNC: 28 MMOL/L (ref 22–29)
CREAT SERPL-MCNC: 0.67 MG/DL (ref 0.57–1)
DEPRECATED RDW RBC AUTO: 43.3 FL (ref 37–54)
EGFRCR SERPLBLD CKD-EPI 2021: 94.7 ML/MIN/1.73
EOSINOPHIL # BLD AUTO: 0.09 10*3/MM3 (ref 0–0.4)
EOSINOPHIL NFR BLD AUTO: 1.2 % (ref 0.3–6.2)
ERYTHROCYTE [DISTWIDTH] IN BLOOD BY AUTOMATED COUNT: 11.9 % (ref 12.3–15.4)
GLUCOSE SERPL-MCNC: 141 MG/DL (ref 65–99)
HCT VFR BLD AUTO: 36.1 % (ref 34–46.6)
HGB BLD-MCNC: 12.4 G/DL (ref 12–15.9)
IMM GRANULOCYTES # BLD AUTO: 0.05 10*3/MM3 (ref 0–0.05)
IMM GRANULOCYTES NFR BLD AUTO: 0.6 % (ref 0–0.5)
LYMPHOCYTES # BLD AUTO: 0.75 10*3/MM3 (ref 0.7–3.1)
LYMPHOCYTES NFR BLD AUTO: 9.7 % (ref 19.6–45.3)
MAGNESIUM SERPL-MCNC: 1.8 MG/DL (ref 1.6–2.4)
MCH RBC QN AUTO: 33.8 PG (ref 26.6–33)
MCHC RBC AUTO-ENTMCNC: 34.3 G/DL (ref 31.5–35.7)
MCV RBC AUTO: 98.4 FL (ref 79–97)
MONOCYTES # BLD AUTO: 0.93 10*3/MM3 (ref 0.1–0.9)
MONOCYTES NFR BLD AUTO: 12.1 % (ref 5–12)
NEUTROPHILS NFR BLD AUTO: 5.86 10*3/MM3 (ref 1.7–7)
NEUTROPHILS NFR BLD AUTO: 76.1 % (ref 42.7–76)
NRBC BLD AUTO-RTO: 0 /100 WBC (ref 0–0.2)
PLATELET # BLD AUTO: 304 10*3/MM3 (ref 140–450)
PMV BLD AUTO: 8.8 FL (ref 6–12)
POTASSIUM SERPL-SCNC: 3.6 MMOL/L (ref 3.5–5.2)
RBC # BLD AUTO: 3.67 10*6/MM3 (ref 3.77–5.28)
SODIUM SERPL-SCNC: 139 MMOL/L (ref 136–145)
WBC NRBC COR # BLD: 7.7 10*3/MM3 (ref 3.4–10.8)

## 2022-08-18 PROCEDURE — 94799 UNLISTED PULMONARY SVC/PX: CPT

## 2022-08-18 PROCEDURE — 99232 SBSQ HOSP IP/OBS MODERATE 35: CPT | Performed by: NURSE PRACTITIONER

## 2022-08-18 PROCEDURE — 25010000002 HYDROMORPHONE 1 MG/ML SOLUTION: Performed by: STUDENT IN AN ORGANIZED HEALTH CARE EDUCATION/TRAINING PROGRAM

## 2022-08-18 PROCEDURE — 0 IOPAMIDOL PER 1 ML: Performed by: STUDENT IN AN ORGANIZED HEALTH CARE EDUCATION/TRAINING PROGRAM

## 2022-08-18 PROCEDURE — 71045 X-RAY EXAM CHEST 1 VIEW: CPT

## 2022-08-18 PROCEDURE — 74177 CT ABD & PELVIS W/CONTRAST: CPT

## 2022-08-18 PROCEDURE — 25010000002 HYDROMORPHONE 1 MG/ML SOLUTION: Performed by: INTERNAL MEDICINE

## 2022-08-18 PROCEDURE — 25010000002 ONDANSETRON PER 1 MG: Performed by: STUDENT IN AN ORGANIZED HEALTH CARE EDUCATION/TRAINING PROGRAM

## 2022-08-18 PROCEDURE — 80048 BASIC METABOLIC PNL TOTAL CA: CPT | Performed by: NURSE PRACTITIONER

## 2022-08-18 PROCEDURE — 85025 COMPLETE CBC W/AUTO DIFF WBC: CPT | Performed by: NURSE PRACTITIONER

## 2022-08-18 PROCEDURE — 25010000002 ENOXAPARIN PER 10 MG: Performed by: STUDENT IN AN ORGANIZED HEALTH CARE EDUCATION/TRAINING PROGRAM

## 2022-08-18 PROCEDURE — 83735 ASSAY OF MAGNESIUM: CPT | Performed by: NURSE PRACTITIONER

## 2022-08-18 RX ORDER — POTASSIUM CHLORIDE 7.45 MG/ML
10 INJECTION INTRAVENOUS
Status: DISCONTINUED | OUTPATIENT
Start: 2022-08-18 | End: 2022-08-18 | Stop reason: ALTCHOICE

## 2022-08-18 RX ADMIN — ONDANSETRON 4 MG: 2 INJECTION INTRAMUSCULAR; INTRAVENOUS at 04:18

## 2022-08-18 RX ADMIN — MINERAL OIL 45 ML: 1000 SOLUTION ORAL at 09:15

## 2022-08-18 RX ADMIN — Medication 10 ML: at 21:26

## 2022-08-18 RX ADMIN — Medication 10 ML: at 09:15

## 2022-08-18 RX ADMIN — HYDROMORPHONE HYDROCHLORIDE 0.5 MG: 1 INJECTION, SOLUTION INTRAMUSCULAR; INTRAVENOUS; SUBCUTANEOUS at 04:18

## 2022-08-18 RX ADMIN — FAMOTIDINE 20 MG: 10 INJECTION INTRAVENOUS at 21:26

## 2022-08-18 RX ADMIN — ONDANSETRON 4 MG: 2 INJECTION INTRAMUSCULAR; INTRAVENOUS at 19:17

## 2022-08-18 RX ADMIN — FAMOTIDINE 20 MG: 10 INJECTION INTRAVENOUS at 09:15

## 2022-08-18 RX ADMIN — IOPAMIDOL 100 ML: 755 INJECTION, SOLUTION INTRAVENOUS at 21:03

## 2022-08-18 RX ADMIN — DEXTROSE MONOHYDRATE, SODIUM CHLORIDE, AND POTASSIUM CHLORIDE 100 ML/HR: 50; 9; 1.49 INJECTION, SOLUTION INTRAVENOUS at 19:01

## 2022-08-18 RX ADMIN — ONDANSETRON 4 MG: 2 INJECTION INTRAMUSCULAR; INTRAVENOUS at 12:43

## 2022-08-18 RX ADMIN — ENOXAPARIN SODIUM 30 MG: 100 INJECTION SUBCUTANEOUS at 12:43

## 2022-08-18 RX ADMIN — DEXTROSE MONOHYDRATE, SODIUM CHLORIDE, AND POTASSIUM CHLORIDE 100 ML/HR: 50; 9; 1.49 INJECTION, SOLUTION INTRAVENOUS at 09:14

## 2022-08-18 RX ADMIN — HYDROMORPHONE HYDROCHLORIDE 0.5 MG: 1 INJECTION, SOLUTION INTRAMUSCULAR; INTRAVENOUS; SUBCUTANEOUS at 14:24

## 2022-08-18 NOTE — PLAN OF CARE
Goal Outcome Evaluation:  Plan of Care Reviewed With: patient, family        Progress: no change  Outcome Evaluation: Patient remains alert and oriented x4. Medicated with PRN pain and nausea medication per MAR. Oral contrast not tolerated, MD aware. CT is still to be performed. Chest XR ordered to verify placement since measurements were altered after the N/V. Patient continues to have NG tube connected to intermittent LWS, tolerating well at this time.

## 2022-08-18 NOTE — PROGRESS NOTES
PROGRESS NOTE     Patient Name:  Fanta Hawkins  YOB: 1950  [id number]   LOS: 7 days   * No surgery found *  Patient Care Team:  Eduard Minaya MD as PCP - General (Internal Medicine)      Chief complaint: abd pain    Subjective     Interval History:  VSS, afebrile. +flatus.  No BM yesterday.  Walked in hallway yesterday.  Reports no abdominal pain.  Is hungry.        Review of Systems:    All complete review of systems was performed and all are negative except what is documented in the HPI.       Objective     Vital Signs  Temp:  [97.3 °F (36.3 °C)-98.6 °F (37 °C)] 97.5 °F (36.4 °C)  Heart Rate:  [75-88] 84  Resp:  [16-20] 18  BP: (144-171)/(82-90) 163/86    Physical Exam:     General Appearance:   Alert, cooperative, in no acute distress   Head:   Normocephalic, without obvious abnormality, atraumatic   Eyes:            Lids and lashes normal, conjunctivae and sclerae normal, no     Icterus    Ears:   Ears appear intact with no abnormalities noted   Throat:  No oral lesions, oral mucosa moist  Neck: supple, trachea midline    Lungs:    Respirations regular, even and unlabored    Heart:   Regular rhythm and normal rate   Chest Wall:   No abnormalities observed   Abdomen:    no masses, no organomegaly, soft non-tender, non-distended, no guarding   Extremities:  Moves all extremities well, no edema, no cyanosis, no               redness   Skin:  No bleeding, bruising or rash   Neurologic:  A/Ox3 with no deficits       Results Review:     I reviewed the patient's new clinical results : no new clinical results.     LABS:  Lab Results (last 72 hours)     Procedure Component Value Units Date/Time    Basic Metabolic Panel [[id number]]  (Abnormal) Collected: 08/16/22 0508    Specimen: Blood Updated: 08/16/22 0603     Glucose 71 mg/dL      BUN 18 mg/dL      Creatinine 0.67 mg/dL      Sodium 136 mmol/L      Potassium 3.3 mmol/L      Chloride 97 mmol/L      CO2 23.8 mmol/L      Calcium 8.5 mg/dL       BUN/Creatinine Ratio 26.9     Anion Gap 15.2 mmol/L      eGFR 94.7 mL/min/1.73      Comment: National Kidney Foundation and American Society of Nephrology (ASN) Task Force recommended calculation based on the Chronic Kidney Disease Epidemiology Collaboration (CKD-EPI) equation refit without adjustment for race.       Narrative:      GFR Normal >60  Chronic Kidney Disease <60  Kidney Failure <15      CBC & Differential [[id number]]  (Abnormal) Collected: 08/16/22 0508    Specimen: Blood Updated: 08/16/22 0555    Narrative:      The following orders were created for panel order CBC & Differential.  Procedure                               Abnormality         Status                     ---------                               -----------         ------                     CBC Auto Differential[[id number]]        Abnormal            Final result                 Please view results for these tests on the individual orders.    CBC Auto Differential [[id number]]  (Abnormal) Collected: 08/16/22 0508    Specimen: Blood Updated: 08/16/22 0555     WBC 6.96 10*3/mm3      RBC 3.72 10*6/mm3      Hemoglobin 12.4 g/dL      Hematocrit 36.3 %      MCV 97.6 fL      MCH 33.3 pg      MCHC 34.2 g/dL      RDW 11.9 %      RDW-SD 42.3 fl      MPV 9.2 fL      Platelets 258 10*3/mm3      Neutrophil % 74.8 %      Lymphocyte % 11.1 %      Monocyte % 12.6 %      Eosinophil % 0.9 %      Basophil % 0.3 %      Immature Grans % 0.3 %      Neutrophils, Absolute 5.21 10*3/mm3      Lymphocytes, Absolute 0.77 10*3/mm3      Monocytes, Absolute 0.88 10*3/mm3      Eosinophils, Absolute 0.06 10*3/mm3      Basophils, Absolute 0.02 10*3/mm3      Immature Grans, Absolute 0.02 10*3/mm3      nRBC 0.0 /100 WBC           IMAGING:  Imaging Results (Last 72 Hours)     Procedure Component Value Units Date/Time    XR Abdomen Flat & Upright [[id number]] Collected: 08/17/22 0603     Updated: 08/17/22 0606    Narrative:      PROCEDURE: XR ABDOMEN FLAT AND UPRIGHT      COMPARISON: [facility], CR, XR ABDOMEN FLAT AND UPRIGHT, 8/16/2022, 4:44.     INDICATIONS: SMALL BOWEL OBSTRUCTION     FINDINGS:   There are multiple distended small bowel loops, unchanged from the prior study.  Gastric suction   tube terminates in the stomach.  Stable basilar lung opacities.  No pneumatosis intestinalis or   evidence of pneumoperitoneum.  Soft tissue anchors noted in the superior pubic rami and posterior   fusion hardware noted in the lower lumbar spine.       Impression:       Stable small-bowel distention concerning for ongoing obstruction.            MELODIE NGUYEN MD         Electronically Signed and Approved By: MELODIE NGUYEN MD on 8/17/2022 at 6:03                     XR Abdomen Flat & Upright [[id number]] Collected: 08/16/22 0540     Updated: 08/16/22 0543    Narrative:      PROCEDURE: XR ABDOMEN FLAT AND UPRIGHT     COMPARISON: [facility], CR, XR ABDOMEN KUB, 8/15/2022, 9:08.  [facility], CT, CT ABDOMEN PELVIS W CONTRAST, 8/11/2022, 8:04.  [facility], CR, XR   ABDOMEN KUB, 8/15/2022, 15:28.     INDICATIONS: bowel obstruction     FINDINGS:   Stable gastric suction tube terminating in the stomach.  There is evidence of prior L4-L5 posterior   lumbar fusion.  There are moderate thoracolumbar degenerative changes.  There is persistent   bibasilar lung opacity.  There is persistent relatively diffuse mild small bowel distention.  No   pneumatosis intestinalis or pneumoperitoneum.  There are multiple phleboliths within the pelvis and   there are tendon anchors in the superior pubic rami.  There are prominent costochondral   calcifications.  No definite pathologic abdominal calcification.       Impression:         1. Persistent small bowel distention concerning for ongoing obstruction.  2. Gastric suction tube terminates in the stomach.            MELODIE NGUYEN MD         Electronically Signed and Approved By: MELODIE NGUYEN MD on  8/16/2022 at 5:40                     XR Abdomen KUB [[id number]] Collected: 08/15/22 1634     Updated: 08/15/22 1637    Narrative:      PROCEDURE: XR ABDOMEN KUB     COMPARISON: [facility], CR, XR ABDOMEN KUB, 8/15/2022, 9:08.     INDICATIONS: NG tube placement verification     FINDINGS:   An NG tube has its tip in the stomach.       Impression:       NG tube with tip in stomach.            SHARDA MANUEL MD         Electronically Signed and Approved By: SHARDA MANUEL MD on 8/15/2022 at 16:34                     XR Abdomen KUB [[id number]] Collected: 08/15/22 1007     Updated: 08/15/22 1010    Narrative:      PROCEDURE: XR ABDOMEN KUB     COMPARISON: [facility], CR, XR ABDOMEN KUB, 8/11/2022, 11:12.  [facility], CR, XR ABDOMEN KUB, 8/11/2022, 17:55.  [facility], CT, CT ABDOMEN PELVIS W   CONTRAST, 8/11/2022, 8:04.  [facility], CR, XR ABDOMEN KUB, 8/11/2022, 18:44.     INDICATIONS: small bowel obstruction     FINDINGS:   Single supine view of the abdomen performed.  Postoperative changes lumbar spine.  There are   postoperative changes project over the pelvis and lower lumbar spine.  Multiple pelvic   calcifications reflect phleboliths.  Paucity of large bowel gas.  Persistently dilated gas-filled   small bowel loops measure up to 3.6 centimeters.  This may be greater than previous CT scan from   8/11/2022.  The upper abdomen was not imaged.        Impression:         1. Dilated gas-filled small bowel loops with relative paucity of large bowel gas most concerning   for small bowel obstruction.  Small bowel measures up to 3.6 centimeters may be slightly greater   than prior CT scan 8/11/2022.  2. The upper abdomen and NG tube were not imaged.            LEEANN GRIFFIN MD         Electronically Signed and Approved By: LEEANN GRIFFIN MD on 8/15/2022 at 10:07                           Medications:    Current Facility-Administered  Medications:   •  acetaminophen (TYLENOL) tablet 650 mg, 650 mg, Nasogastric, Q4H PRN **OR** acetaminophen (TYLENOL) 160 MG/5ML solution 650 mg, 650 mg, Nasogastric, Q4H PRN **OR** acetaminophen (TYLENOL) suppository 650 mg, 650 mg, Rectal, Q4H PRN, Mitchel Vicente MD  •  ALPRAZolam (XANAX) tablet 0.5 mg, 0.5 mg, Nasogastric, Q8H PRN, Mitchel Vicente MD  •  aluminum-magnesium hydroxide-simethicone (MAALOX MAX) 400-400-40 MG/5ML suspension 15 mL, 15 mL, Nasogastric, Q6H PRN, Mitchel Vicente MD  •  dextrose 5 % and sodium chloride 0.9 % with KCl 20 mEq/L infusion, 100 mL/hr, Intravenous, Continuous, Ruchi Villarreal APRN, Last Rate: 100 mL/hr at 08/17/22 2309, 100 mL/hr at 08/17/22 2309  •  Enoxaparin Sodium (LOVENOX) syringe 30 mg, 30 mg, Subcutaneous, Q24H, Mitchel Vicente MD, 30 mg at 08/17/22 1200  •  famotidine (PEPCID) injection 20 mg, 20 mg, Intravenous, Q12H, Ruchi Villarreal APRN, 20 mg at 08/17/22 1941  •  HYDROcodone-acetaminophen (NORCO)  MG per tablet 1 tablet, 1 tablet, Nasogastric, Q4H PRN, Mitchel Vicente MD  •  HYDROcodone-acetaminophen (NORCO) 5-325 MG per tablet 1 tablet, 1 tablet, Nasogastric, Q4H PRN, Mitchel Vicente MD  •  HYDROmorphone (DILAUDID) injection 0.5 mg, 0.5 mg, Intravenous, Q2H PRN, 0.5 mg at 08/18/22 9418 **AND** naloxone (NARCAN) injection 0.4 mg, 0.4 mg, Intravenous, Q5 Min PRN, Vish Vieira MD  •  ipratropium-albuterol (DUO-NEB) nebulizer solution 3 mL, 3 mL, Nebulization, Q4H PRN, Vish Vieira MD  •  melatonin tablet 5 mg, 5 mg, Nasogastric, Nightly PRN, Mitchel Vicente MD  •  mineral oil liquid 45 mL, 45 mL, Oral, Daily, Maria C Aranda, APRN, 45 mL at 08/17/22 0954  •  ondansetron (ZOFRAN) injection 4 mg, 4 mg, Intravenous, Q4H PRN, Mitchel Vicente MD, 4 mg at 08/18/22 0418  •  phenol (CHLORASEPTIC) 1.4 % liquid 1 spray, 1 spray, Mouth/Throat, Q2H PRN, Maria C Aranda, APRN, 1 spray at 08/17/22 1441  •   potassium chloride 10 mEq in 100 mL IVPB, 10 mEq, Intravenous, Q1H, Mitchel Vicente MD  •  sodium chloride 0.9 % flush 10 mL, 10 mL, Intravenous, PRN, Mark aDvis, DO  •  sodium chloride 0.9 % flush 10 mL, 10 mL, Intravenous, PRN, Vish Vieira MD  •  sodium chloride 0.9 % flush 10 mL, 10 mL, Intravenous, Q12H, Vish Vieira MD, 10 mL at 08/17/22 0954  •  sodium chloride 0.9 % infusion 40 mL, 40 mL, Intravenous, PRN, Vish Vieira MD    Assessment & Plan       Nicotine use    Small bowel obstruction (HCC)    COPD (chronic obstructive pulmonary disease) (HCC)   -CT with oral contrast and delayed imaging today to determine if contrast is moving thru colon.  If it does not pass thru then will plan for exploratory laparotomy tomorrow   -CBC and BMP today and magnesium level to recheck patients hypokalemia      JIN Schmidt  08/18/22  08:15 EDT    Electronically signed by JIN Schmidt, 08/18/22, 8:15 AM EDT.

## 2022-08-18 NOTE — PROGRESS NOTES
[facility]     Progress Note    Patient Name: Fanta Hawkins  : 1950  MRN: [id number]  Primary Care Physician:  Eduard Mniaya MD  Date of admission: 2022    Subjective    Patient complaining of nausea and vomiting  Still no bowel movements. No gas  Ng in place again  On going obstruction but abdomen soft        Facility-Administered Medications as of 2022   Medication Dose Route Frequency Provider Last Rate Last Admin   • acetaminophen (TYLENOL) tablet 650 mg  650 mg Nasogastric Q4H PRN Mitchel Vicente MD        Or   • acetaminophen (TYLENOL) 160 MG/5ML solution 650 mg  650 mg Nasogastric Q4H PRN Mitchel Vicente MD        Or   • acetaminophen (TYLENOL) suppository 650 mg  650 mg Rectal Q4H PRN Mitchel Vicente MD       • ALPRAZolam (XANAX) tablet 0.5 mg  0.5 mg Nasogastric Q8H PRN Mitchel Vicente MD       • aluminum-magnesium hydroxide-simethicone (MAALOX MAX) 400-400-40 MG/5ML suspension 15 mL  15 mL Nasogastric Q6H PRN Mitchel Vicente MD       • [COMPLETED] Benzocaine 20 % aerosol 1 spray  1 spray Mouth/Throat Once Maria C Aranda APRN   1 spray at 08/15/22 1424   • dextrose 5 % and sodium chloride 0.9 % with KCl 20 mEq/L infusion  100 mL/hr Intravenous Continuous Ruchi Villarreal APRN 100 mL/hr at 22 2309 100 mL/hr at 22 2309   • Enoxaparin Sodium (LOVENOX) syringe 30 mg  30 mg Subcutaneous Q24H Mitchel Vicente MD   30 mg at 22 1200   • [COMPLETED] famotidine (PEPCID) injection 20 mg  20 mg Intravenous Once Mark Davis DO   20 mg at 22 0736   • famotidine (PEPCID) injection 20 mg  20 mg Intravenous Q12H Ruchi Villarreal APRN   20 mg at 22 1941   • [COMPLETED] fentaNYL citrate (PF) (SUBLIMAZE) injection 50 mcg  50 mcg Intravenous Once Mark Davis, DO   50 mcg at 22 0736   • HYDROcodone-acetaminophen (NORCO)  MG per tablet 1 tablet  1 tablet Nasogastric Q4H PRN Mitchel Vicente MD        • HYDROcodone-acetaminophen (NORCO) 5-325 MG per tablet 1 tablet  1 tablet Nasogastric Q4H PRN Mitchel Vicente MD       • HYDROmorphone (DILAUDID) injection 0.5 mg  0.5 mg Intravenous Q2H PRN Vish Vieira MD   0.5 mg at 22 0418    And   • naloxone (NARCAN) injection 0.4 mg  0.4 mg Intravenous Q5 Min PRN Vish Vieira MD       • [COMPLETED] iopamidol (ISOVUE-370) 76 % injection 100 mL  100 mL Intravenous Once in imaging Mark Davis DO   100 mL at 22 0812   • ipratropium-albuterol (DUO-NEB) nebulizer solution 3 mL  3 mL Nebulization Q4H PRN Vish Vieira MD       • [COMPLETED] lactulose (CHRONULAC) 10 GM/15ML solution 30 g  30 g Oral Once Vish Vieira MD   30 g at 22 0906   • melatonin tablet 5 mg  5 mg Nasogastric Nightly PRN Mitchel Vicente MD       • [COMPLETED] metoclopramide (REGLAN) injection 10 mg  10 mg Intravenous Q6H Mitchel Vicente MD   10 mg at 22 0848   • mineral oil liquid 45 mL  45 mL Oral Daily Maria C Aranda APRN   45 mL at 22 0954   • [COMPLETED] ondansetron (ZOFRAN) injection 4 mg  4 mg Intravenous Once Africa Donaldson MD   4 mg at 22 0622   • [COMPLETED] ondansetron (ZOFRAN) injection 4 mg  4 mg Intravenous Once Mark Davis DO   4 mg at 22 0736   • [COMPLETED] ondansetron (ZOFRAN) injection 4 mg  4 mg Intravenous Once Mark Davis DO   4 mg at 22 0935   • ondansetron (ZOFRAN) injection 4 mg  4 mg Intravenous Q4H PRN Mitchel Vicente MD   4 mg at 22 0418   • phenol (CHLORASEPTIC) 1.4 % liquid 1 spray  1 spray Mouth/Throat Q2H PRN Maria C Aranda APRN   1 spray at 22 1441   • [COMPLETED] potassium chloride (MICRO-K) CR capsule 40 mEq  40 mEq Oral Once Vish Vieira MD   40 mEq at 22 0906   • [] potassium chloride 20 mEq in 50 mL IVPB  20 mEq Intravenous Q1H Vish Vieira MD 50 mL/hr at 22 1145 20 mEq at 22 1145   • [COMPLETED] sodium  chloride 0.9 % bolus 1,000 mL  1,000 mL Intravenous Once Mark Davis DO   Stopped at 08/11/22 0806   • sodium chloride 0.9 % flush 10 mL  10 mL Intravenous PRN Mark Davis DO       • sodium chloride 0.9 % flush 10 mL  10 mL Intravenous PRN Vish Vieira MD       • sodium chloride 0.9 % flush 10 mL  10 mL Intravenous Q12H Vish Vieira MD   10 mL at 08/17/22 0954   • sodium chloride 0.9 % infusion 40 mL  40 mL Intravenous PRN Vish Vieira MD              Review of Systems  Constitutional:        Weakness tiredness fatigue  Eyes:                       No blurry vision, eye discharge, eye irritation, eye pain  HEENT:                   No acute hair loss, earache and discharge, nasal congestion or discharge, sore throat, postnasal drip  Respiratory:           No shortness of breath coughing sputum production wheezing hemoptysis pleuritic chest pain  Cardiovascular:     No chest pain, orthopnea, PND, dizziness, palpitation, lower extremity edema  Gastrointestinal:   No nausea vomiting diarrhea abdominal pain constipation  Genitourinary:       No urinary incontinence, hesitancy, frequency, urgency, dysuria  Hematologic:         No bruising, bleeding, pallor, lymphadenopathy  Endocrine:            No coldness, hot flashes, polyuria, abnormal hair growth  Musculoskeletal:    No body pains, aches, arthritic pains, muscle pain ,muscle wasting  Psychiatric:          No low or high mood, anxiety, hallucinations, delusions  Skin.                      No rash, ulcers, bruising, itching  Neurological:        No confusion, headache, focal weakness, numbness, dysphasia    Objective   Objective     Vitals:   Temp:  [97.3 °F (36.3 °C)-98.6 °F (37 °C)] 97.5 °F (36.4 °C)  Heart Rate:  [75-88] 84  Resp:  [16-20] 18  BP: (144-171)/(82-90) 163/86  Physical Exam    Constitutional: Awake, alert responsive, conversant, no obvious distress              Psychiatric:  Appropriate affect,  cooperative   Neurologic:  Awake alert ,oriented x 3, strength symmetric in all extremities, Cranial Nerves grossly intact to confrontation, speech clear   Eyes:   PERRLA, sclerae anicteric, no conjunctival injection   HEENT:  Moist mucous membranes, no nasal or eye discharge, no throat congestion   Neck:   Supple, no thyromegaly, no lymphadenopathy, trachea midline, no elevated JVD   Respiratory:  Clear to auscultation bilaterally, nonlabored respirations    Cardiovascular: RRR, no murmurs, rubs, or gallops, palpable pedal pulses bilaterally, No bilateral ankle edema   Gastrointestinal: _ve bowel sounds, soft, nontender, nondistended, no organomegaly   Musculoskeletal:  No clubbing or cyanosis to extremities,muscle wasting, joint swelling, muscle weakness             Skin:                      No rashes, bruising, skin ulcers, petechiae or ecchymosis    Result Review    Result Review:  I have personally reviewed the results from the time of this admission to 8/18/2022 08:12 EDT and agree with these findings:  [x]  Laboratory  [x]  Microbiology  [x]  Radiology  []  EKG/Telemetry   []  Cardiology/Vascular   []  Pathology  []  Old records  []  Other:    Assessment & Plan   Assessment / Plan       Active Hospital Problems:  Active Hospital Problems    Diagnosis    • COPD (chronic obstructive pulmonary disease) (HCC)    • Small bowel obstruction (HCC)    • Nicotine use     71 y.o. female With past medical history significant for COPD, gerd, constipation, osteoarthritis came to the emergency room complaining of nausea vomiting abdominal pain,CT of the abdomen and pelvis showed small bowel obstruction. Has prior hx of abdominal surgery and bowel obstruction. NG placed and decompressed with improvement in abdominal pain and removed and again placed 8/16 due to no bowel movements and surgery consulted with aggressive bowel regiment and possible surgery    Plan:   Continue ng suction  Bowel regiment as ordered by surgery.  Xray noted with ongoing distention. Plan for possible ct with oral contrast and possible surgery   d5 water with kcl. Bryson give additional runs of K   Avoid narcotic pain medicines as much as possible  NPO for now  Will reassess daily to remove the ng tube

## 2022-08-18 NOTE — CONSULTS
"Nutrition Services    Patient Name: Fanta Hawkins  YOB: 1950  MRN: [id number]  Admission date: 8/11/2022      CLINICAL NUTRITION ASSESSMENT      Reason for Assessment  Identified at risk by screening criteria   H&P:    Past Medical History:   Diagnosis Date   • Arthritis    • Asthma    • COPD (chronic obstructive pulmonary disease) ([facility]) 8/15/2022   • Injury of back    • Mood disorder (HCC)         Current Problems:   Active Hospital Problems    Diagnosis    • COPD (chronic obstructive pulmonary disease) ([facility])    • Small bowel obstruction (HCC)    • Nicotine use         Nutrition/Diet History         Narrative     RD consult per nursing screening, however all nutrition screens wnl.  Pt admitted for SBO with NG for suction initially.  Per review of chart wt 115#, up drastically from wt in Nov of 97#.  RD will monitor intake to determine if any nutrition intervention required.    Update 8/18: RD assessed pt for LOS. Pt is NPO x 3 days + NG for suction and mineral oil admin. CT planned for today and expected surgery 8/19. Pt has had poor intake previously. Pt has experienced significant wt loss since admission. RD recommends nutrition support if po intake is not feasible post op. Will continue to monitor.      Anthropometrics        Current Height, Weight Height: 157.5 cm (62\")  Weight: 48.3 kg (106 lb 7.7 oz)   Current BMI Body mass index is 19.48 kg/m².       Weight Hx  Wt Readings from Last 30 Encounters:   08/18/22 0600 48.3 kg (106 lb 7.7 oz)   08/17/22 0000 47.5 kg (104 lb 12.8 oz)   08/16/22 0420 47.5 kg (104 lb 11.5 oz)   08/15/22 0010 46.3 kg (102 lb 1.2 oz)   08/14/22 0500 48.7 kg (107 lb 5.8 oz)   08/12/22 2114 45.3 kg (99 lb 13.9 oz)   08/12/22 0640 52.9 kg (116 lb 10 oz)   08/11/22 1137 52.6 kg (115 lb 15.4 oz)   08/11/22 0623 52.3 kg (115 lb 4.8 oz)   11/05/21 0839 44 kg (97 lb)   07/29/20 0000 45 kg (99 lb 4 oz)   12/20/19 0000 44.9 kg (99 lb)            Wt Change Observation 18# wt " gain since Nov. 9# loss in 1 wk (since admission).      Estimated/Assessed Needs       Energy Requirements    EST Needs (kcal/day) 0927-2251       Protein Requirements    EST Daily Needs (g/day) 53-63       Fluid Requirements     Estimated Needs (mL/day) 1406     Labs/Medications         Pertinent Labs Reviewed.   Results from last 7 days   Lab Units 08/18/22  0837 08/16/22  0508 08/15/22  0624 08/12/22  0638   SODIUM mmol/L 139 136 136 141   POTASSIUM mmol/L 3.6 3.3* 3.4* 3.8   CHLORIDE mmol/L 101 97* 99 105   CO2 mmol/L 28.0 23.8 28.6 29.0   BUN mg/dL 5* 18 20 17   CREATININE mg/dL 0.67 0.67 0.64 0.81   CALCIUM mg/dL 8.8 8.5* 8.7 9.2   BILIRUBIN mg/dL  --   --  0.4 0.6   ALK PHOS U/L  --   --  54 83   ALT (SGPT) U/L  --   --  6 8   AST (SGOT) U/L  --   --  10 14   GLUCOSE mg/dL 141* 71 89 121*     Results from last 7 days   Lab Units 08/18/22  0837   MAGNESIUM mg/dL 1.8   HEMOGLOBIN g/dL 12.4   HEMATOCRIT % 36.1       Pertinent Medications Reviewed.     Current Nutrition Orders & Evaluation of Intake       Oral Nutrition     Current PO Diet NPO Diet NPO Type: Ice Chips, Sips with Meds   Supplement No active supplement orders       Nutrition Diagnosis         Nutrition Dx Problem 1 No nutrition diagnosis at this time      Nutrition Intervention         Diet per MD     Medical Nutrition Therapy/Nutrition Education          Learner     Readiness Patient  Education not indicated at this time     Monitor/Evaluation        Monitor PO intake, Pertinent labs, Weight, GI status, Diet advancement     Nutrition Discharge Plan         To be determined     Electronically signed by:  Candy Miller RD  08/18/22 09:55 EDT

## 2022-08-19 ENCOUNTER — ANESTHESIA (OUTPATIENT)
Dept: PERIOP | Facility: HOSPITAL | Age: 72
End: 2022-08-19

## 2022-08-19 ENCOUNTER — ANESTHESIA EVENT (OUTPATIENT)
Dept: PERIOP | Facility: HOSPITAL | Age: 72
End: 2022-08-19

## 2022-08-19 PROBLEM — E43 SEVERE MALNUTRITION (HCC): Status: ACTIVE | Noted: 2022-08-19

## 2022-08-19 PROCEDURE — 25010000002 PROPOFOL 10 MG/ML EMULSION: Performed by: NURSE ANESTHETIST, CERTIFIED REGISTERED

## 2022-08-19 PROCEDURE — 25010000002 ENOXAPARIN PER 10 MG: Performed by: SURGERY

## 2022-08-19 PROCEDURE — 25010000002 HYDROMORPHONE 1 MG/ML SOLUTION: Performed by: NURSE ANESTHETIST, CERTIFIED REGISTERED

## 2022-08-19 PROCEDURE — 25010000002 DEXAMETHASONE PER 1 MG: Performed by: NURSE ANESTHETIST, CERTIFIED REGISTERED

## 2022-08-19 PROCEDURE — 0 MEPERIDINE PER 100 MG: Performed by: NURSE ANESTHETIST, CERTIFIED REGISTERED

## 2022-08-19 PROCEDURE — 25010000002 HYDROMORPHONE 1 MG/ML SOLUTION: Performed by: SURGERY

## 2022-08-19 PROCEDURE — 0DN80ZZ RELEASE SMALL INTESTINE, OPEN APPROACH: ICD-10-PCS | Performed by: SURGERY

## 2022-08-19 PROCEDURE — 44005 FREEING OF BOWEL ADHESION: CPT | Performed by: SURGERY

## 2022-08-19 PROCEDURE — 0 HYDROMORPHONE 1 MG/ML SOLUTION: Performed by: NURSE ANESTHETIST, CERTIFIED REGISTERED

## 2022-08-19 PROCEDURE — 25010000002 MIDAZOLAM PER 1 MG: Performed by: ANESTHESIOLOGY

## 2022-08-19 PROCEDURE — 99232 SBSQ HOSP IP/OBS MODERATE 35: CPT | Performed by: NURSE PRACTITIONER

## 2022-08-19 PROCEDURE — 25010000002 ONDANSETRON PER 1 MG: Performed by: STUDENT IN AN ORGANIZED HEALTH CARE EDUCATION/TRAINING PROGRAM

## 2022-08-19 PROCEDURE — 25010000002 ONDANSETRON PER 1 MG: Performed by: NURSE ANESTHETIST, CERTIFIED REGISTERED

## 2022-08-19 PROCEDURE — C1889 IMPLANT/INSERT DEVICE, NOC: HCPCS | Performed by: SURGERY

## 2022-08-19 DEVICE — LIGACLIP MCA MULTIPLE CLIP APPLIERS, 20 MEDIUM CLIPS
Type: IMPLANTABLE DEVICE | Site: ABDOMEN | Status: FUNCTIONAL
Brand: LIGACLIP

## 2022-08-19 RX ORDER — DEXAMETHASONE SODIUM PHOSPHATE 4 MG/ML
INJECTION, SOLUTION INTRA-ARTICULAR; INTRALESIONAL; INTRAMUSCULAR; INTRAVENOUS; SOFT TISSUE AS NEEDED
Status: DISCONTINUED | OUTPATIENT
Start: 2022-08-19 | End: 2022-08-19 | Stop reason: SURG

## 2022-08-19 RX ORDER — MIDAZOLAM HYDROCHLORIDE 1 MG/ML
1 INJECTION INTRAMUSCULAR; INTRAVENOUS ONCE
Status: COMPLETED | OUTPATIENT
Start: 2022-08-19 | End: 2022-08-19

## 2022-08-19 RX ORDER — SODIUM CHLORIDE 9 MG/ML
9 INJECTION, SOLUTION INTRAVENOUS CONTINUOUS PRN
Status: DISCONTINUED | OUTPATIENT
Start: 2022-08-19 | End: 2022-08-26 | Stop reason: HOSPADM

## 2022-08-19 RX ORDER — OXYCODONE HYDROCHLORIDE 5 MG/1
5 TABLET ORAL
Status: DISCONTINUED | OUTPATIENT
Start: 2022-08-19 | End: 2022-08-19 | Stop reason: HOSPADM

## 2022-08-19 RX ORDER — PROMETHAZINE HYDROCHLORIDE 25 MG/1
25 SUPPOSITORY RECTAL ONCE AS NEEDED
Status: DISCONTINUED | OUTPATIENT
Start: 2022-08-19 | End: 2022-08-19 | Stop reason: HOSPADM

## 2022-08-19 RX ORDER — PHENYLEPHRINE HCL IN 0.9% NACL 1 MG/10 ML
SYRINGE (ML) INTRAVENOUS AS NEEDED
Status: DISCONTINUED | OUTPATIENT
Start: 2022-08-19 | End: 2022-08-19 | Stop reason: SURG

## 2022-08-19 RX ORDER — SUCCINYLCHOLINE/SOD CL,ISO/PF 100 MG/5ML
SYRINGE (ML) INTRAVENOUS AS NEEDED
Status: DISCONTINUED | OUTPATIENT
Start: 2022-08-19 | End: 2022-08-19 | Stop reason: SURG

## 2022-08-19 RX ORDER — ONDANSETRON 2 MG/ML
4 INJECTION INTRAMUSCULAR; INTRAVENOUS EVERY 6 HOURS PRN
Status: DISCONTINUED | OUTPATIENT
Start: 2022-08-19 | End: 2022-08-26 | Stop reason: HOSPADM

## 2022-08-19 RX ORDER — LIDOCAINE HYDROCHLORIDE 20 MG/ML
INJECTION, SOLUTION EPIDURAL; INFILTRATION; INTRACAUDAL; PERINEURAL AS NEEDED
Status: DISCONTINUED | OUTPATIENT
Start: 2022-08-19 | End: 2022-08-19 | Stop reason: SURG

## 2022-08-19 RX ORDER — SODIUM CHLORIDE 9 MG/ML
INJECTION, SOLUTION INTRAVENOUS CONTINUOUS PRN
Status: DISCONTINUED | OUTPATIENT
Start: 2022-08-19 | End: 2022-08-19 | Stop reason: SURG

## 2022-08-19 RX ORDER — MAGNESIUM HYDROXIDE 1200 MG/15ML
LIQUID ORAL AS NEEDED
Status: DISCONTINUED | OUTPATIENT
Start: 2022-08-19 | End: 2022-08-19 | Stop reason: HOSPADM

## 2022-08-19 RX ORDER — PROMETHAZINE HYDROCHLORIDE 12.5 MG/1
25 TABLET ORAL ONCE AS NEEDED
Status: DISCONTINUED | OUTPATIENT
Start: 2022-08-19 | End: 2022-08-19 | Stop reason: HOSPADM

## 2022-08-19 RX ORDER — ROCURONIUM BROMIDE 10 MG/ML
INJECTION, SOLUTION INTRAVENOUS AS NEEDED
Status: DISCONTINUED | OUTPATIENT
Start: 2022-08-19 | End: 2022-08-19 | Stop reason: SURG

## 2022-08-19 RX ORDER — ONDANSETRON 2 MG/ML
4 INJECTION INTRAMUSCULAR; INTRAVENOUS ONCE AS NEEDED
Status: DISCONTINUED | OUTPATIENT
Start: 2022-08-19 | End: 2022-08-19 | Stop reason: HOSPADM

## 2022-08-19 RX ORDER — CLINDAMYCIN PHOSPHATE 900 MG/50ML
900 INJECTION INTRAVENOUS ONCE
Status: COMPLETED | OUTPATIENT
Start: 2022-08-19 | End: 2022-08-19

## 2022-08-19 RX ORDER — ONDANSETRON 2 MG/ML
INJECTION INTRAMUSCULAR; INTRAVENOUS AS NEEDED
Status: DISCONTINUED | OUTPATIENT
Start: 2022-08-19 | End: 2022-08-19 | Stop reason: SURG

## 2022-08-19 RX ORDER — MEPERIDINE HYDROCHLORIDE 25 MG/ML
12.5 INJECTION INTRAMUSCULAR; INTRAVENOUS; SUBCUTANEOUS
Status: DISCONTINUED | OUTPATIENT
Start: 2022-08-19 | End: 2022-08-19 | Stop reason: HOSPADM

## 2022-08-19 RX ORDER — PROPOFOL 10 MG/ML
VIAL (ML) INTRAVENOUS AS NEEDED
Status: DISCONTINUED | OUTPATIENT
Start: 2022-08-19 | End: 2022-08-19 | Stop reason: SURG

## 2022-08-19 RX ORDER — GLYCOPYRROLATE 0.2 MG/ML
0.2 INJECTION INTRAMUSCULAR; INTRAVENOUS
Status: COMPLETED | OUTPATIENT
Start: 2022-08-19 | End: 2022-08-19

## 2022-08-19 RX ADMIN — Medication 100 MG: at 09:41

## 2022-08-19 RX ADMIN — MIDAZOLAM HYDROCHLORIDE 1 MG: 2 INJECTION, SOLUTION INTRAMUSCULAR; INTRAVENOUS at 09:19

## 2022-08-19 RX ADMIN — SUGAMMADEX 200 MG: 100 INJECTION, SOLUTION INTRAVENOUS at 11:13

## 2022-08-19 RX ADMIN — HYDROMORPHONE HYDROCHLORIDE 0.5 MG: 1 INJECTION, SOLUTION INTRAMUSCULAR; INTRAVENOUS; SUBCUTANEOUS at 21:27

## 2022-08-19 RX ADMIN — HYDROMORPHONE HYDROCHLORIDE 0.5 MG: 1 INJECTION, SOLUTION INTRAMUSCULAR; INTRAVENOUS; SUBCUTANEOUS at 10:04

## 2022-08-19 RX ADMIN — Medication 100 MCG: at 10:56

## 2022-08-19 RX ADMIN — ENOXAPARIN SODIUM 30 MG: 100 INJECTION SUBCUTANEOUS at 14:05

## 2022-08-19 RX ADMIN — HYDROMORPHONE HYDROCHLORIDE 0.5 MG: 1 INJECTION, SOLUTION INTRAMUSCULAR; INTRAVENOUS; SUBCUTANEOUS at 11:31

## 2022-08-19 RX ADMIN — DEXTROSE MONOHYDRATE, SODIUM CHLORIDE, AND POTASSIUM CHLORIDE 100 ML/HR: 50; 9; 1.49 INJECTION, SOLUTION INTRAVENOUS at 04:57

## 2022-08-19 RX ADMIN — ROCURONIUM BROMIDE 10 MG: 10 INJECTION INTRAVENOUS at 10:16

## 2022-08-19 RX ADMIN — Medication 100 MCG: at 10:42

## 2022-08-19 RX ADMIN — DEXAMETHASONE SODIUM PHOSPHATE 8 MG: 4 INJECTION, SOLUTION INTRA-ARTICULAR; INTRALESIONAL; INTRAMUSCULAR; INTRAVENOUS; SOFT TISSUE at 09:47

## 2022-08-19 RX ADMIN — ONDANSETRON 4 MG: 2 INJECTION INTRAMUSCULAR; INTRAVENOUS at 11:11

## 2022-08-19 RX ADMIN — FAMOTIDINE 20 MG: 10 INJECTION INTRAVENOUS at 08:20

## 2022-08-19 RX ADMIN — FAMOTIDINE 20 MG: 10 INJECTION INTRAVENOUS at 20:37

## 2022-08-19 RX ADMIN — CLINDAMYCIN IN 5 PERCENT DEXTROSE 900 MG: 18 INJECTION, SOLUTION INTRAVENOUS at 09:35

## 2022-08-19 RX ADMIN — Medication 100 MCG: at 10:46

## 2022-08-19 RX ADMIN — Medication 100 MCG: at 09:56

## 2022-08-19 RX ADMIN — Medication 100 MCG: at 10:16

## 2022-08-19 RX ADMIN — MEPERIDINE HYDROCHLORIDE 12.5 MG: 25 INJECTION INTRAMUSCULAR; INTRAVENOUS; SUBCUTANEOUS at 11:54

## 2022-08-19 RX ADMIN — SODIUM CHLORIDE: 9 INJECTION, SOLUTION INTRAVENOUS at 09:31

## 2022-08-19 RX ADMIN — ROCURONIUM BROMIDE 10 MG: 10 INJECTION INTRAVENOUS at 10:45

## 2022-08-19 RX ADMIN — LIDOCAINE HYDROCHLORIDE 50 MG: 20 INJECTION, SOLUTION EPIDURAL; INFILTRATION; INTRACAUDAL; PERINEURAL at 09:40

## 2022-08-19 RX ADMIN — Medication 100 MCG: at 10:31

## 2022-08-19 RX ADMIN — GLYCOPYRROLATE 0.2 MG: 0.2 INJECTION INTRAMUSCULAR; INTRAVENOUS at 09:20

## 2022-08-19 RX ADMIN — Medication 10 ML: at 08:19

## 2022-08-19 RX ADMIN — Medication 10 ML: at 20:38

## 2022-08-19 RX ADMIN — ROCURONIUM BROMIDE 30 MG: 10 INJECTION INTRAVENOUS at 09:41

## 2022-08-19 RX ADMIN — DEXTROSE MONOHYDRATE, SODIUM CHLORIDE, AND POTASSIUM CHLORIDE 100 ML/HR: 50; 9; 1.49 INJECTION, SOLUTION INTRAVENOUS at 23:36

## 2022-08-19 RX ADMIN — SODIUM CHLORIDE: 9 INJECTION, SOLUTION INTRAVENOUS at 10:44

## 2022-08-19 RX ADMIN — DEXTROSE MONOHYDRATE, SODIUM CHLORIDE, AND POTASSIUM CHLORIDE 100 ML/HR: 50; 9; 1.49 INJECTION, SOLUTION INTRAVENOUS at 13:09

## 2022-08-19 RX ADMIN — Medication 100 MCG: at 10:21

## 2022-08-19 RX ADMIN — HYDROMORPHONE HYDROCHLORIDE 0.5 MG: 1 INJECTION, SOLUTION INTRAMUSCULAR; INTRAVENOUS; SUBCUTANEOUS at 10:16

## 2022-08-19 RX ADMIN — HYDROMORPHONE HYDROCHLORIDE 0.5 MG: 1 INJECTION, SOLUTION INTRAMUSCULAR; INTRAVENOUS; SUBCUTANEOUS at 15:34

## 2022-08-19 RX ADMIN — PROPOFOL 150 MG: 10 INJECTION, EMULSION INTRAVENOUS at 09:40

## 2022-08-19 RX ADMIN — ONDANSETRON 4 MG: 2 INJECTION INTRAMUSCULAR; INTRAVENOUS at 04:06

## 2022-08-19 RX ADMIN — Medication 100 MCG: at 10:06

## 2022-08-19 RX ADMIN — Medication 100 MCG: at 10:27

## 2022-08-19 NOTE — PROGRESS NOTES
[facility]     Progress Note    Patient Name: Fanta Hawkins  : 1950  MRN: [id number]  Primary Care Physician:  Eduard Minaya MD  Date of admission: 2022  6:15 AM       Subjective     She underwent surgery this a.m. for small bowel obstruction.  Had exploratory laparotomy with extensive lysis of adhesions.  She arouses easily and is able to answer questions appropriately.  She continues to be very weak.    Review of Systems:    Review of Systems   Difficult to obtain due to drowsiness    Objective   Objective     Vitals:   Vitals:    22 1145 22 1200 22 1215 22 1302   BP: 167/100 155/99 138/86 138/82   BP Location:       Patient Position:    Lying   Pulse: 102 110 106 101   Resp: 16 17 18 16   Temp:    97.5 °F (36.4 °C)   TempSrc:    Axillary   SpO2: 97% 93% 92% 94%   Weight:       Height:              Physical Exam:  Vitals and nursing note reviewed.     Constitutional:    Drowsy, cooperative, responsive, and conversant.  No acute distress.     HENT:      Normocephalic and atraumatic, no nasal congestion, discharge, mucous membranes are moist, no OP erythema  Eyes:      PERRLA, no scleral icterus, no conjunctival injection, no eye discharge  Neck:     Supple, no thyromegaly, no lymphadenopathy, trachea midline, no elevated JVD  Cardiovascular:      RRR, no murmurs, rubs or gallops. Palpable pedal pulses bilaterally. No BLE edema  Pulmonary:      CTAB. Pulmonary effort is normal and unlabored. No respiratory distress.    Abdominal:      Abdomen soft, nontender, mildly distended, no organomegaly  Musculoskeletal:         No tenderness or joint swelling.  Generalized muscle wasting and weakness .  Skin:     Warm and dry, cap refill less than 3 seconds. No clubbing, cyanosis, jaundice, erythema, rashes or ecchymosis.   Neurological:      Ox2, speech clear, no focal deficit, strength equal bilaterally in all extremities, cranial nerves grossly intact  Psychiatric:          Mood and affect normal.  Behavior normal.         Result Review    Result Review:  I have personally reviewed the results from the time of this admission to 9/20/2021 18:13 EDT and agree with these findings:  []  Laboratory  []  Microbiology  []  Radiology  []  EKG/Telemetry   []  Cardiology/Vascular   []  Pathology  []  Old records  []  Other:     Assessment/Plan   Assessment / Plan       Active Hospital Problems:  Active Hospital Problems    Diagnosis    • Severe malnutrition (HCC)    • COPD (chronic obstructive pulmonary disease) (HCC)    • Small bowel obstruction (HCC)    • Nicotine use          Plan:   Continue postop surgical care.  CMP, CBC, Phos, Mg in am  If continues to lose weight may require PPN, monitor closely  Pain management     Electronically signed by JIN Eduardo, 08/19/22, 2:06 PM EDT.

## 2022-08-19 NOTE — ANESTHESIA PREPROCEDURE EVALUATION
Anesthesia Evaluation     Patient summary reviewed and Nursing notes reviewed   no history of anesthetic complications:  NPO Solid Status: > 8 hours  NPO Liquid Status: > 2 hours           Airway   Mallampati: I  TM distance: >3 FB  Neck ROM: full  No difficulty expected and Anterior  Dental    (+) partials    Pulmonary - normal exam    breath sounds clear to auscultation  (+) a smoker Current Abstained day of surgery, COPD mild, asthma,shortness of breath,   Cardiovascular - negative cardio ROS and normal exam  Exercise tolerance: good (4-7 METS)    ECG reviewed  Rhythm: regular  Rate: normal        Neuro/Psych  (+) psychiatric history,    GI/Hepatic/Renal/Endo - negative ROS     ROS Comment: Bowel obstruction; N/V    Musculoskeletal     (+) back pain (back surgery fusion),   Abdominal     Abdomen: tender.   Substance History - negative use     OB/GYN negative ob/gyn ROS         Other   arthritis,          Phys Exam Other: Abdomen distended  NGT in situ                Anesthesia Plan    ASA 3     general   Rapid sequence  (Patient understands anesthesia not responsible for dental damage.)  intravenous induction     Anesthetic plan, risks, benefits, and alternatives have been provided, discussed and informed consent has been obtained with: patient.    Use of blood products discussed with patient  Consented to blood products.   Plan discussed with CRNA.        CODE STATUS:    Code Status (Patient has no pulse and is not breathing): CPR (Attempt to Resuscitate)  Medical Interventions (Patient has pulse or is breathing): Full Support

## 2022-08-19 NOTE — OP NOTE
LAPAROTOMY EXPLORATORY  Procedure Report    Patient Name:  Fanta Hawkins  YOB: 1950    Date of Surgery:  8/19/2022     Indications:  SBO    Pre-op Diagnosis:   Small bowel obstruction (HCC) [K56.609]       Post-Op Diagnosis Codes:     * Small bowel obstruction (HCC) [K56.609]    Procedure/CPT® Codes:  Procedure(s):  LAPAROTOMY EXPLORATORY WITH EXTENSIVE LYSIS OF ADHESIONS    Staff:  Surgeon(s):  Snehal Medina MD    Assistant: Melanie Montero CSA    Anesthesia: General    Estimated Blood Loss: 5 mL    Implants:    Implant Name Type Inv. Item Serial No.  Lot No. LRB No. Used Action   CLIPAPPLR M/ ENDO LIGACLIP 20CLP 11IN MD - [id number] Implant CLIPAPPLR M/ ENDO LIGACLIP 20CLP 11IN MD  [employer]  DIV OF  AND J [id number] N/A 1 Implanted   CLIPAPPLR M/ ENDO LIGACLIP 20CLP 11IN MD - [id number] Implant CLIPAPPLR M/ ENDO LIGACLIP 20CLP 11IN MD  [employer]  DIV OF  AND J [id number] N/A 1 Implanted   CLIPAPPLR M/ ENDO LIGACLIP 20CLP 11IN MD - [id number] Implant CLIPAPPLR M/ ENDO LIGACLIP 20CLP 11IN MD  [employer]  DIV OF  AND J [id number] N/A 1 Implanted       Specimen:          None        Findings: none    Complications: none    Description of Procedure:   After informed consent was obtained, the patient was taken to the O.R. and placed supine on the table, and after adequate anesthesia, they were prepped and draped. We began by making a midline incision and dissecting down to the level of the fascia. The fascia was grasped, elevated and incised. Care was taken not to injure the underlying structures. Adhesions between the bowel and abdominal wall were taken down with no enterotomies or deserosalizations. Once we had done all this, we were able to see the transition zone caused by a dense band/adhesion and there was also an associated internal hernia more superiorly. The band and other adhesions were taken down and the lysis of adhesions did take more than 45  min. There was a large amount adhesions throughout the abdomen.  The abdomen was irrigated with saline. The fascia was closed with looped PDS superiorly and inferiorly after we had changed gowns, gloves and all instruments. The skin was closed with staples at all sites. The patient was awakened and taken to the recovery room in good condition.  Assistant: Melanie Montero CSA  was responsible for performing the following activities: Retraction, Suction and Irrigation and their skilled assistance was necessary for the success of this case.    Snehal Medina MD     Date: 8/19/2022  Time: 11:24 EDT

## 2022-08-19 NOTE — PLAN OF CARE
Goal Outcome Evaluation:     Progress: no change    Pt A&Ox4 with c/o nausea PRN medication administered per pt request (see MAR). No c/o pain this shift, pt went down to CT and tolerated well. Vitals stable and will continue with plan of care.

## 2022-08-19 NOTE — PROGRESS NOTES
PROGRESS NOTE     Patient Name:  Fanta Hawkins  YOB: 1950  [id number]   LOS: 8 days   * No surgery found *  Patient Care Team:  Eduard Minaya MD as PCP - General (Internal Medicine)      Chief complaint: abd pain      Subjective     Interval History: VSS, afebrile.  CT scan abd and pelvis still show high grade obstruction.  Started having vomiting around NG tube yesterday and this morning.  Denies any pain.  No flatus, no BM      Review of Systems:    All complete review of systems was performed and all are negative except what is documented in the HPI.       Objective     Vital Signs  Temp:  [97.5 °F (36.4 °C)-98.8 °F (37.1 °C)] 97.7 °F (36.5 °C)  Heart Rate:  [] 90  Resp:  [16-18] 18  BP: (151-166)/(83-94) 158/88    Physical Exam:     General Appearance:   Alert, cooperative, in no acute distress   Head:   Normocephalic, without obvious abnormality, atraumatic   Eyes:            Lids and lashes normal, conjunctivae and sclerae normal, no     Icterus    Ears:   Ears appear intact with no abnormalities noted   Throat:  No oral lesions, oral mucosa moist  Neck: supple, trachea midline    Lungs:    Respirations regular, even and unlabored    Heart:   Regular rhythm and normal rate   Chest Wall:   No abnormalities observed   Abdomen:    no masses, no organomegaly, soft non-tender, mild distended, no guarding   Extremities:  Moves all extremities well, no edema, no cyanosis, no               redness   Skin:  No bleeding, bruising or rash   Neurologic:  A/Ox3 with no deficits       Results Review:     I reviewed the patient's new clinical results including CT scan of abd pelvis with contrast    LABS:  Lab Results (last 72 hours)     Procedure Component Value Units Date/Time    Basic Metabolic Panel [[id number]]  (Abnormal) Collected: 08/18/22 0837    Specimen: Blood Updated: 08/18/22 0932     Glucose 141 mg/dL      BUN 5 mg/dL      Creatinine 0.67 mg/dL      Sodium 139 mmol/L       Potassium 3.6 mmol/L      Chloride 101 mmol/L      CO2 28.0 mmol/L      Calcium 8.8 mg/dL      BUN/Creatinine Ratio 7.5     Anion Gap 10.0 mmol/L      eGFR 94.7 mL/min/1.73      Comment: National Kidney Foundation and American Society of Nephrology (ASN) Task Force recommended calculation based on the Chronic Kidney Disease Epidemiology Collaboration (CKD-EPI) equation refit without adjustment for race.       Narrative:      GFR Normal >60  Chronic Kidney Disease <60  Kidney Failure <15      Magnesium [[id number]]  (Normal) Collected: 08/18/22 0837    Specimen: Blood Updated: 08/18/22 0929     Magnesium 1.8 mg/dL     CBC & Differential [[id number]]  (Abnormal) Collected: 08/18/22 0837    Specimen: Blood Updated: 08/18/22 0912    Narrative:      The following orders were created for panel order CBC & Differential.  Procedure                               Abnormality         Status                     ---------                               -----------         ------                     CBC Auto Differential[[id number]]        Abnormal            Final result                 Please view results for these tests on the individual orders.    CBC Auto Differential [[id number]]  (Abnormal) Collected: 08/18/22 0837    Specimen: Blood Updated: 08/18/22 0912     WBC 7.70 10*3/mm3      RBC 3.67 10*6/mm3      Hemoglobin 12.4 g/dL      Hematocrit 36.1 %      MCV 98.4 fL      MCH 33.8 pg      MCHC 34.3 g/dL      RDW 11.9 %      RDW-SD 43.3 fl      MPV 8.8 fL      Platelets 304 10*3/mm3      Neutrophil % 76.1 %      Lymphocyte % 9.7 %      Monocyte % 12.1 %      Eosinophil % 1.2 %      Basophil % 0.3 %      Immature Grans % 0.6 %      Neutrophils, Absolute 5.86 10*3/mm3      Lymphocytes, Absolute 0.75 10*3/mm3      Monocytes, Absolute 0.93 10*3/mm3      Eosinophils, Absolute 0.09 10*3/mm3      Basophils, Absolute 0.02 10*3/mm3      Immature Grans, Absolute 0.05 10*3/mm3      nRBC 0.0 /100 WBC           IMAGING:  Imaging Results (Last  72 Hours)     Procedure Component Value Units Date/Time    CT Abdomen Pelvis With Contrast [[id number]] Collected: 08/18/22 2141     Updated: 08/18/22 2144    Narrative:      PROCEDURE: CT ABDOMEN PELVIS W CONTRAST     COMPARISON: [facility], CT, CT ABDOMEN PELVIS W CONTRAST, 8/11/2022, 8:04.     INDICATIONS: bowel obstruction, please do scan at least 6 hours after last dose of oral contrast     TECHNIQUE: After obtaining the patient's consent, CT images were created with non-ionic intravenous   contrast material.       PROTOCOL:   Standard imaging protocol performed      RADIATION:   DLP: 312mGy*cm    Automated exposure control was utilized to minimize radiation dose.   CONTRAST: 70cc Isovue 370 I.V.  LABS:   eGFR: 87ml/min/1.73m2     FINDINGS:   There is diffuse distention of the jejunum and proximal and mid ileum with completely collapsed   distal ileum.  There is a small amount of gas and stool in the colon.  There are diffuse small   bowel air-fluid levels.  There is no pneumatosis intestinalis or pneumoperitoneum.  Appearance is   most consistent with a high-grade mechanical distal small bowel obstruction.  There is no obvious   obstructing lesion identified.  The appendix is normal.  There is a small to moderate volume of   scattered ascites, likely reactive.  The urinary bladder is mildly distended.  The patient is   status post hysterectomy.  There is mild aortoiliac atherosclerosis.  There are tiny liver cysts.    The gallbladder is prominent without evidence of acute cholecystitis.  No biliary distention.  The   pancreas, spleen, adrenal glands and ureters appear within normal limits.  There are numerous   subcentimeter bilateral simple kidney cysts.     There is mild body wall edema.  There are moderate right and small left pleural effusion with   overlying atelectasis.  The aerated portions of the lower lungs appear otherwise normal.  A gastric   suction tube terminates in the stomach.  The  heart size is normal.  There are no acute osseous   abnormalities or destructive bone lesions.  There is a posterior lumbar fusion construct at the   L4-L5 level.  There is mild levocurvature of the mid lumbar spine and there are moderate spinal   degenerative changes.       Impression:         1. There are findings that would suggest the presence of a distal small bowel obstruction.  There   is no obvious obstructing lesion or clearly identifiable transition point.    2. There is a small to moderate volume of ascites, likely reactive.  3. Mild anasarca.  Moderate right and small left pleural effusions with overlying atelectasis.  4. Gastric suction tube in the stomach with gastric and duodenal decompression.  5. Thoracolumbar degenerative changes and postoperative changes in the lumbar spine.            MELODIE NGUYEN MD         Electronically Signed and Approved By: MELODIE NGUYEN MD on 8/18/2022 at 21:41                     XR Chest 1 View [[id number]] Collected: 08/18/22 1340     Updated: 08/18/22 1343    Narrative:      PROCEDURE: XR CHEST 1 VW     COMPARISON: [facility], CR, XR ABDOMEN FLAT AND UPRIGHT, 8/17/2022, 4:46.  [facility], CR, CHEST AP/PA 1 VIEW, 1/20/2014, 15:48.     INDICATIONS: NG tube placement     FINDINGS:   NG tube tip is in the proximal esophagus at the level of the aortic arch.     The heart is normal in size.     Subsegmental atelectasis is seen at the lung bases.  The left costophrenic angle is slightly   blunted.     Bony structures appear intact.       Impression:         NG tube tip is in the proximal esophagus, at the level of the aortic arch.                  VAMSHI CANO MD         Electronically Signed and Approved By: VAMSHI CANO MD on 8/18/2022 at 13:39                     XR Abdomen Flat & Upright [[id number]] Collected: 08/17/22 0603     Updated: 08/17/22 0606    Narrative:      PROCEDURE: XR ABDOMEN FLAT AND UPRIGHT     COMPARISON: [place]  [facility], CR, XR ABDOMEN FLAT AND UPRIGHT, 2022, 4:44.     INDICATIONS: SMALL BOWEL OBSTRUCTION     FINDINGS:   There are multiple distended small bowel loops, unchanged from the prior study.  Gastric suction   tube terminates in the stomach.  Stable basilar lung opacities.  No pneumatosis intestinalis or   evidence of pneumoperitoneum.  Soft tissue anchors noted in the superior pubic rami and posterior   fusion hardware noted in the lower lumbar spine.       Impression:       Stable small-bowel distention concerning for ongoing obstruction.            MELODIE NGUYEN MD         Electronically Signed and Approved By: MELODIE NGUYEN MD on 2022 at 6:03                           Medications:    Current Facility-Administered Medications:   •  acetaminophen (TYLENOL) tablet 650 mg, 650 mg, Nasogastric, Q4H PRN **OR** acetaminophen (TYLENOL) 160 MG/5ML solution 650 mg, 650 mg, Nasogastric, Q4H PRN **OR** acetaminophen (TYLENOL) suppository 650 mg, 650 mg, Rectal, Q4H PRN, Mitchel Vicente MD  •  aluminum-magnesium hydroxide-simethicone (MAALOX MAX) 400-400-40 MG/5ML suspension 15 mL, 15 mL, Nasogastric, Q6H PRN, Mitchel Vicente MD  •  dextrose 5 % and sodium chloride 0.9 % with KCl 20 mEq/L infusion, 100 mL/hr, Intravenous, Continuous, Ruchi Villarreal APRN, Last Rate: 100 mL/hr at 22 0457, 100 mL/hr at 22 0457  •  Enoxaparin Sodium (LOVENOX) syringe 30 mg, 30 mg, Subcutaneous, Q24H, Mitchel Vicente MD, 30 mg at 22 1243  •  famotidine (PEPCID) injection 20 mg, 20 mg, Intravenous, Q12H, Ruchi Villarreal APRN, 20 mg at 22 2126  •  HYDROmorphone (DILAUDID) injection 0.5 mg, 0.5 mg, Intravenous, Q6H PRN, Mitchel Vicente MD, 0.5 mg at 22 1424  •  ipratropium-albuterol (DUO-NEB) nebulizer solution 3 mL, 3 mL, Nebulization, Q4H PRN, Vish Vieira MD  •  melatonin tablet 5 mg, 5 mg, Nasogastric, Nightly PRN, Mitchel Vicente MD  •  []  HYDROmorphone (DILAUDID) injection 0.5 mg, 0.5 mg, Intravenous, Q2H PRN, 0.5 mg at 08/18/22 0418 **AND** naloxone (NARCAN) injection 0.4 mg, 0.4 mg, Intravenous, Q5 Min PRN, Vish Vieira MD  •  ondansetron (ZOFRAN) injection 4 mg, 4 mg, Intravenous, Q4H PRN, Mitchel Vicente MD, 4 mg at 08/19/22 0406  •  phenol (CHLORASEPTIC) 1.4 % liquid 1 spray, 1 spray, Mouth/Throat, Q2H PRN, Maria C Aranda, APRN, 1 spray at 08/17/22 1441  •  sodium chloride 0.9 % flush 10 mL, 10 mL, Intravenous, PRN, Mark Davis, DO  •  sodium chloride 0.9 % flush 10 mL, 10 mL, Intravenous, PRN, Vish Vieira MD  •  sodium chloride 0.9 % flush 10 mL, 10 mL, Intravenous, Q12H, Vish Vieira MD, 10 mL at 08/18/22 2126  •  sodium chloride 0.9 % infusion 40 mL, 40 mL, Intravenous, PRN, Vish Vieira MD    Assessment & Plan       Nicotine use    Small bowel obstruction (HCC)    COPD (chronic obstructive pulmonary disease) (HCC)   -case request for exploratory laparotomy   -consent for exploratory laparotomy by Dr Medina risks, benefits, alternatives discussed with patient and family including risk of infection, risk of bleeding, risk of damage to surrounding structures including colon perforation    -zosyn 3.375 g IV on call to OR   -      JIN Schmidt  08/19/22  07:55 EDT    Electronically signed by JIN Schmidt, 08/19/22, 7:55 AM EDT.

## 2022-08-19 NOTE — H&P (VIEW-ONLY)
PROGRESS NOTE     Patient Name:  Fanta Hawkins  YOB: 1950  [id number]   LOS: 8 days   * No surgery found *  Patient Care Team:  Eduard Minaya MD as PCP - General (Internal Medicine)      Chief complaint: abd pain      Subjective     Interval History: VSS, afebrile.  CT scan abd and pelvis still show high grade obstruction.  Started having vomiting around NG tube yesterday and this morning.  Denies any pain.  No flatus, no BM      Review of Systems:    All complete review of systems was performed and all are negative except what is documented in the HPI.       Objective     Vital Signs  Temp:  [97.5 °F (36.4 °C)-98.8 °F (37.1 °C)] 97.7 °F (36.5 °C)  Heart Rate:  [] 90  Resp:  [16-18] 18  BP: (151-166)/(83-94) 158/88    Physical Exam:     General Appearance:   Alert, cooperative, in no acute distress   Head:   Normocephalic, without obvious abnormality, atraumatic   Eyes:            Lids and lashes normal, conjunctivae and sclerae normal, no     Icterus    Ears:   Ears appear intact with no abnormalities noted   Throat:  No oral lesions, oral mucosa moist  Neck: supple, trachea midline    Lungs:    Respirations regular, even and unlabored    Heart:   Regular rhythm and normal rate   Chest Wall:   No abnormalities observed   Abdomen:    no masses, no organomegaly, soft non-tender, mild distended, no guarding   Extremities:  Moves all extremities well, no edema, no cyanosis, no               redness   Skin:  No bleeding, bruising or rash   Neurologic:  A/Ox3 with no deficits       Results Review:     I reviewed the patient's new clinical results including CT scan of abd pelvis with contrast    LABS:  Lab Results (last 72 hours)     Procedure Component Value Units Date/Time    Basic Metabolic Panel [[id number]]  (Abnormal) Collected: 08/18/22 0837    Specimen: Blood Updated: 08/18/22 0932     Glucose 141 mg/dL      BUN 5 mg/dL      Creatinine 0.67 mg/dL      Sodium 139 mmol/L       Potassium 3.6 mmol/L      Chloride 101 mmol/L      CO2 28.0 mmol/L      Calcium 8.8 mg/dL      BUN/Creatinine Ratio 7.5     Anion Gap 10.0 mmol/L      eGFR 94.7 mL/min/1.73      Comment: National Kidney Foundation and American Society of Nephrology (ASN) Task Force recommended calculation based on the Chronic Kidney Disease Epidemiology Collaboration (CKD-EPI) equation refit without adjustment for race.       Narrative:      GFR Normal >60  Chronic Kidney Disease <60  Kidney Failure <15      Magnesium [[id number]]  (Normal) Collected: 08/18/22 0837    Specimen: Blood Updated: 08/18/22 0929     Magnesium 1.8 mg/dL     CBC & Differential [[id number]]  (Abnormal) Collected: 08/18/22 0837    Specimen: Blood Updated: 08/18/22 0912    Narrative:      The following orders were created for panel order CBC & Differential.  Procedure                               Abnormality         Status                     ---------                               -----------         ------                     CBC Auto Differential[[id number]]        Abnormal            Final result                 Please view results for these tests on the individual orders.    CBC Auto Differential [[id number]]  (Abnormal) Collected: 08/18/22 0837    Specimen: Blood Updated: 08/18/22 0912     WBC 7.70 10*3/mm3      RBC 3.67 10*6/mm3      Hemoglobin 12.4 g/dL      Hematocrit 36.1 %      MCV 98.4 fL      MCH 33.8 pg      MCHC 34.3 g/dL      RDW 11.9 %      RDW-SD 43.3 fl      MPV 8.8 fL      Platelets 304 10*3/mm3      Neutrophil % 76.1 %      Lymphocyte % 9.7 %      Monocyte % 12.1 %      Eosinophil % 1.2 %      Basophil % 0.3 %      Immature Grans % 0.6 %      Neutrophils, Absolute 5.86 10*3/mm3      Lymphocytes, Absolute 0.75 10*3/mm3      Monocytes, Absolute 0.93 10*3/mm3      Eosinophils, Absolute 0.09 10*3/mm3      Basophils, Absolute 0.02 10*3/mm3      Immature Grans, Absolute 0.05 10*3/mm3      nRBC 0.0 /100 WBC           IMAGING:  Imaging Results (Last  72 Hours)     Procedure Component Value Units Date/Time    CT Abdomen Pelvis With Contrast [[id number]] Collected: 08/18/22 2141     Updated: 08/18/22 2144    Narrative:      PROCEDURE: CT ABDOMEN PELVIS W CONTRAST     COMPARISON: [facility], CT, CT ABDOMEN PELVIS W CONTRAST, 8/11/2022, 8:04.     INDICATIONS: bowel obstruction, please do scan at least 6 hours after last dose of oral contrast     TECHNIQUE: After obtaining the patient's consent, CT images were created with non-ionic intravenous   contrast material.       PROTOCOL:   Standard imaging protocol performed      RADIATION:   DLP: 312mGy*cm    Automated exposure control was utilized to minimize radiation dose.   CONTRAST: 70cc Isovue 370 I.V.  LABS:   eGFR: 87ml/min/1.73m2     FINDINGS:   There is diffuse distention of the jejunum and proximal and mid ileum with completely collapsed   distal ileum.  There is a small amount of gas and stool in the colon.  There are diffuse small   bowel air-fluid levels.  There is no pneumatosis intestinalis or pneumoperitoneum.  Appearance is   most consistent with a high-grade mechanical distal small bowel obstruction.  There is no obvious   obstructing lesion identified.  The appendix is normal.  There is a small to moderate volume of   scattered ascites, likely reactive.  The urinary bladder is mildly distended.  The patient is   status post hysterectomy.  There is mild aortoiliac atherosclerosis.  There are tiny liver cysts.    The gallbladder is prominent without evidence of acute cholecystitis.  No biliary distention.  The   pancreas, spleen, adrenal glands and ureters appear within normal limits.  There are numerous   subcentimeter bilateral simple kidney cysts.     There is mild body wall edema.  There are moderate right and small left pleural effusion with   overlying atelectasis.  The aerated portions of the lower lungs appear otherwise normal.  A gastric   suction tube terminates in the stomach.  The  heart size is normal.  There are no acute osseous   abnormalities or destructive bone lesions.  There is a posterior lumbar fusion construct at the   L4-L5 level.  There is mild levocurvature of the mid lumbar spine and there are moderate spinal   degenerative changes.       Impression:         1. There are findings that would suggest the presence of a distal small bowel obstruction.  There   is no obvious obstructing lesion or clearly identifiable transition point.    2. There is a small to moderate volume of ascites, likely reactive.  3. Mild anasarca.  Moderate right and small left pleural effusions with overlying atelectasis.  4. Gastric suction tube in the stomach with gastric and duodenal decompression.  5. Thoracolumbar degenerative changes and postoperative changes in the lumbar spine.            MELODIE NGUYEN MD         Electronically Signed and Approved By: MELODIE NGUYEN MD on 8/18/2022 at 21:41                     XR Chest 1 View [[id number]] Collected: 08/18/22 1340     Updated: 08/18/22 1343    Narrative:      PROCEDURE: XR CHEST 1 VW     COMPARISON: [facility], CR, XR ABDOMEN FLAT AND UPRIGHT, 8/17/2022, 4:46.  [facility], CR, CHEST AP/PA 1 VIEW, 1/20/2014, 15:48.     INDICATIONS: NG tube placement     FINDINGS:   NG tube tip is in the proximal esophagus at the level of the aortic arch.     The heart is normal in size.     Subsegmental atelectasis is seen at the lung bases.  The left costophrenic angle is slightly   blunted.     Bony structures appear intact.       Impression:         NG tube tip is in the proximal esophagus, at the level of the aortic arch.                  VAMSHI CANO MD         Electronically Signed and Approved By: VAMSHI CANO MD on 8/18/2022 at 13:39                     XR Abdomen Flat & Upright [[id number]] Collected: 08/17/22 0603     Updated: 08/17/22 0606    Narrative:      PROCEDURE: XR ABDOMEN FLAT AND UPRIGHT     COMPARISON: [place]  [facility], CR, XR ABDOMEN FLAT AND UPRIGHT, 2022, 4:44.     INDICATIONS: SMALL BOWEL OBSTRUCTION     FINDINGS:   There are multiple distended small bowel loops, unchanged from the prior study.  Gastric suction   tube terminates in the stomach.  Stable basilar lung opacities.  No pneumatosis intestinalis or   evidence of pneumoperitoneum.  Soft tissue anchors noted in the superior pubic rami and posterior   fusion hardware noted in the lower lumbar spine.       Impression:       Stable small-bowel distention concerning for ongoing obstruction.            MELODIE NGUYEN MD         Electronically Signed and Approved By: MELODIE NGUYEN MD on 2022 at 6:03                           Medications:    Current Facility-Administered Medications:   •  acetaminophen (TYLENOL) tablet 650 mg, 650 mg, Nasogastric, Q4H PRN **OR** acetaminophen (TYLENOL) 160 MG/5ML solution 650 mg, 650 mg, Nasogastric, Q4H PRN **OR** acetaminophen (TYLENOL) suppository 650 mg, 650 mg, Rectal, Q4H PRN, Mitchel Vicente MD  •  aluminum-magnesium hydroxide-simethicone (MAALOX MAX) 400-400-40 MG/5ML suspension 15 mL, 15 mL, Nasogastric, Q6H PRN, Mitchel Vicente MD  •  dextrose 5 % and sodium chloride 0.9 % with KCl 20 mEq/L infusion, 100 mL/hr, Intravenous, Continuous, Ruchi Villarreal APRN, Last Rate: 100 mL/hr at 22 0457, 100 mL/hr at 22 0457  •  Enoxaparin Sodium (LOVENOX) syringe 30 mg, 30 mg, Subcutaneous, Q24H, Mitchel Vicente MD, 30 mg at 22 1243  •  famotidine (PEPCID) injection 20 mg, 20 mg, Intravenous, Q12H, Ruchi Villarreal APRN, 20 mg at 22 2126  •  HYDROmorphone (DILAUDID) injection 0.5 mg, 0.5 mg, Intravenous, Q6H PRN, Mitchel Vicente MD, 0.5 mg at 22 1424  •  ipratropium-albuterol (DUO-NEB) nebulizer solution 3 mL, 3 mL, Nebulization, Q4H PRN, Vish Vieira MD  •  melatonin tablet 5 mg, 5 mg, Nasogastric, Nightly PRN, Mitchel Vicente MD  •  []  HYDROmorphone (DILAUDID) injection 0.5 mg, 0.5 mg, Intravenous, Q2H PRN, 0.5 mg at 08/18/22 0418 **AND** naloxone (NARCAN) injection 0.4 mg, 0.4 mg, Intravenous, Q5 Min PRN, Vish Vieira MD  •  ondansetron (ZOFRAN) injection 4 mg, 4 mg, Intravenous, Q4H PRN, Mitchel Vicente MD, 4 mg at 08/19/22 0406  •  phenol (CHLORASEPTIC) 1.4 % liquid 1 spray, 1 spray, Mouth/Throat, Q2H PRN, Maria C Aranda, APRN, 1 spray at 08/17/22 1441  •  sodium chloride 0.9 % flush 10 mL, 10 mL, Intravenous, PRN, Mark Davis, DO  •  sodium chloride 0.9 % flush 10 mL, 10 mL, Intravenous, PRN, Vish Vieira MD  •  sodium chloride 0.9 % flush 10 mL, 10 mL, Intravenous, Q12H, Vish Vieira MD, 10 mL at 08/18/22 2126  •  sodium chloride 0.9 % infusion 40 mL, 40 mL, Intravenous, PRN, Vish Vieira MD    Assessment & Plan       Nicotine use    Small bowel obstruction (HCC)    COPD (chronic obstructive pulmonary disease) (HCC)   -case request for exploratory laparotomy   -consent for exploratory laparotomy by Dr Medina risks, benefits, alternatives discussed with patient and family including risk of infection, risk of bleeding, risk of damage to surrounding structures including colon perforation    -zosyn 3.375 g IV on call to OR   -      JIN Schmidt  08/19/22  07:55 EDT    Electronically signed by JIN Schmidt, 08/19/22, 7:55 AM EDT.

## 2022-08-19 NOTE — ANESTHESIA POSTPROCEDURE EVALUATION
Patient: Fanta Hawkins    Procedure Summary     Date: 08/19/22 Room / Location: [facility] OR  /  [facility]    Anesthesia Start: 0929 Anesthesia Stop: 1129    Procedure: LAPAROTOMY EXPLORATORY (N/A Abdomen) Diagnosis:       Small bowel obstruction (HCC)      (Small bowel obstruction (HCC) [K56.609])    Surgeons: Snehal eMdina MD Provider: Reyes, Mirabelle, DO    Anesthesia Type: general ASA Status: 3          Anesthesia Type: general    Vitals  Vitals Value Taken Time   /108 08/19/22 1137   Temp     Pulse 104 08/19/22 1137   Resp     SpO2 95 % 08/19/22 1137   Vitals shown include unvalidated device data.        Post Anesthesia Care and Evaluation    Patient location during evaluation: bedside  Patient participation: complete - patient participated  Level of consciousness: awake  Pain score: 0  Pain management: adequate    Airway patency: patent  Anesthetic complications: No anesthetic complications  PONV Status: none  Cardiovascular status: acceptable and stable  Respiratory status: acceptable and room air  Hydration status: acceptable    Comments: An Anesthesiologist personally participated in the most demanding procedures (including induction and emergence if applicable) in the anesthesia plan, monitored the course of anesthesia administration at frequent intervals and remained physically present and available for immediate diagnosis and treatment of emergencies.

## 2022-08-19 NOTE — PLAN OF CARE
Goal Outcome Evaluation:      VSS. S/p ex lap today. Pain controlled per patient; see MAR. Somnolent but arouses to voice. Alert and oriented x4. NG tube to low suction. Trevizo cath in place. UOP. Mariangel Verdugo, RNA            Progress: no change

## 2022-08-20 LAB
ALBUMIN SERPL-MCNC: 2.9 G/DL (ref 3.5–5.2)
ALBUMIN/GLOB SERPL: 1.1 G/DL
ALP SERPL-CCNC: 64 U/L (ref 39–117)
ALT SERPL W P-5'-P-CCNC: 16 U/L (ref 1–33)
ANION GAP SERPL CALCULATED.3IONS-SCNC: 11.2 MMOL/L (ref 5–15)
AST SERPL-CCNC: 20 U/L (ref 1–32)
BASOPHILS # BLD AUTO: 0.01 10*3/MM3 (ref 0–0.2)
BASOPHILS NFR BLD AUTO: 0.1 % (ref 0–1.5)
BILIRUB SERPL-MCNC: 0.3 MG/DL (ref 0–1.2)
BUN SERPL-MCNC: 9 MG/DL (ref 8–23)
BUN/CREAT SERPL: 12 (ref 7–25)
CALCIUM SPEC-SCNC: 7.9 MG/DL (ref 8.6–10.5)
CHLORIDE SERPL-SCNC: 103 MMOL/L (ref 98–107)
CO2 SERPL-SCNC: 23.8 MMOL/L (ref 22–29)
CREAT SERPL-MCNC: 0.75 MG/DL (ref 0.57–1)
DEPRECATED RDW RBC AUTO: 45.7 FL (ref 37–54)
EGFRCR SERPLBLD CKD-EPI 2021: 86.3 ML/MIN/1.73
EOSINOPHIL # BLD AUTO: 0 10*3/MM3 (ref 0–0.4)
EOSINOPHIL NFR BLD AUTO: 0 % (ref 0.3–6.2)
ERYTHROCYTE [DISTWIDTH] IN BLOOD BY AUTOMATED COUNT: 12.4 % (ref 12.3–15.4)
GLOBULIN UR ELPH-MCNC: 2.7 GM/DL
GLUCOSE SERPL-MCNC: 180 MG/DL (ref 65–99)
HCT VFR BLD AUTO: 39.1 % (ref 34–46.6)
HGB BLD-MCNC: 13 G/DL (ref 12–15.9)
IMM GRANULOCYTES # BLD AUTO: 0.1 10*3/MM3 (ref 0–0.05)
IMM GRANULOCYTES NFR BLD AUTO: 0.7 % (ref 0–0.5)
LYMPHOCYTES # BLD AUTO: 0.72 10*3/MM3 (ref 0.7–3.1)
LYMPHOCYTES NFR BLD AUTO: 5.4 % (ref 19.6–45.3)
MAGNESIUM SERPL-MCNC: 1.6 MG/DL (ref 1.6–2.4)
MCH RBC QN AUTO: 33.4 PG (ref 26.6–33)
MCHC RBC AUTO-ENTMCNC: 33.2 G/DL (ref 31.5–35.7)
MCV RBC AUTO: 100.5 FL (ref 79–97)
MONOCYTES # BLD AUTO: 1.18 10*3/MM3 (ref 0.1–0.9)
MONOCYTES NFR BLD AUTO: 8.8 % (ref 5–12)
NEUTROPHILS NFR BLD AUTO: 11.42 10*3/MM3 (ref 1.7–7)
NEUTROPHILS NFR BLD AUTO: 85 % (ref 42.7–76)
NRBC BLD AUTO-RTO: 0 /100 WBC (ref 0–0.2)
PHOSPHATE SERPL-MCNC: 2.8 MG/DL (ref 2.5–4.5)
PLATELET # BLD AUTO: 368 10*3/MM3 (ref 140–450)
PMV BLD AUTO: 9.1 FL (ref 6–12)
POTASSIUM SERPL-SCNC: 4.3 MMOL/L (ref 3.5–5.2)
PROT SERPL-MCNC: 5.6 G/DL (ref 6–8.5)
RBC # BLD AUTO: 3.89 10*6/MM3 (ref 3.77–5.28)
SODIUM SERPL-SCNC: 138 MMOL/L (ref 136–145)
WBC NRBC COR # BLD: 13.43 10*3/MM3 (ref 3.4–10.8)

## 2022-08-20 PROCEDURE — 84100 ASSAY OF PHOSPHORUS: CPT | Performed by: NURSE PRACTITIONER

## 2022-08-20 PROCEDURE — 99024 POSTOP FOLLOW-UP VISIT: CPT | Performed by: SURGERY

## 2022-08-20 PROCEDURE — 80053 COMPREHEN METABOLIC PANEL: CPT | Performed by: NURSE PRACTITIONER

## 2022-08-20 PROCEDURE — 63710000001 PROMETHAZINE PER 12.5 MG: Performed by: SURGERY

## 2022-08-20 PROCEDURE — 85025 COMPLETE CBC W/AUTO DIFF WBC: CPT | Performed by: NURSE PRACTITIONER

## 2022-08-20 PROCEDURE — 25010000002 HYDROMORPHONE 1 MG/ML SOLUTION: Performed by: SURGERY

## 2022-08-20 PROCEDURE — 25010000002 ONDANSETRON PER 1 MG: Performed by: SURGERY

## 2022-08-20 PROCEDURE — 83735 ASSAY OF MAGNESIUM: CPT | Performed by: NURSE PRACTITIONER

## 2022-08-20 PROCEDURE — 25010000002 ENOXAPARIN PER 10 MG: Performed by: SURGERY

## 2022-08-20 RX ORDER — PROMETHAZINE HYDROCHLORIDE 12.5 MG/1
12.5 TABLET ORAL EVERY 6 HOURS PRN
Status: DISCONTINUED | OUTPATIENT
Start: 2022-08-20 | End: 2022-08-22

## 2022-08-20 RX ORDER — NALOXONE HCL 0.4 MG/ML
0.1 VIAL (ML) INJECTION
Status: DISCONTINUED | OUTPATIENT
Start: 2022-08-20 | End: 2022-08-20

## 2022-08-20 RX ADMIN — PROMETHAZINE HYDROCHLORIDE 12.5 MG: 12.5 TABLET ORAL at 17:14

## 2022-08-20 RX ADMIN — DEXTROSE MONOHYDRATE, SODIUM CHLORIDE, AND POTASSIUM CHLORIDE 100 ML/HR: 50; 9; 1.49 INJECTION, SOLUTION INTRAVENOUS at 09:51

## 2022-08-20 RX ADMIN — PHENOL 1 SPRAY: 1.5 LIQUID ORAL at 17:14

## 2022-08-20 RX ADMIN — ONDANSETRON 4 MG: 2 INJECTION INTRAMUSCULAR; INTRAVENOUS at 20:40

## 2022-08-20 RX ADMIN — ENOXAPARIN SODIUM 30 MG: 100 INJECTION SUBCUTANEOUS at 11:48

## 2022-08-20 RX ADMIN — Medication 10 ML: at 08:20

## 2022-08-20 RX ADMIN — ONDANSETRON 4 MG: 2 INJECTION INTRAMUSCULAR; INTRAVENOUS at 14:09

## 2022-08-20 RX ADMIN — HYDROMORPHONE HYDROCHLORIDE 0.5 MG: 1 INJECTION, SOLUTION INTRAMUSCULAR; INTRAVENOUS; SUBCUTANEOUS at 03:33

## 2022-08-20 RX ADMIN — HYDROMORPHONE HYDROCHLORIDE 0.5 MG: 1 INJECTION, SOLUTION INTRAMUSCULAR; INTRAVENOUS; SUBCUTANEOUS at 20:40

## 2022-08-20 RX ADMIN — HYDROMORPHONE HYDROCHLORIDE 0.5 MG: 1 INJECTION, SOLUTION INTRAMUSCULAR; INTRAVENOUS; SUBCUTANEOUS at 09:40

## 2022-08-20 RX ADMIN — ONDANSETRON 4 MG: 2 INJECTION INTRAMUSCULAR; INTRAVENOUS at 09:48

## 2022-08-20 RX ADMIN — FAMOTIDINE 20 MG: 10 INJECTION INTRAVENOUS at 08:20

## 2022-08-20 RX ADMIN — Medication 10 ML: at 20:40

## 2022-08-20 RX ADMIN — FAMOTIDINE 20 MG: 10 INJECTION INTRAVENOUS at 20:41

## 2022-08-20 NOTE — PLAN OF CARE
Goal Outcome Evaluation:  Plan of Care Reviewed With: patient, family        Progress: improving   B/p elevated overnight.  Medicated for pain per MAR with iv dilaudid x2.  Family at bedside.  Pt ambulated with walker, with stand-by assist [facility] around the unit.  Tolerated well.  NGT still in place with no output; patient is requesting it be removed and wants something to drink.

## 2022-08-20 NOTE — PROGRESS NOTES
LOS: 9 days   Patient Care Team:  Eduard Minaya MD as PCP - General (Internal Medicine)      Subjective     Interval History:   Doing ok, sp ex lap with CONSTANZA, npo with ngt, castle still in, no gi function yet, states that pain from prior to surgery has resolved    Objective     Vital Signs  Temp:  [97.16 °F (36.2 °C)-98.5 °F (36.9 °C)] 97.52 °F (36.4 °C)  Heart Rate:  [82-93] 90  Resp:  [16-22] 20  BP: (158-186)/(86-97) 180/86    Physical Exam:  NAD  Abd soft, nd, inc ok     Results Review:     I reviewed the patient's new clinical results.      Assessment & Plan       Nicotine use    Small bowel obstruction (HCC)    COPD (chronic obstructive pulmonary disease) (HCC)    Severe malnutrition (HCC)        Plan:  Keep npo with ngt for now  IVF  Await gi function  Encourage OOB   IS q 2hours

## 2022-08-20 NOTE — PROGRESS NOTES
[facility]     Progress Note    Patient Name: Fanta Hawkins  : 1950  MRN: [id number]  Primary Care Physician:  Eduard Minaya MD  Date of admission: 2022    Subjective    Patient had surgery yesterday  Complains of mild pain this morning  Labs and vitals stable  Haw not passed gas this morning yet    Facility-Administered Medications as of 2022   Medication Dose Route Frequency Provider Last Rate Last Admin   • acetaminophen (TYLENOL) tablet 650 mg  650 mg Nasogastric Q4H PRN Snehal Medina MD        Or   • acetaminophen (TYLENOL) 160 MG/5ML solution 650 mg  650 mg Nasogastric Q4H PRN Snehal Medina MD        Or   • acetaminophen (TYLENOL) suppository 650 mg  650 mg Rectal Q4H PRN Snehal Medina MD       • [] ALPRAZolam (XANAX) tablet 0.5 mg  0.5 mg Nasogastric Q8H PRN Mitchel Vicente MD       • aluminum-magnesium hydroxide-simethicone (MAALOX MAX) 400-400-40 MG/5ML suspension 15 mL  15 mL Nasogastric Q6H PRN Snehal Medina MD       • [] barium (READI-CAT 2) suspension 450 mL  450 mL Nasogastric Q2H Mitchel Vicente MD       • [COMPLETED] Benzocaine 20 % aerosol 1 spray  1 spray Mouth/Throat Once Maria C Aranda APRN   1 spray at 08/15/22 1424   • [COMPLETED] clindamycin (CLEOCIN) 900 mg in dextrose 5% 50 mL IVPB (premix)  900 mg Intravenous Once Snehal Medina MD   900 mg at 22 0935   • dextrose 5 % and sodium chloride 0.9 % with KCl 20 mEq/L infusion  100 mL/hr Intravenous Continuous Snehal Medina  mL/hr at 22 2336 100 mL/hr at 22 2336   • Enoxaparin Sodium (LOVENOX) syringe 30 mg  30 mg Subcutaneous Q24H Snehal Medina MD   30 mg at 22 1405   • [COMPLETED] famotidine (PEPCID) injection 20 mg  20 mg Intravenous Once Mark Davis DO   20 mg at 22 0736   • famotidine (PEPCID) injection 20 mg  20 mg Intravenous Q12H Snehal Medina MD   20 mg at 22 0820   •  [COMPLETED] fentaNYL citrate (PF) (SUBLIMAZE) injection 50 mcg  50 mcg Intravenous Once Mark Davis, DO   50 mcg at 22 0736   • [COMPLETED] glycopyrrolate (ROBINUL) injection 0.2 mg  0.2 mg Intravenous 60 Min Pre-Op Reyes, Mirabelle, DO   0.2 mg at 22 0920   • [] HYDROcodone-acetaminophen (NORCO)  MG per tablet 1 tablet  1 tablet Nasogastric Q4H PRN Mitchel Vicente MD       • [] HYDROcodone-acetaminophen (NORCO) 5-325 MG per tablet 1 tablet  1 tablet Nasogastric Q4H PRN Mitchel Vicente MD       • [] HYDROmorphone (DILAUDID) injection 0.5 mg  0.5 mg Intravenous Q2H PRN Vish Vieira MD   0.5 mg at 22 0418    And   • naloxone (NARCAN) injection 0.4 mg  0.4 mg Intravenous Q5 Min PRN Snehal Medina MD       • HYDROmorphone (DILAUDID) injection 0.5 mg  0.5 mg Intravenous Q6H PRN Snehal Medina MD   0.5 mg at 22 0333   • [COMPLETED] iopamidol (ISOVUE-370) 76 % injection 100 mL  100 mL Intravenous Once in imaging Mark Davis DO   100 mL at 22 0812   • [COMPLETED] iopamidol (ISOVUE-370) 76 % injection 100 mL  100 mL Intravenous Once in imaging Mitchel Vicente MD   100 mL at 22 2103   • ipratropium-albuterol (DUO-NEB) nebulizer solution 3 mL  3 mL Nebulization Q4H PRN Snehal Medina MD       • [COMPLETED] lactulose (CHRONULAC) 10 GM/15ML solution 30 g  30 g Oral Once Vish Vieira MD   30 g at 22 0906   • melatonin tablet 5 mg  5 mg Nasogastric Nightly PRN Snehal Medina MD       • [COMPLETED] metoclopramide (REGLAN) injection 10 mg  10 mg Intravenous Q6H Mitchel Vicente MD   10 mg at 22 0848   • [COMPLETED] midazolam (VERSED) injection 1 mg  1 mg Intravenous Once Reyes, Mirabelle, DO   1 mg at 22 0919   • [COMPLETED] ondansetron (ZOFRAN) injection 4 mg  4 mg Intravenous Once Africa Donaldson MD   4 mg at 22 0622   • [COMPLETED] ondansetron (ZOFRAN) injection 4 mg  4 mg  Intravenous Once Mark Davis DO   4 mg at 22 0736   • [COMPLETED] ondansetron (ZOFRAN) injection 4 mg  4 mg Intravenous Once Mark Davis DO   4 mg at 22 0935   • ondansetron (ZOFRAN) injection 4 mg  4 mg Intravenous Q4H PRN Snehal Medina MD   4 mg at 22 0406   • ondansetron (ZOFRAN) injection 4 mg  4 mg Intravenous Q6H PRN Snehal Medina MD       • phenol (CHLORASEPTIC) 1.4 % liquid 1 spray  1 spray Mouth/Throat Q2H PRN Snehal Medina MD   1 spray at 22 1441   • [] piperacillin-tazobactam (ZOSYN) 3.375 g in iso-osmotic dextrose 50 ml (premix)  3.375 g Intravenous On Call Snehal Medina MD       • [COMPLETED] potassium chloride (MICRO-K) CR capsule 40 mEq  40 mEq Oral Once Vish Vieira MD   40 mEq at 22 0906   • [] potassium chloride 20 mEq in 50 mL IVPB  20 mEq Intravenous Q1H Vish Vieira MD 50 mL/hr at 22 1145 20 mEq at 22 1145   • [COMPLETED] sodium chloride 0.9 % bolus 1,000 mL  1,000 mL Intravenous Once Mark Davis DO   Stopped at 22 0806   • sodium chloride 0.9 % flush 10 mL  10 mL Intravenous PRN Snehal Medina MD       • sodium chloride 0.9 % flush 10 mL  10 mL Intravenous PRN Snehal Medina MD       • sodium chloride 0.9 % flush 10 mL  10 mL Intravenous Q12H Snehal Medina MD   10 mL at 22 0820   • sodium chloride 0.9 % infusion 40 mL  40 mL Intravenous PRN Snehal Medina MD       • sodium chloride 0.9 % infusion  9 mL/hr Intravenous Continuous PRN Snehal Medina MD              Review of Systems  Constitutional:        Weakness tiredness fatigue  Eyes:                       No blurry vision, eye discharge, eye irritation, eye pain  HEENT:                   No acute hair loss, earache and discharge, nasal congestion or discharge, sore throat, postnasal drip  Respiratory:           No shortness of breath coughing sputum production wheezing hemoptysis  pleuritic chest pain  Cardiovascular:     No chest pain, orthopnea, PND, dizziness, palpitation, lower extremity edema  Gastrointestinal:   No nausea vomiting diarrhea abdominal pain constipation  Genitourinary:       No urinary incontinence, hesitancy, frequency, urgency, dysuria  Hematologic:         No bruising, bleeding, pallor, lymphadenopathy  Endocrine:            No coldness, hot flashes, polyuria, abnormal hair growth  Musculoskeletal:    No body pains, aches, arthritic pains, muscle pain ,muscle wasting  Psychiatric:          No low or high mood, anxiety, hallucinations, delusions  Skin.                      No rash, ulcers, bruising, itching  Neurological:        No confusion, headache, focal weakness, numbness, dysphasia    Objective   Objective     Vitals:   Temp:  [97 °F (36.1 °C)-98.5 °F (36.9 °C)] 97.4 °F (36.3 °C)  Heart Rate:  [] 91  Resp:  [16-22] 18  BP: (138-186)/() 186/92  Flow (L/min):  [2-5] 2  Physical Exam    Constitutional: Awake, alert responsive, conversant, no obvious distress              Psychiatric:  Appropriate affect, cooperative   Neurologic:  Awake alert ,oriented x 3, strength symmetric in all extremities, Cranial Nerves grossly intact to confrontation, speech clear   Eyes:   PERRLA, sclerae anicteric, no conjunctival injection   HEENT:  Moist mucous membranes, no nasal or eye discharge, no throat congestion   Neck:   Supple, no thyromegaly, no lymphadenopathy, trachea midline, no elevated JVD   Respiratory:  Clear to auscultation bilaterally, nonlabored respirations    Cardiovascular: RRR, no murmurs, rubs, or gallops, palpable pedal pulses bilaterally, No bilateral ankle edema   Gastrointestinal: _ve bowel sounds, soft, nontender, nondistended, no organomegaly   Musculoskeletal:  No clubbing or cyanosis to extremities,muscle wasting, joint swelling, muscle weakness             Skin:                      No rashes, bruising, skin ulcers, petechiae or  ecchymosis    Result Review    Result Review:  I have personally reviewed the results from the time of this admission to 8/20/2022 08:42 EDT and agree with these findings:  [x]  Laboratory  [x]  Microbiology  [x]  Radiology  []  EKG/Telemetry   []  Cardiology/Vascular   []  Pathology  []  Old records  []  Other:    Assessment & Plan   Assessment / Plan       Active Hospital Problems:  Active Hospital Problems    Diagnosis    • Severe malnutrition (HCC)    • COPD (chronic obstructive pulmonary disease) (HCC)    • Small bowel obstruction (HCC)    • Nicotine use     71 y.o. female With past medical history significant for COPD, gerd, constipation, osteoarthritis came to the emergency room complaining of nausea vomiting abdominal pain,CT of the abdomen and pelvis showed small bowel obstruction. Has prior hx of abdominal surgery and bowel obstruction. NG placed and decompressed with improvement in abdominal pain and removed and again placed 8/16 due to no bowel movements and surgery consulted with aggressive bowel regiment and surgery done 8/19 with extensive lysis of adhesion     Plan:   Continue ng tube.   May dc castle  d5 water with kcl. Will continue today  Avoid narcotic pain medicines as much as possible  NPO for now

## 2022-08-20 NOTE — PLAN OF CARE
Goal Outcome Evaluation:      A&Ox4. VSS. UOP. Trevizo and NG tube continued per MD. Pain controlled per patient. Nausea intermittent; see MAR. Ambulating in [place]. Mariangel Verdugo, RNA      Progress: no change

## 2022-08-21 PROCEDURE — 25010000002 HYDROMORPHONE 1 MG/ML SOLUTION: Performed by: SURGERY

## 2022-08-21 PROCEDURE — 25010000002 ENOXAPARIN PER 10 MG: Performed by: SURGERY

## 2022-08-21 PROCEDURE — 99024 POSTOP FOLLOW-UP VISIT: CPT | Performed by: SURGERY

## 2022-08-21 PROCEDURE — 25010000002 ONDANSETRON PER 1 MG: Performed by: SURGERY

## 2022-08-21 RX ADMIN — HYDROMORPHONE HYDROCHLORIDE 0.5 MG: 1 INJECTION, SOLUTION INTRAMUSCULAR; INTRAVENOUS; SUBCUTANEOUS at 04:28

## 2022-08-21 RX ADMIN — Medication 10 ML: at 08:57

## 2022-08-21 RX ADMIN — Medication 10 ML: at 20:48

## 2022-08-21 RX ADMIN — ENOXAPARIN SODIUM 30 MG: 100 INJECTION SUBCUTANEOUS at 12:03

## 2022-08-21 RX ADMIN — HYDROMORPHONE HYDROCHLORIDE 0.5 MG: 1 INJECTION, SOLUTION INTRAMUSCULAR; INTRAVENOUS; SUBCUTANEOUS at 15:17

## 2022-08-21 RX ADMIN — DEXTROSE MONOHYDRATE, SODIUM CHLORIDE, AND POTASSIUM CHLORIDE 100 ML/HR: 50; 9; 1.49 INJECTION, SOLUTION INTRAVENOUS at 04:22

## 2022-08-21 RX ADMIN — DEXTROSE MONOHYDRATE, SODIUM CHLORIDE, AND POTASSIUM CHLORIDE 100 ML/HR: 50; 9; 1.49 INJECTION, SOLUTION INTRAVENOUS at 20:48

## 2022-08-21 RX ADMIN — FAMOTIDINE 20 MG: 10 INJECTION INTRAVENOUS at 08:57

## 2022-08-21 RX ADMIN — ONDANSETRON 4 MG: 2 INJECTION INTRAMUSCULAR; INTRAVENOUS at 04:28

## 2022-08-21 RX ADMIN — FAMOTIDINE 20 MG: 10 INJECTION INTRAVENOUS at 20:48

## 2022-08-21 RX ADMIN — HYDROMORPHONE HYDROCHLORIDE 0.5 MG: 1 INJECTION, SOLUTION INTRAMUSCULAR; INTRAVENOUS; SUBCUTANEOUS at 20:48

## 2022-08-21 NOTE — PROGRESS NOTES
[facility]     Progress Note    Patient Name: Fanta Hawkins  : 1950  MRN: [id number]  Primary Care Physician:  Eduard Minaya MD  Date of admission: 2022    Subjective    NG and castle removed this morning  Still no bowel movements  No nausea or vomiting    Facility-Administered Medications as of 2022   Medication Dose Route Frequency Provider Last Rate Last Admin   • acetaminophen (TYLENOL) tablet 650 mg  650 mg Nasogastric Q4H PRN Snehal Medina MD        Or   • acetaminophen (TYLENOL) 160 MG/5ML solution 650 mg  650 mg Nasogastric Q4H PRN Snehal Medina MD        Or   • acetaminophen (TYLENOL) suppository 650 mg  650 mg Rectal Q4H PRN Snehal Medina MD       • [] ALPRAZolam (XANAX) tablet 0.5 mg  0.5 mg Nasogastric Q8H PRN Mitchel Vicente MD       • aluminum-magnesium hydroxide-simethicone (MAALOX MAX) 400-400-40 MG/5ML suspension 15 mL  15 mL Nasogastric Q6H PRN Snehal Medina MD       • [] barium (READI-CAT 2) suspension 450 mL  450 mL Nasogastric Q2H Mitchel Vicente MD       • [COMPLETED] Benzocaine 20 % aerosol 1 spray  1 spray Mouth/Throat Once Maria C Aranda APRN   1 spray at 08/15/22 1424   • [COMPLETED] clindamycin (CLEOCIN) 900 mg in dextrose 5% 50 mL IVPB (premix)  900 mg Intravenous Once Snehal Medina MD   900 mg at 22 0935   • dextrose 5 % and sodium chloride 0.9 % with KCl 20 mEq/L infusion  100 mL/hr Intravenous Continuous Snehal Medina  mL/hr at 22 0422 100 mL/hr at 22 0422   • Enoxaparin Sodium (LOVENOX) syringe 30 mg  30 mg Subcutaneous Q24H Snehal Medina MD   30 mg at 22 1148   • [COMPLETED] famotidine (PEPCID) injection 20 mg  20 mg Intravenous Once Mark Davis DO   20 mg at 22 0736   • famotidine (PEPCID) injection 20 mg  20 mg Intravenous Q12H Snehal Medina MD   20 mg at 22   • [COMPLETED] fentaNYL citrate (PF) (SUBLIMAZE)  injection 50 mcg  50 mcg Intravenous Once Mark Davis DO   50 mcg at 22 0736   • [COMPLETED] glycopyrrolate (ROBINUL) injection 0.2 mg  0.2 mg Intravenous 60 Min Pre-Op Reyes, Mirabelle, DO   0.2 mg at 22 0920   • [] HYDROcodone-acetaminophen (NORCO)  MG per tablet 1 tablet  1 tablet Nasogastric Q4H PRN Mitchel Vicente MD       • [] HYDROcodone-acetaminophen (NORCO) 5-325 MG per tablet 1 tablet  1 tablet Nasogastric Q4H PRN Mitchel Vicente MD       • [] HYDROmorphone (DILAUDID) injection 0.5 mg  0.5 mg Intravenous Q2H PRN Vish Vieira MD   0.5 mg at 22 0418    And   • naloxone (NARCAN) injection 0.4 mg  0.4 mg Intravenous Q5 Min PRN Snehal Medina MD       • HYDROmorphone (DILAUDID) injection 0.5 mg  0.5 mg Intravenous Q6H PRN Snehal Medina MD   0.5 mg at 22 0428   • [COMPLETED] iopamidol (ISOVUE-370) 76 % injection 100 mL  100 mL Intravenous Once in imaging Mark Davis DO   100 mL at 22 0812   • [COMPLETED] iopamidol (ISOVUE-370) 76 % injection 100 mL  100 mL Intravenous Once in imaging Mitchel Vicente MD   100 mL at 22 2103   • ipratropium-albuterol (DUO-NEB) nebulizer solution 3 mL  3 mL Nebulization Q4H PRN Snehal Medina MD       • [COMPLETED] lactulose (CHRONULAC) 10 GM/15ML solution 30 g  30 g Oral Once Vish Vieira MD   30 g at 22 0906   • melatonin tablet 5 mg  5 mg Nasogastric Nightly PRN Snehal Medina MD       • [COMPLETED] metoclopramide (REGLAN) injection 10 mg  10 mg Intravenous Q6H Mitchel Vicente MD   10 mg at 22 0848   • [COMPLETED] midazolam (VERSED) injection 1 mg  1 mg Intravenous Once Reyes, Mirabelle, DO   1 mg at 22 0919   • [COMPLETED] ondansetron (ZOFRAN) injection 4 mg  4 mg Intravenous Once Africa Donaldson MD   4 mg at 22 0622   • [COMPLETED] ondansetron (ZOFRAN) injection 4 mg  4 mg Intravenous Once Mark Davis DO   4 mg  at 22 0736   • [COMPLETED] ondansetron (ZOFRAN) injection 4 mg  4 mg Intravenous Once Mark Davis DO   4 mg at 22 0935   • ondansetron (ZOFRAN) injection 4 mg  4 mg Intravenous Q4H PRN Snehal Medina MD   4 mg at 22 0428   • ondansetron (ZOFRAN) injection 4 mg  4 mg Intravenous Q6H PRN Snehal Medina MD       • phenol (CHLORASEPTIC) 1.4 % liquid 1 spray  1 spray Mouth/Throat Q2H PRN Snehal Medina MD   1 spray at 22 1714   • [] piperacillin-tazobactam (ZOSYN) 3.375 g in iso-osmotic dextrose 50 ml (premix)  3.375 g Intravenous On Call Snehal Medina MD       • [COMPLETED] potassium chloride (MICRO-K) CR capsule 40 mEq  40 mEq Oral Once Vish Vieira MD   40 mEq at 22 0906   • [] potassium chloride 20 mEq in 50 mL IVPB  20 mEq Intravenous Q1H Vish Vieira MD 50 mL/hr at 22 1145 20 mEq at 22 1145   • promethazine (PHENERGAN) tablet 12.5 mg  12.5 mg Oral Q6H PRN Snehal Medina MD   12.5 mg at 22 1714   • [COMPLETED] sodium chloride 0.9 % bolus 1,000 mL  1,000 mL Intravenous Once Mark Davis DO   Stopped at 22 0806   • sodium chloride 0.9 % flush 10 mL  10 mL Intravenous PRN Snehal Medina MD       • sodium chloride 0.9 % flush 10 mL  10 mL Intravenous PRN Snehal Medina MD       • sodium chloride 0.9 % flush 10 mL  10 mL Intravenous Q12H Snehal Medina MD   10 mL at 22 2040   • sodium chloride 0.9 % infusion 40 mL  40 mL Intravenous PRN Snehal Medina MD       • sodium chloride 0.9 % infusion  9 mL/hr Intravenous Continuous PRN Snehal Medina MD              Review of Systems  Constitutional:        Weakness tiredness fatigue  Eyes:                       No blurry vision, eye discharge, eye irritation, eye pain  HEENT:                   No acute hair loss, earache and discharge, nasal congestion or discharge, sore throat, postnasal drip  Respiratory:           No  shortness of breath coughing sputum production wheezing hemoptysis pleuritic chest pain  Cardiovascular:     No chest pain, orthopnea, PND, dizziness, palpitation, lower extremity edema  Gastrointestinal:   No nausea vomiting diarrhea abdominal pain constipation  Genitourinary:       No urinary incontinence, hesitancy, frequency, urgency, dysuria  Hematologic:         No bruising, bleeding, pallor, lymphadenopathy  Endocrine:            No coldness, hot flashes, polyuria, abnormal hair growth  Musculoskeletal:    No body pains, aches, arthritic pains, muscle pain ,muscle wasting  Psychiatric:          No low or high mood, anxiety, hallucinations, delusions  Skin.                      No rash, ulcers, bruising, itching  Neurological:        No confusion, headache, focal weakness, numbness, dysphasia    Objective   Objective     Vitals:   Temp:  [97.16 °F (36.2 °C)-98.5 °F (36.9 °C)] (P) 98.2 °F (36.8 °C)  Heart Rate:  [82-92] (P) 92  Resp:  [16-22] (P) 18  BP: (169-182)/(86-94) (P) 178/85  Flow (L/min):  [2] 2  Physical Exam    Constitutional: Awake, alert responsive, conversant, no obvious distress              Psychiatric:  Appropriate affect, cooperative   Neurologic:  Awake alert ,oriented x 3, strength symmetric in all extremities, Cranial Nerves grossly intact to confrontation, speech clear   Eyes:   PERRLA, sclerae anicteric, no conjunctival injection   HEENT:  Moist mucous membranes, no nasal or eye discharge, no throat congestion   Neck:   Supple, no thyromegaly, no lymphadenopathy, trachea midline, no elevated JVD   Respiratory:  Clear to auscultation bilaterally, nonlabored respirations    Cardiovascular: RRR, no murmurs, rubs, or gallops, palpable pedal pulses bilaterally, No bilateral ankle edema   Gastrointestinal: _ve bowel sounds, soft, nontender, nondistended, no organomegaly   Musculoskeletal:  No clubbing or cyanosis to extremities,muscle wasting, joint swelling, muscle weakness             Skin:                       No rashes, bruising, skin ulcers, petechiae or ecchymosis    Result Review    Result Review:  I have personally reviewed the results from the time of this admission to 8/21/2022 08:50 EDT and agree with these findings:  [x]  Laboratory  [x]  Microbiology  [x]  Radiology  []  EKG/Telemetry   []  Cardiology/Vascular   []  Pathology  []  Old records  []  Other:    Assessment & Plan   Assessment / Plan       Active Hospital Problems:  Active Hospital Problems    Diagnosis    • Severe malnutrition (HCC)    • COPD (chronic obstructive pulmonary disease) (HCC)    • Small bowel obstruction (HCC)    • Nicotine use     71 y.o. female With past medical history significant for COPD, gerd, constipation, osteoarthritis came to the emergency room complaining of nausea vomiting abdominal pain,CT of the abdomen and pelvis showed small bowel obstruction. Has prior hx of abdominal surgery and bowel obstruction. NG placed and decompressed with improvement in abdominal pain and removed and again placed 8/16 due to no bowel movements and surgery consulted with aggressive bowel regiment and surgery done 8/19 with extensive lysis of adhesion     Plan:   d5 water with kcl. Will continue today  Avoid narcotic pain medicines as much as possible  Minimal sips and swabs. Not passing flatus and no bowel movements yet

## 2022-08-21 NOTE — PLAN OF CARE
Goal Outcome Evaluation:      VSS. NG tube and castle cath removed today. UOP. Nausea improved s/p NG removal. Pain controlled per patient; see MAR. Up in chair today; tolerated well. Mariangel Verdugo, RNA        Progress: improving

## 2022-08-21 NOTE — PLAN OF CARE
Goal Outcome Evaluation:  Plan of Care Reviewed With: patient, family        Progress: no change  Outcome Evaluation: B/p elevated.  pt still with significant pain; nausea at times d/t ngt in place.  Medicated per MAR.  castle cath still in place; family at bedside.  Pt did ambulate around the unit last night and tolerated well.

## 2022-08-21 NOTE — PROGRESS NOTES
LOS: 10 days   Patient Care Team:  Eduard Minaya MD as PCP - General (Internal Medicine)      Subjective     Interval History:   Doing well, minimal ngt output and she feels it is really making her uncomfortable and gagging and would like it out, no further n/v, no gi function yet, abdomen very soft    Objective     Vital Signs  Temp:  [97.16 °F (36.2 °C)-98.5 °F (36.9 °C)] 98.5 °F (36.9 °C)  Heart Rate:  [82-92] 86  Resp:  [16-22] 16  BP: (169-182)/(86-94) 179/92    Physical Exam:  NAD  Abd soft, nd, inc ok      Results Review:     I reviewed the patient's new clinical results.      Assessment & Plan       Nicotine use    Small bowel obstruction (HCC)    COPD (chronic obstructive pulmonary disease) (HCC)    Severe malnutrition (HCC)        Plan:  Dc castle  Dc ngt  Minimal sips and swabs   Await gi function  OOB and ambulate

## 2022-08-22 PROBLEM — Z98.890 S/P EXPLORATORY LAPAROTOMY: Status: ACTIVE | Noted: 2022-08-22

## 2022-08-22 LAB
ANION GAP SERPL CALCULATED.3IONS-SCNC: 8.4 MMOL/L (ref 5–15)
BASOPHILS # BLD AUTO: 0.03 10*3/MM3 (ref 0–0.2)
BASOPHILS NFR BLD AUTO: 0.3 % (ref 0–1.5)
BUN SERPL-MCNC: 4 MG/DL (ref 8–23)
BUN/CREAT SERPL: 6.8 (ref 7–25)
CALCIUM SPEC-SCNC: 8.7 MG/DL (ref 8.6–10.5)
CHLORIDE SERPL-SCNC: 104 MMOL/L (ref 98–107)
CO2 SERPL-SCNC: 24.6 MMOL/L (ref 22–29)
CREAT SERPL-MCNC: 0.59 MG/DL (ref 0.57–1)
DEPRECATED RDW RBC AUTO: 43.3 FL (ref 37–54)
EGFRCR SERPLBLD CKD-EPI 2021: 97.7 ML/MIN/1.73
EOSINOPHIL # BLD AUTO: 0.18 10*3/MM3 (ref 0–0.4)
EOSINOPHIL NFR BLD AUTO: 2 % (ref 0.3–6.2)
ERYTHROCYTE [DISTWIDTH] IN BLOOD BY AUTOMATED COUNT: 12 % (ref 12.3–15.4)
GLUCOSE SERPL-MCNC: 118 MG/DL (ref 65–99)
HCT VFR BLD AUTO: 33.5 % (ref 34–46.6)
HGB BLD-MCNC: 11.2 G/DL (ref 12–15.9)
IMM GRANULOCYTES # BLD AUTO: 0.05 10*3/MM3 (ref 0–0.05)
IMM GRANULOCYTES NFR BLD AUTO: 0.6 % (ref 0–0.5)
LYMPHOCYTES # BLD AUTO: 1.05 10*3/MM3 (ref 0.7–3.1)
LYMPHOCYTES NFR BLD AUTO: 11.9 % (ref 19.6–45.3)
MCH RBC QN AUTO: 32.8 PG (ref 26.6–33)
MCHC RBC AUTO-ENTMCNC: 33.4 G/DL (ref 31.5–35.7)
MCV RBC AUTO: 98.2 FL (ref 79–97)
MONOCYTES # BLD AUTO: 0.78 10*3/MM3 (ref 0.1–0.9)
MONOCYTES NFR BLD AUTO: 8.8 % (ref 5–12)
NEUTROPHILS NFR BLD AUTO: 6.74 10*3/MM3 (ref 1.7–7)
NEUTROPHILS NFR BLD AUTO: 76.4 % (ref 42.7–76)
NRBC BLD AUTO-RTO: 0 /100 WBC (ref 0–0.2)
PLATELET # BLD AUTO: 339 10*3/MM3 (ref 140–450)
PMV BLD AUTO: 8.6 FL (ref 6–12)
POTASSIUM SERPL-SCNC: 3.9 MMOL/L (ref 3.5–5.2)
RBC # BLD AUTO: 3.41 10*6/MM3 (ref 3.77–5.28)
SODIUM SERPL-SCNC: 137 MMOL/L (ref 136–145)
WBC NRBC COR # BLD: 8.83 10*3/MM3 (ref 3.4–10.8)

## 2022-08-22 PROCEDURE — 25010000002 ENOXAPARIN PER 10 MG: Performed by: SURGERY

## 2022-08-22 PROCEDURE — 97161 PT EVAL LOW COMPLEX 20 MIN: CPT

## 2022-08-22 PROCEDURE — 85025 COMPLETE CBC W/AUTO DIFF WBC: CPT | Performed by: SURGERY

## 2022-08-22 PROCEDURE — 80048 BASIC METABOLIC PNL TOTAL CA: CPT | Performed by: SURGERY

## 2022-08-22 PROCEDURE — 99024 POSTOP FOLLOW-UP VISIT: CPT | Performed by: NURSE PRACTITIONER

## 2022-08-22 PROCEDURE — 25010000002 ONDANSETRON PER 1 MG: Performed by: SURGERY

## 2022-08-22 PROCEDURE — 25010000002 HYDROMORPHONE 1 MG/ML SOLUTION: Performed by: SURGERY

## 2022-08-22 RX ORDER — PROMETHAZINE HYDROCHLORIDE 12.5 MG/1
12.5 TABLET ORAL EVERY 6 HOURS PRN
Status: DISCONTINUED | OUTPATIENT
Start: 2022-08-22 | End: 2022-08-23

## 2022-08-22 RX ADMIN — FAMOTIDINE 20 MG: 10 INJECTION INTRAVENOUS at 20:48

## 2022-08-22 RX ADMIN — ONDANSETRON 4 MG: 2 INJECTION INTRAMUSCULAR; INTRAVENOUS at 16:27

## 2022-08-22 RX ADMIN — Medication 10 ML: at 20:48

## 2022-08-22 RX ADMIN — ENOXAPARIN SODIUM 30 MG: 100 INJECTION SUBCUTANEOUS at 13:39

## 2022-08-22 RX ADMIN — FAMOTIDINE 20 MG: 10 INJECTION INTRAVENOUS at 09:53

## 2022-08-22 RX ADMIN — HYDROMORPHONE HYDROCHLORIDE 0.5 MG: 1 INJECTION, SOLUTION INTRAMUSCULAR; INTRAVENOUS; SUBCUTANEOUS at 16:31

## 2022-08-22 RX ADMIN — Medication 10 ML: at 09:53

## 2022-08-22 RX ADMIN — HYDROMORPHONE HYDROCHLORIDE 0.5 MG: 1 INJECTION, SOLUTION INTRAMUSCULAR; INTRAVENOUS; SUBCUTANEOUS at 02:55

## 2022-08-22 NOTE — THERAPY EVALUATION
Acute Care - Physical Therapy Initial Evaluation   [place]     Patient Name: Fanta Hawkins  : 1950  MRN: [id number]  Today's Date: 2022      Visit Dx:     ICD-10-CM ICD-9-CM   1. Small bowel obstruction (HCC)  K56.609 560.9   2. Difficulty walking  R26.2 719.7     Patient Active Problem List   Diagnosis   • Nicotine use   • Chest pain   • Shortness of breath   • Small bowel obstruction (HCC)   • COPD (chronic obstructive pulmonary disease) (HCC)   • Severe malnutrition (HCC)   • S/P exploratory laparotomy with lysis of adhesions     Past Medical History:   Diagnosis Date   • Arthritis    • Asthma    • COPD (chronic obstructive pulmonary disease) (HCC) 8/15/2022   • Injury of back    • Mood disorder (HCC)    • S/P exploratory laparotomy with lysis of adhesions 2022     Past Surgical History:   Procedure Laterality Date   • BACK SURGERY     • COLONOSCOPY     • EXPLORATORY LAPAROTOMY N/A 2012    repair of colon perforation after colonoscopy   • EXPLORATORY LAPAROTOMY N/A 2022    Procedure: LAPAROTOMY EXPLORATORY;  Surgeon: Snehal Medina MD;  Location: [facility] [place];  Service: General;  Laterality: N/A;   • EYE SURGERY     • HYSTERECTOMY       PT Assessment (last 12 hours)     PT Evaluation and Treatment     Row Name 22 1100          Physical Therapy Time and Intention    Subjective Information no complaints  -DP     Document Type evaluation  -DP     Mode of Treatment individual therapy;physical therapy  -DP     Patient Effort good  -DP     Row Name 22 1100          General Information    Patient Profile Reviewed yes  -DP     Patient Observations alert;cooperative;agree to therapy  -DP     Prior Level of Function independent:;gait;transfer;bed mobility;ADL's  -DP     Row Name 22 1100          Living Environment    Current Living Arrangements home  -DP     Home Accessibility stairs to enter home  -DP     People in Home alone  -DP     Primary Care Provided by self   -DP     Row Name 08/22/22 1100          Home Main Entrance    Number of Stairs, Main Entrance two  -DP     Row Name 08/22/22 1100          Range of Motion (ROM)    Range of Motion bilateral lower extremities;ROM is WFL  -DP     Row Name 08/22/22 1100          Strength (Manual Muscle Testing)    Strength (Manual Muscle Testing) bilateral lower extremities;strength is WFL  -DP     Row Name 08/22/22 1100          Bed Mobility    Bed Mobility sit-supine  -DP     Supine-Sit [place] (Bed Mobility) modified independence  -DP     Row Name 08/22/22 1100          Transfers    Transfers sit-stand transfer  -DP     Sit-Stand [place] (Transfers) modified independence  -DP     Row Name 08/22/22 1100          Gait/Stairs (Locomotion)    Gait/Stairs Locomotion gait/ambulation independence  -DP     [place] Level (Gait) contact guard  -DP     Distance in Feet (Gait) 350  -DP     Row Name 08/22/22 1100          Balance    Balance Assessment standing dynamic balance  -DP     Dynamic Standing Balance standby assist  -DP     Position/Device Used, Standing Balance unsupported  -DP     Row Name             Wound 08/19/22 0956 midline abdomen    Wound - Properties Group Placement Date: 08/19/22  -NITIN Placement Time: 0956 -NITIN Orientation: midline  -NITIN Location: abdomen  -NITIN     Retired Wound - Properties Group Placement Date: 08/19/22  -NITIN Placement Time: 0956 -NITIN Orientation: midline  -NITIN Location: abdomen  -NITIN     Retired Wound - Properties Group Date first assessed: 08/19/22  -NITIN Time first assessed: 0956 - Location: abdomen  -NITIN     Row Name 08/22/22 1100          Plan of Care Review    Plan of Care Reviewed With patient  -DP     Outcome Evaluation Patient performed transfers and ambulation in hallway this day safely and displayed steadiness on her feet. Patient does not require skilled PT services at this time and is safe to return home post discharge from hospital.  -DP     Row Name 08/22/22 1100          Therapy  Assessment/Plan (PT)    Criteria for Skilled Interventions Met (PT) no problems identified which require skilled intervention  -DP     Therapy Frequency (PT) evaluation only  -DP     Row Name 08/22/22 1100          PT Evaluation Complexity    History, PT Evaluation Complexity no personal factors and/or comorbidities  -DP     Examination of Body Systems (PT Eval Complexity) 1-2 elements  -DP     Clinical Presentation (PT Evaluation Complexity) stable  -DP     Clinical Decision Making (PT Evaluation Complexity) low complexity  -DP     Overall Complexity (PT Evaluation Complexity) low complexity  -DP           User Key  (r) = Recorded By, (t) = Taken By, (c) = Cosigned By    Initials Name Provider Type    NITIN Lisa Armstrong, RN Registered Nurse    Franklyn Rubin, PT Physical Therapist                Physical Therapy Education                 Title: PT OT SLP Therapies (Done)     Topic: Physical Therapy (Done)     Point: Mobility training (Done)     Learning Progress Summary           Patient Acceptance, E, VU by  at 8/16/2022 1149                   Point: Home exercise program (Done)     Learning Progress Summary           Patient Acceptance, E, VU by  at 8/16/2022 1149                   Point: Body mechanics (Done)     Learning Progress Summary           Patient Acceptance, E, VU by  at 8/16/2022 1149                   Point: Precautions (Done)     Learning Progress Summary           Patient Acceptance, E, VU by  at 8/16/2022 1149                               User Key     Initials Effective Dates Name Provider Type [facility] 06/06/22 -  Franko Adorno, PT Student PT Student PT              PT Recommendation and Plan  Anticipated Discharge Disposition (PT): home with assist  Therapy Frequency (PT): evaluation only  Plan of Care Reviewed With: patient  Outcome Evaluation: Patient performed transfers and ambulation in hallway this day safely and displayed steadiness on her feet. Patient does not  require skilled PT services at this time and is safe to return home post discharge from hospital.   Outcome Measures     Row Name 08/22/22 1100             How much help from another person do you currently need...    Turning from your back to your side while in flat bed without using bedrails? 3  -DP      Moving from lying on back to sitting on the side of a flat bed without bedrails? 3  -DP      Moving to and from a bed to a chair (including a wheelchair)? 4  -DP      Standing up from a chair using your arms (e.g., wheelchair, bedside chair)? 4  -DP      Climbing 3-5 steps with a railing? 3  -DP      To walk in hospital room? 4  -DP      AM-PAC 6 Clicks Score (PT) 21  -DP              Functional Assessment    Outcome Measure Options AM-PAC 6 Clicks Basic Mobility (PT)  -DP            User Key  (r) = Recorded By, (t) = Taken By, (c) = Cosigned By    Initials Name Provider Type    Franklyn Rubin, PT Physical Therapist                 Time Calculation:    PT Charges     Row Name 08/22/22 1107             Time Calculation    PT Received On 08/22/22  -DP              Untimed Charges    PT Eval/Re-eval Minutes 40  -DP              Total Minutes    Untimed Charges Total Minutes 40  -DP       Total Minutes 40  -DP            User Key  (r) = Recorded By, (t) = Taken By, (c) = Cosigned By    Initials Name Provider Type    Franklyn Rubin, PT Physical Therapist              Therapy Charges for Today     Code Description Service Date Service Provider Modifiers Qty    85621704385 HC PT EVAL LOW COMPLEXITY 3 8/22/2022 Franklyn Gonzalez, PT GP 1          PT G-Codes  Outcome Measure Options: AM-PAC 6 Clicks Basic Mobility (PT)  AM-PAC 6 Clicks Score (PT): 21    Franklyn Gonzalez, PT  8/22/2022

## 2022-08-22 NOTE — PROGRESS NOTES
POST OP PROGRESS NOTE     Patient Name:  Fanta Hawkins  YOB: 1950  [id number]   LOS: 11 days   3 Days Post-Op  Patient Care Team:  Eduard Minaya MD as PCP - General (Internal Medicine)        Subjective     Interval History:  VSS, afebrile, no n/v.  No BM.  Pain controlled.  WBC 8 today, 13 yesterday.       Review of Systems:    All complete review of systems was performed and all are negative except what is documented in the HPI.       Objective     Vital Signs  Temp:  [97.9 °F (36.6 °C)-98.6 °F (37 °C)] 98.2 °F (36.8 °C)  Heart Rate:  [77-98] 83  Resp:  [16-18] 18  BP: (155-177)/(80-97) 155/80    Physical Exam:     General Appearance:   Alert, cooperative, in no acute distress   Head:   Normocephalic, without obvious abnormality, atraumatic   Eyes:            Lids and lashes normal, conjunctivae and sclerae normal, no     Icterus    Ears:   Ears appear intact with no abnormalities noted   Throat:  No oral lesions, oral mucosa moist  Neck: supple, trachea midline    Lungs:    Respirations regular, even and unlabored    Heart:   Regular rhythm and normal rate   Chest Wall:   No abnormalities observed   Abdomen:    no masses, no organomegaly, soft appropriately tender, midline incision with staples intact,  non-distended, no guarding   Extremities:  Moves all extremities well, no edema, no cyanosis, no               redness   Skin:  No bleeding, bruising or rash   Neurologic:  A/Ox3 with no deficits       Results Review:     I reviewed the patient's new clinical results including CBC and BMP.     LABS:  Lab Results (last 72 hours)     Procedure Component Value Units Date/Time    Basic Metabolic Panel [[id number]]  (Abnormal) Collected: 08/22/22 0545    Specimen: Blood Updated: 08/22/22 0709     Glucose 118 mg/dL      BUN 4 mg/dL      Creatinine 0.59 mg/dL      Sodium 137 mmol/L      Potassium 3.9 mmol/L      Chloride 104 mmol/L      CO2 24.6 mmol/L      Calcium 8.7 mg/dL      BUN/Creatinine  Ratio 6.8     Anion Gap 8.4 mmol/L      eGFR 97.7 mL/min/1.73      Comment: National Kidney Foundation and American Society of Nephrology (ASN) Task Force recommended calculation based on the Chronic Kidney Disease Epidemiology Collaboration (CKD-EPI) equation refit without adjustment for race.       Narrative:      GFR Normal >60  Chronic Kidney Disease <60  Kidney Failure <15      CBC & Differential [[id number]]  (Abnormal) Collected: 08/22/22 0545    Specimen: Blood Updated: 08/22/22 0644    Narrative:      The following orders were created for panel order CBC & Differential.  Procedure                               Abnormality         Status                     ---------                               -----------         ------                     CBC Auto Differential[[id number]]        Abnormal            Final result                 Please view results for these tests on the individual orders.    CBC Auto Differential [[id number]]  (Abnormal) Collected: 08/22/22 0545    Specimen: Blood Updated: 08/22/22 0644     WBC 8.83 10*3/mm3      RBC 3.41 10*6/mm3      Hemoglobin 11.2 g/dL      Hematocrit 33.5 %      MCV 98.2 fL      MCH 32.8 pg      MCHC 33.4 g/dL      RDW 12.0 %      RDW-SD 43.3 fl      MPV 8.6 fL      Platelets 339 10*3/mm3      Neutrophil % 76.4 %      Lymphocyte % 11.9 %      Monocyte % 8.8 %      Eosinophil % 2.0 %      Basophil % 0.3 %      Immature Grans % 0.6 %      Neutrophils, Absolute 6.74 10*3/mm3      Lymphocytes, Absolute 1.05 10*3/mm3      Monocytes, Absolute 0.78 10*3/mm3      Eosinophils, Absolute 0.18 10*3/mm3      Basophils, Absolute 0.03 10*3/mm3      Immature Grans, Absolute 0.05 10*3/mm3      nRBC 0.0 /100 WBC     Comprehensive Metabolic Panel [[id number]]  (Abnormal) Collected: 08/20/22 0416    Specimen: Blood Updated: 08/20/22 0621     Glucose 180 mg/dL      BUN 9 mg/dL      Creatinine 0.75 mg/dL      Sodium 138 mmol/L      Potassium 4.3 mmol/L      Chloride 103 mmol/L      CO2 23.8  mmol/L      Calcium 7.9 mg/dL      Total Protein 5.6 g/dL      Albumin 2.90 g/dL      ALT (SGPT) 16 U/L      AST (SGOT) 20 U/L      Alkaline Phosphatase 64 U/L      Total Bilirubin 0.3 mg/dL      Globulin 2.7 gm/dL      A/G Ratio 1.1 g/dL      BUN/Creatinine Ratio 12.0     Anion Gap 11.2 mmol/L      eGFR 86.3 mL/min/1.73      Comment: National Kidney Foundation and American Society of Nephrology (ASN) Task Force recommended calculation based on the Chronic Kidney Disease Epidemiology Collaboration (CKD-EPI) equation refit without adjustment for race.       Narrative:      GFR Normal >60  Chronic Kidney Disease <60  Kidney Failure <15      Phosphorus [[id number]]  (Normal) Collected: 08/20/22 0416    Specimen: Blood Updated: 08/20/22 0618     Phosphorus 2.8 mg/dL     Magnesium [[id number]]  (Normal) Collected: 08/20/22 0416    Specimen: Blood Updated: 08/20/22 0618     Magnesium 1.6 mg/dL     CBC & Differential [[id number]]  (Abnormal) Collected: 08/20/22 0416    Specimen: Blood Updated: 08/20/22 0546    Narrative:      The following orders were created for panel order CBC & Differential.  Procedure                               Abnormality         Status                     ---------                               -----------         ------                     CBC Auto Differential[[id number]]        Abnormal            Final result                 Please view results for these tests on the individual orders.    CBC Auto Differential [[id number]]  (Abnormal) Collected: 08/20/22 0416    Specimen: Blood Updated: 08/20/22 0546     WBC 13.43 10*3/mm3      RBC 3.89 10*6/mm3      Hemoglobin 13.0 g/dL      Hematocrit 39.1 %      .5 fL      MCH 33.4 pg      MCHC 33.2 g/dL      RDW 12.4 %      RDW-SD 45.7 fl      MPV 9.1 fL      Platelets 368 10*3/mm3      Neutrophil % 85.0 %      Lymphocyte % 5.4 %      Monocyte % 8.8 %      Eosinophil % 0.0 %      Basophil % 0.1 %      Immature Grans % 0.7 %      Neutrophils, Absolute  11.42 10*3/mm3      Lymphocytes, Absolute 0.72 10*3/mm3      Monocytes, Absolute 1.18 10*3/mm3      Eosinophils, Absolute 0.00 10*3/mm3      Basophils, Absolute 0.01 10*3/mm3      Immature Grans, Absolute 0.10 10*3/mm3      nRBC 0.0 /100 WBC           IMAGING:  Imaging Results (Last 72 Hours)     ** No results found for the last 72 hours. **          Medications:    Current Facility-Administered Medications:   •  acetaminophen (TYLENOL) tablet 650 mg, 650 mg, Nasogastric, Q4H PRN **OR** acetaminophen (TYLENOL) 160 MG/5ML solution 650 mg, 650 mg, Nasogastric, Q4H PRN **OR** acetaminophen (TYLENOL) suppository 650 mg, 650 mg, Rectal, Q4H PRN, Snehal Medina MD  •  aluminum-magnesium hydroxide-simethicone (MAALOX MAX) 400-400-40 MG/5ML suspension 15 mL, 15 mL, Nasogastric, Q6H PRN, Snehal Medina MD  •  dextrose 5 % and sodium chloride 0.9 % with KCl 20 mEq/L infusion, 75 mL/hr, Intravenous, Continuous, Ruchi Villarreal APRN, Last Rate: 100 mL/hr at 22, 100 mL/hr at 22  •  Enoxaparin Sodium (LOVENOX) syringe 30 mg, 30 mg, Subcutaneous, Q24H, Snehal Medina MD, 30 mg at 22 1203  •  famotidine (PEPCID) injection 20 mg, 20 mg, Intravenous, Q12H, Snehal Medina MD, 20 mg at 22  •  HYDROmorphone (DILAUDID) injection 0.5 mg, 0.5 mg, Intravenous, Q6H PRN, Snehal Medina MD, 0.5 mg at 22 0255  •  ipratropium-albuterol (DUO-NEB) nebulizer solution 3 mL, 3 mL, Nebulization, Q4H PRN, Snehal Medina MD  •  melatonin tablet 5 mg, 5 mg, Nasogastric, Nightly PRN, Snehal Medina MD  •  [] HYDROmorphone (DILAUDID) injection 0.5 mg, 0.5 mg, Intravenous, Q2H PRN, 0.5 mg at 228 **AND** naloxone (NARCAN) injection 0.4 mg, 0.4 mg, Intravenous, Q5 Min PRN, Snehal Medina MD  •  ondansetron (ZOFRAN) injection 4 mg, 4 mg, Intravenous, Q4H PRN, Snehal Medina MD, 4 mg at 228  •  ondansetron (ZOFRAN) injection 4 mg, 4  mg, Intravenous, Q6H PRN, Snehal Medina MD  •  phenol (CHLORASEPTIC) 1.4 % liquid 1 spray, 1 spray, Mouth/Throat, Q2H PRN, Snehal Medina MD, 1 spray at 08/20/22 1714  •  promethazine (PHENERGAN) tablet 12.5 mg, 12.5 mg, Oral, Q6H PRN, Snehal Medina MD, 12.5 mg at 08/20/22 1714  •  sodium chloride 0.9 % flush 10 mL, 10 mL, Intravenous, PRN, Snehal Medina MD  •  sodium chloride 0.9 % flush 10 mL, 10 mL, Intravenous, PRN, Snehal Medina MD  •  sodium chloride 0.9 % flush 10 mL, 10 mL, Intravenous, Q12H, Snehal Medina MD, 10 mL at 08/21/22 2048  •  sodium chloride 0.9 % infusion 40 mL, 40 mL, Intravenous, PRN, Snehal Medina MD  •  sodium chloride 0.9 % infusion, 9 mL/hr, Intravenous, Continuous PRN, Snehal Medina MD    Assessment & Plan       Nicotine use    Small bowel obstruction (HCC)    COPD (chronic obstructive pulmonary disease) (HCC)    Severe malnutrition (HCC)  S/P ex lap with extensive lysis of adhesions.    -cont NPO with sips of water today   -ambulate in hallway and up in chair   -CBC and BMP in AM     JIN Schmidt  08/22/22  09:50 EDT    Electronically signed by JIN Schmidt, 08/22/22, 9:50 AM EDT.

## 2022-08-22 NOTE — PLAN OF CARE
Goal Outcome Evaluation:  Plan of Care Reviewed With: patient        Progress: improving     Patient with mild complaints of nausea and pain throughout the shift. Relief experienced with PRN medication. Ambulated multiple times throughout the shift with walker and standby assistance.   Left forearm IV infiltrated early afternoon. Mild edema, erythema, and tenderness noted. MD Minaya aware. Patient resting in bed with family at bedside. Bed in lowest locked position with call light and tray table within reach.

## 2022-08-22 NOTE — PLAN OF CARE
Goal Outcome Evaluation:  Plan of Care Reviewed With: patient, family        Progress: improving  Outcome Evaluation: B/p remains elevated.  pain controlled with iv dilaudid prn.  pt ambulated around the nsg unit with walker and one person assist, and tolerated well.  still npo and states has not passed gas.

## 2022-08-22 NOTE — PLAN OF CARE
Goal Outcome Evaluation:  Plan of Care Reviewed With: patient           Outcome Evaluation: Patient performed transfers and ambulation in hallway this day safely and displayed steadiness on her feet. Patient does not require skilled PT services at this time and is safe to return home post discharge from hospital.

## 2022-08-22 NOTE — PROGRESS NOTES
[facility]     Progress Note    Patient Name: Fanta Hawkins  : 1950  MRN: [id number]  Primary Care Physician:  Eduard iMnaya MD  Date of admission: 2022  6:15 AM       Subjective     AAOx3.  Continues to have no flatus or BM.  She is walking frequently.  States she is hungry.    Review of Systems:    Review of Systems   Constitutional: Positive for activity change, appetite change and fatigue. Negative for chills and fever.   Cardiovascular: Positive for leg swelling.   Gastrointestinal:        Abdominal soreness    Neurological: Positive for weakness.   All other systems reviewed and are negative.          Objective   Objective     Vitals:   Vitals:    22 2304 22 0253 22 0531 22 0734   BP: 172/88 177/86  155/80   BP Location: Right arm Right arm     Patient Position: Lying Lying  Lying   Pulse: 77 98  83   Resp: 16 18  18   Temp: 98.4 °F (36.9 °C) 98 °F (36.7 °C)  98.2 °F (36.8 °C)   TempSrc: Oral Oral  Oral   SpO2: 96% 98%  97%   Weight:   45.8 kg (100 lb 15.5 oz)    Height:              Physical Exam:  Vitals and nursing note reviewed.     Constitutional:       Alert, cooperative, responsive, and conversant.  No acute distress.     HENT:      Normocephalic and atraumatic, no nasal congestion, discharge, mucous membranes are moist, no OP erythema  Eyes:      PERRLA, no scleral icterus, no conjunctival injection, no eye discharge  Neck:     Supple, no thyromegaly, no lymphadenopathy, trachea midline, no elevated JVD  Cardiovascular:      RRR, no murmurs, rubs or gallops. Palpable pedal pulses bilaterally. Trace-1+ BLE.   Pulmonary:      CTAB. Pulmonary effort is normal and unlabored. No respiratory distress.    Abdominal:      Bowel sounds active, soft, tender, mildly distended, no organomegaly  Musculoskeletal:         + muscle wasting, No tenderness or joint swelling. Generalized weakness.  Skin:     Warm and dry, cap refill less than 3 seconds. No clubbing,  cyanosis, jaundice, erythema, rashes or ecchymosis. Incision clean, approximated, staples intact.   Neurological:      AAOx3, speech clear, no focal deficit, strength equal bilaterally in all extremities, cranial nerves grossly intact  Psychiatric:         Mood and affect normal.  Behavior normal.         Result Review    Result Review:  I have personally reviewed the results from the time of this admission to 9/20/2021 18:13 EDT and agree with these findings:  []  Laboratory  []  Microbiology  []  Radiology  []  EKG/Telemetry   []  Cardiology/Vascular   []  Pathology  []  Old records  []  Other:     Assessment/Plan   Assessment / Plan       Active Hospital Problems:  Active Hospital Problems    Diagnosis    • Severe malnutrition (HCC)    • COPD (chronic obstructive pulmonary disease) (HCC)    • Small bowel obstruction (HCC)    • Nicotine use          Plan:   Continue plan per surgery  Continue ambulation  PT  Decrease IVFs  I have personally reviewed all the data and agree with the plan  Electronically signed by JIN Eduardo, 08/22/22, 8:28 AM EDT.

## 2022-08-22 NOTE — H&P
[facility]   HISTORY AND PHYSICAL    Patient Name: Fanta Hawkins  : 1950  MRN: [id number]  Primary Care Physician:  Eduard Minaya MD  Date of admission: 2022    Subjective   Subjective     Chief Complaint:   Abdominal pain      HPI:    Fanta Hawkins is a 71 y.o. female admitted to hospital for nausea vomiting and abdominal pain, work-up revealed small bowel obstruction.  Patient was conservatively managed for several days and had surgery on  adhesions intra-abdominally, patient underwent lysis of additions, since then patient has felt better no nausea vomiting abdominal pain but no BM.  Patient wanted to switch primary care doctor and it was consulted to take over the care by Dr. Vieira.      Review of Systems:  Fatigue  No nausea vomiting  No abdominal pain  No fever    Personal History     Past Medical History:   Diagnosis Date   • Arthritis    • Asthma    • COPD (chronic obstructive pulmonary disease) ([facility]) 8/15/2022   • Injury of back    • Mood disorder ([facility])    • S/P exploratory laparotomy with lysis of adhesions 2022       Past Surgical History:   Procedure Laterality Date   • BACK SURGERY     • COLONOSCOPY     • EXPLORATORY LAPAROTOMY N/A 2012    repair of colon perforation after colonoscopy   • EXPLORATORY LAPAROTOMY N/A 2022    Procedure: LAPAROTOMY EXPLORATORY;  Surgeon: Snehal Medina MD;  Location: [facility] [place]  Service: General;  Laterality: N/A;   • EYE SURGERY     • HYSTERECTOMY         Family History: family history is not on file. Otherwise pertinent FHx was reviewed and not pertinent to current issue.    Social History:  reports that she has been smoking cigarettes. She has been smoking about 0.25 packs per day. She has never used smokeless tobacco. She reports that she does not drink alcohol and does not use drugs.    Home Medications:  HYDROcodone-acetaminophen, albuterol sulfate HFA, diclofenac, docusate sodium, fluticasone, and  omeprazole      Allergies:  Allergies   Allergen Reactions   • Indomethacin Palpitations and Shortness Of Breath   • Meloxicam Itching and Shortness Of Breath   • Avelox [Moxifloxacin] Seizure   • Morphine Hives and Itching       Objective   Objective     Vitals:   Temp:  [98 °F (36.7 °C)-98.8 °F (37.1 °C)] 98.8 °F (37.1 °C)  Heart Rate:  [77-98] 87  Resp:  [16-18] 18  BP: (155-177)/(80-90) 164/90    Physical Exam    Elderly thinly built female not in acute distress  No icterus or pallor  Heart regular  Lungs clear  Abdomen soft, nontender  Extremities no edema      I have personally reviewed the results from the time of this admission to 8/22/2022 17:42 EDT and agree with these findings:  [x]  Laboratory  []  Microbiology  []  Radiology  []  EKG/Telemetry   []  Cardiology/Vascular   []  Pathology  []  Old records  []  Other:    CBC:    WBC   Date Value Ref Range Status   08/22/2022 8.83 3.40 - 10.80 10*3/mm3 Final     RBC   Date Value Ref Range Status   08/22/2022 3.41 (L) 3.77 - 5.28 10*6/mm3 Final     Hemoglobin   Date Value Ref Range Status   08/22/2022 11.2 (L) 12.0 - 15.9 g/dL Final     Hematocrit   Date Value Ref Range Status   08/22/2022 33.5 (L) 34.0 - 46.6 % Final     MCV   Date Value Ref Range Status   08/22/2022 98.2 (H) 79.0 - 97.0 fL Final     MCH   Date Value Ref Range Status   08/22/2022 32.8 26.6 - 33.0 pg Final     MCHC   Date Value Ref Range Status   08/22/2022 33.4 31.5 - 35.7 g/dL Final     RDW   Date Value Ref Range Status   08/22/2022 12.0 (L) 12.3 - 15.4 % Final     RDW-SD   Date Value Ref Range Status   08/22/2022 43.3 37.0 - 54.0 fl Final     MPV   Date Value Ref Range Status   08/22/2022 8.6 6.0 - 12.0 fL Final     Platelets   Date Value Ref Range Status   08/22/2022 339 140 - 450 10*3/mm3 Final     Neutrophil %   Date Value Ref Range Status   08/22/2022 76.4 (H) 42.7 - 76.0 % Final     Lymphocyte %   Date Value Ref Range Status   08/22/2022 11.9 (L) 19.6 - 45.3 % Final     Monocyte %    Date Value Ref Range Status   08/22/2022 8.8 5.0 - 12.0 % Final     Eosinophil %   Date Value Ref Range Status   08/22/2022 2.0 0.3 - 6.2 % Final     Basophil %   Date Value Ref Range Status   08/22/2022 0.3 0.0 - 1.5 % Final     Immature Grans %   Date Value Ref Range Status   08/22/2022 0.6 (H) 0.0 - 0.5 % Final     Neutrophils, Absolute   Date Value Ref Range Status   08/22/2022 6.74 1.70 - 7.00 10*3/mm3 Final     Lymphocytes, Absolute   Date Value Ref Range Status   08/22/2022 1.05 0.70 - 3.10 10*3/mm3 Final     Monocytes, Absolute   Date Value Ref Range Status   08/22/2022 0.78 0.10 - 0.90 10*3/mm3 Final     Eosinophils, Absolute   Date Value Ref Range Status   08/22/2022 0.18 0.00 - 0.40 10*3/mm3 Final     Basophils, Absolute   Date Value Ref Range Status   08/22/2022 0.03 0.00 - 0.20 10*3/mm3 Final     Immature Grans, Absolute   Date Value Ref Range Status   08/22/2022 0.05 0.00 - 0.05 10*3/mm3 Final     nRBC   Date Value Ref Range Status   08/22/2022 0.0 0.0 - 0.2 /100 WBC Final        BMP:    Lab Results   Component Value Date    GLUCOSE 118 (H) 08/22/2022    BUN 4 (L) 08/22/2022    CREATININE 0.59 08/22/2022    BCR 6.8 (L) 08/22/2022    K 3.9 08/22/2022    CO2 24.6 08/22/2022    CALCIUM 8.7 08/22/2022    ALBUMIN 2.90 (L) 08/20/2022    LABIL2 1.1 (L) 05/09/2019    AST 20 08/20/2022    ALT 16 08/20/2022        No radiology results for the last day           Assessment & Plan   Assessment / Plan       Current Diagnosis:  Active Hospital Problems    Diagnosis    • S/P exploratory laparotomy with lysis of adhesions    • Severe malnutrition (HCC)    • COPD (chronic obstructive pulmonary disease) (HCC)    • Small bowel obstruction (HCC)    • Nicotine use      Plan:   Stable  Waiting for return of bowel function  Still n.p.o.  Check mag and phosphorus level along with thyroid  Continue fluids and increase activity  Discussed with patient and  at bedside    DVT prophylaxis:  Medical DVT prophylaxis orders  are present.    GI Prophylaxis:    Pepcid    CODE STATUS:    Code Status (Patient has no pulse and is not breathing): CPR (Attempt to Resuscitate)  Medical Interventions (Patient has pulse or is breathing): Full Support    Admission Status:  I believe this patient meets inpatient status.             I have dictated this note utilizing Dragon Dictation.             Please note that portions of this note were completed with a voice recognition program.             Part of this note may be an electronic transcription/translation of spoken language to printed text         using the Dragon Dictation System.       Electronically signed by Valentin Minaya MD, 08/22/22, 5:42 PM EDT.    Total time spent with in evaluation and management:

## 2022-08-23 LAB
ALBUMIN SERPL-MCNC: 2.7 G/DL (ref 3.5–5.2)
ALBUMIN/GLOB SERPL: 0.9 G/DL
ALP SERPL-CCNC: 59 U/L (ref 39–117)
ALT SERPL W P-5'-P-CCNC: 9 U/L (ref 1–33)
ANION GAP SERPL CALCULATED.3IONS-SCNC: 9.7 MMOL/L (ref 5–15)
AST SERPL-CCNC: 10 U/L (ref 1–32)
BASOPHILS # BLD AUTO: 0.03 10*3/MM3 (ref 0–0.2)
BASOPHILS NFR BLD AUTO: 0.4 % (ref 0–1.5)
BILIRUB SERPL-MCNC: 0.4 MG/DL (ref 0–1.2)
BUN SERPL-MCNC: 4 MG/DL (ref 8–23)
BUN/CREAT SERPL: 6.3 (ref 7–25)
CALCIUM SPEC-SCNC: 8.9 MG/DL (ref 8.6–10.5)
CHLORIDE SERPL-SCNC: 103 MMOL/L (ref 98–107)
CO2 SERPL-SCNC: 25.3 MMOL/L (ref 22–29)
CREAT SERPL-MCNC: 0.64 MG/DL (ref 0.57–1)
DEPRECATED RDW RBC AUTO: 42.5 FL (ref 37–54)
EGFRCR SERPLBLD CKD-EPI 2021: 95.8 ML/MIN/1.73
EOSINOPHIL # BLD AUTO: 0.13 10*3/MM3 (ref 0–0.4)
EOSINOPHIL NFR BLD AUTO: 1.6 % (ref 0.3–6.2)
ERYTHROCYTE [DISTWIDTH] IN BLOOD BY AUTOMATED COUNT: 12 % (ref 12.3–15.4)
GLOBULIN UR ELPH-MCNC: 3 GM/DL
GLUCOSE SERPL-MCNC: 110 MG/DL (ref 65–99)
HCT VFR BLD AUTO: 33.5 % (ref 34–46.6)
HGB BLD-MCNC: 11.3 G/DL (ref 12–15.9)
IMM GRANULOCYTES # BLD AUTO: 0.05 10*3/MM3 (ref 0–0.05)
IMM GRANULOCYTES NFR BLD AUTO: 0.6 % (ref 0–0.5)
LYMPHOCYTES # BLD AUTO: 1 10*3/MM3 (ref 0.7–3.1)
LYMPHOCYTES NFR BLD AUTO: 12.4 % (ref 19.6–45.3)
MAGNESIUM SERPL-MCNC: 1.7 MG/DL (ref 1.6–2.4)
MCH RBC QN AUTO: 32.8 PG (ref 26.6–33)
MCHC RBC AUTO-ENTMCNC: 33.7 G/DL (ref 31.5–35.7)
MCV RBC AUTO: 97.4 FL (ref 79–97)
MONOCYTES # BLD AUTO: 0.65 10*3/MM3 (ref 0.1–0.9)
MONOCYTES NFR BLD AUTO: 8.1 % (ref 5–12)
NEUTROPHILS NFR BLD AUTO: 6.19 10*3/MM3 (ref 1.7–7)
NEUTROPHILS NFR BLD AUTO: 76.9 % (ref 42.7–76)
NRBC BLD AUTO-RTO: 0 /100 WBC (ref 0–0.2)
PHOSPHATE SERPL-MCNC: 2.9 MG/DL (ref 2.5–4.5)
PLATELET # BLD AUTO: 350 10*3/MM3 (ref 140–450)
PMV BLD AUTO: 8.7 FL (ref 6–12)
POTASSIUM SERPL-SCNC: 3.8 MMOL/L (ref 3.5–5.2)
PROT SERPL-MCNC: 5.7 G/DL (ref 6–8.5)
RBC # BLD AUTO: 3.44 10*6/MM3 (ref 3.77–5.28)
SODIUM SERPL-SCNC: 138 MMOL/L (ref 136–145)
TSH SERPL DL<=0.05 MIU/L-ACNC: 0.77 UIU/ML (ref 0.27–4.2)
WBC NRBC COR # BLD: 8.05 10*3/MM3 (ref 3.4–10.8)

## 2022-08-23 PROCEDURE — 25010000002 HYDROMORPHONE 1 MG/ML SOLUTION: Performed by: SURGERY

## 2022-08-23 PROCEDURE — 85025 COMPLETE CBC W/AUTO DIFF WBC: CPT | Performed by: NURSE PRACTITIONER

## 2022-08-23 PROCEDURE — 83735 ASSAY OF MAGNESIUM: CPT | Performed by: INTERNAL MEDICINE

## 2022-08-23 PROCEDURE — 84100 ASSAY OF PHOSPHORUS: CPT | Performed by: INTERNAL MEDICINE

## 2022-08-23 PROCEDURE — 25010000002 ENOXAPARIN PER 10 MG: Performed by: SURGERY

## 2022-08-23 PROCEDURE — 84443 ASSAY THYROID STIM HORMONE: CPT | Performed by: INTERNAL MEDICINE

## 2022-08-23 PROCEDURE — 99024 POSTOP FOLLOW-UP VISIT: CPT | Performed by: NURSE PRACTITIONER

## 2022-08-23 PROCEDURE — 25010000002 ONDANSETRON PER 1 MG: Performed by: SURGERY

## 2022-08-23 PROCEDURE — 80053 COMPREHEN METABOLIC PANEL: CPT | Performed by: INTERNAL MEDICINE

## 2022-08-23 RX ORDER — ALUMINA, MAGNESIA, AND SIMETHICONE 2400; 2400; 240 MG/30ML; MG/30ML; MG/30ML
15 SUSPENSION ORAL EVERY 6 HOURS PRN
Status: DISCONTINUED | OUTPATIENT
Start: 2022-08-23 | End: 2022-08-26 | Stop reason: HOSPADM

## 2022-08-23 RX ORDER — PROMETHAZINE HYDROCHLORIDE 12.5 MG/1
12.5 TABLET ORAL EVERY 6 HOURS PRN
Status: DISCONTINUED | OUTPATIENT
Start: 2022-08-23 | End: 2022-08-26 | Stop reason: HOSPADM

## 2022-08-23 RX ORDER — CHOLECALCIFEROL (VITAMIN D3) 125 MCG
5 CAPSULE ORAL NIGHTLY PRN
Status: DISCONTINUED | OUTPATIENT
Start: 2022-08-23 | End: 2022-08-26 | Stop reason: HOSPADM

## 2022-08-23 RX ORDER — ACETAMINOPHEN 650 MG/1
650 SUPPOSITORY RECTAL EVERY 4 HOURS PRN
Status: DISCONTINUED | OUTPATIENT
Start: 2022-08-23 | End: 2022-08-26 | Stop reason: HOSPADM

## 2022-08-23 RX ORDER — ACETAMINOPHEN 325 MG/1
650 TABLET ORAL EVERY 4 HOURS PRN
Status: DISCONTINUED | OUTPATIENT
Start: 2022-08-23 | End: 2022-08-26 | Stop reason: HOSPADM

## 2022-08-23 RX ORDER — ACETAMINOPHEN 160 MG/5ML
650 SOLUTION ORAL EVERY 4 HOURS PRN
Status: DISCONTINUED | OUTPATIENT
Start: 2022-08-23 | End: 2022-08-26 | Stop reason: HOSPADM

## 2022-08-23 RX ADMIN — FAMOTIDINE 20 MG: 10 INJECTION INTRAVENOUS at 08:48

## 2022-08-23 RX ADMIN — ENOXAPARIN SODIUM 30 MG: 100 INJECTION SUBCUTANEOUS at 12:40

## 2022-08-23 RX ADMIN — Medication 10 ML: at 08:48

## 2022-08-23 RX ADMIN — HYDROMORPHONE HYDROCHLORIDE 0.5 MG: 1 INJECTION, SOLUTION INTRAMUSCULAR; INTRAVENOUS; SUBCUTANEOUS at 06:14

## 2022-08-23 RX ADMIN — ONDANSETRON 4 MG: 2 INJECTION INTRAMUSCULAR; INTRAVENOUS at 01:18

## 2022-08-23 RX ADMIN — Medication 10 ML: at 21:37

## 2022-08-23 RX ADMIN — ONDANSETRON 4 MG: 2 INJECTION INTRAMUSCULAR; INTRAVENOUS at 14:59

## 2022-08-23 RX ADMIN — HYDROMORPHONE HYDROCHLORIDE 0.5 MG: 1 INJECTION, SOLUTION INTRAMUSCULAR; INTRAVENOUS; SUBCUTANEOUS at 23:14

## 2022-08-23 RX ADMIN — ONDANSETRON 4 MG: 2 INJECTION INTRAMUSCULAR; INTRAVENOUS at 06:14

## 2022-08-23 RX ADMIN — FAMOTIDINE 20 MG: 10 INJECTION INTRAVENOUS at 21:37

## 2022-08-23 RX ADMIN — DEXTROSE MONOHYDRATE, SODIUM CHLORIDE, AND POTASSIUM CHLORIDE 75 ML/HR: 50; 9; 1.49 INJECTION, SOLUTION INTRAVENOUS at 15:01

## 2022-08-23 RX ADMIN — HYDROMORPHONE HYDROCHLORIDE 0.5 MG: 1 INJECTION, SOLUTION INTRAMUSCULAR; INTRAVENOUS; SUBCUTANEOUS at 17:12

## 2022-08-23 RX ADMIN — DEXTROSE MONOHYDRATE, SODIUM CHLORIDE, AND POTASSIUM CHLORIDE 75 ML/HR: 50; 9; 1.49 INJECTION, SOLUTION INTRAVENOUS at 01:22

## 2022-08-23 NOTE — CONSULTS
"Nutrition Services    Patient Name: Fanta Hawkins  YOB: 1950  MRN: [id number]  Admission date: 8/11/2022      CLINICAL NUTRITION ASSESSMENT      Reason for Assessment  Identified at risk by screening criteria, NPO/Clear liquid     H&P:    Past Medical History:   Diagnosis Date   • Arthritis    • Asthma    • COPD (chronic obstructive pulmonary disease) (HCC) 8/15/2022   • Injury of back    • Mood disorder (HCC)    • S/P exploratory laparotomy with lysis of adhesions 8/22/2022        Current Problems:   Active Hospital Problems    Diagnosis    • S/P exploratory laparotomy with lysis of adhesions    • Severe malnutrition (HCC)    • COPD (chronic obstructive pulmonary disease) (HCC)    • Small bowel obstruction (HCC)    • Nicotine use         Nutrition/Diet History         Narrative     Patient has been NPO x 12 days.  Reports she had clear liquids x 1 day since admission.      Is now POD#4 ex lap and CONSTANZA.  NG was removed 8/21/22.    Recommend patient start TPN if diet cannot be started today in setting of severe malnutrition and prolonged NPO status.       Anthropometrics        Current Height, Weight Height: 157.5 cm (62\")  Weight:  (PT IS IN CHAIR. WILL WEIGH LATER. MS PCA.)   Current BMI Body mass index is 18.47 kg/m².       Weight Hx  Wt Readings from Last 30 Encounters:   08/22/22 0531 45.8 kg (100 lb 15.5 oz)   08/21/22 0514 48 kg (105 lb 13.1 oz)   08/20/22 0600 48.1 kg (106 lb 0.7 oz)   08/19/22 0645 47.8 kg (105 lb 6.1 oz)   08/18/22 0600 48.3 kg (106 lb 7.7 oz)   08/17/22 0000 47.5 kg (104 lb 12.8 oz)   08/16/22 0420 47.5 kg (104 lb 11.5 oz)   08/15/22 0010 46.3 kg (102 lb 1.2 oz)   08/14/22 0500 48.7 kg (107 lb 5.8 oz)   08/12/22 2114 45.3 kg (99 lb 13.9 oz)   08/12/22 0640 52.9 kg (116 lb 10 oz)   08/11/22 1137 52.6 kg (115 lb 15.4 oz)   08/11/22 0623 52.3 kg (115 lb 4.8 oz)   11/05/21 0839 44 kg (97 lb)   07/29/20 0000 45 kg (99 lb 4 oz)   12/20/19 0000 44.9 kg (99 lb)            Wt " Change Observation 12.4% x 11 days     Estimated/Assessed Needs       Energy Requirements    EST Needs (kcal/day) 0882-6043       Protein Requirements    EST Daily Needs (g/day) 60-75       Fluid Requirements     Estimated Needs (mL/day) 1503     Labs/Medications         Pertinent Labs Reviewed.   Results from last 7 days   Lab Units 08/23/22  0440 08/22/22  0545 08/20/22  0416   SODIUM mmol/L 138 137 138   POTASSIUM mmol/L 3.8 3.9 4.3   CHLORIDE mmol/L 103 104 103   CO2 mmol/L 25.3 24.6 23.8   BUN mg/dL 4* 4* 9   CREATININE mg/dL 0.64 0.59 0.75   CALCIUM mg/dL 8.9 8.7 7.9*   BILIRUBIN mg/dL 0.4  --  0.3   ALK PHOS U/L 59  --  64   ALT (SGPT) U/L 9  --  16   AST (SGOT) U/L 10  --  20   GLUCOSE mg/dL 110* 118* 180*     Results from last 7 days   Lab Units 08/23/22  0440 08/22/22  0545 08/20/22  0416 08/18/22  0837 08/18/22  0837   MAGNESIUM mg/dL 1.7  --  1.6  --  1.8   PHOSPHORUS mg/dL 2.9  --  2.8   < >  --    HEMOGLOBIN g/dL 11.3*   < > 13.0  --  12.4   HEMATOCRIT % 33.5*   < > 39.1  --  36.1    < > = values in this interval not displayed.     No results found for: COVID19  No results found for: HGBA1C      Pertinent Medications Reviewed.     Current Nutrition Orders & Evaluation of Intake       Oral Nutrition     Current PO Diet NPO Diet NPO Type: Ice Chips, Sips with Meds   Supplement No active supplement orders       Malnutrition Severity Assessment      Patient meets criteria for : Severe Malnutrition  Malnutrition Type (last 8 hours)     Malnutrition Severity Assessment     Row Name 08/23/22 0934       Malnutrition Severity Assessment    Malnutrition Type Chronic Disease - Related Malnutrition    Row Name 08/23/22 0934       Insufficient Energy Intake     Insufficient Energy Intake Findings Severe    Insufficient Energy Intake  <75% of est. energy requirement for >7d)    Row Name 08/23/22 0934       Unintentional Weight Loss     Unintentional Weight Loss Findings Severe    Unintentional Weight Loss  Weight  loss greater than 5% in one month    Row Name 08/23/22 0934       Muscle Loss    Loss of Muscle Mass Findings Severe    Rastafarian Region Moderate - slight depression    Clavicle Bone Region Severe - protruding prominent bone    Acromion Bone Region Severe - squared shoulders, bones, and acromion process protrusion prominent    Patellar Region Severe - prominent bone, square looking, very little muscle definition    Anterior Thigh Region Severe - line/depression along thigh, obviously thin    Posterior Calf Region Severe - thin with very little definition/firmness    Row Name 08/23/22 0934       Fat Loss    Subcutaneous Fat Loss Findings Severe    Orbital Region  Moderate -  somewhat hollowness, slightly dark circles    Upper Arm Region Moderate - some fat tissue, not ample    Row Name 08/23/22 0934       Criteria Met (Must meet criteria for severity in at least 2 of these categories: M Wasting, Fat Loss, Fluid, Secondary Signs, Wt. Status, Intake)    Patient meets criteria for  Severe Malnutrition                   Nutrition Diagnosis         Nutrition Dx Problem 1 Inadequate energy Intake related to Inability to consume sufficient energy as evidenced by NPO       Nutrition Intervention         Recommend TPN:  D15% AA5% @ 55 ml/hr  20% Intralipid, 250 ml per day  Will provide 1437 kcal, 66 g pro per day     Medical Nutrition Therapy/Nutrition Education          Learner     Readiness Patient and Significant Other  Acceptance     Method     Response Explanation  Verbalizes understanding     Monitor/Evaluation        Monitor I&O, Diet advancement       Nutrition Discharge Plan         To be determined       Electronically signed by:  Lisa Morales RD  08/23/22 09:35 EDT

## 2022-08-23 NOTE — PLAN OF CARE
Goal Outcome Evaluation:  Plan of Care Reviewed With: patient        Progress: improving     No acute changes with patient throughout the shift. Patient able to pass flatus early this afternoon. Ambulated multiple times with standby assistance in the freeman. Able to tolerate oral popsicle per MD okay. Bed in lowest locked position with call light and tray table within reach.

## 2022-08-23 NOTE — PROGRESS NOTES
[facility]     Progress Note    Patient Name: Fanta Hawkins  : 1950  MRN: [id number]  Primary Care Physician:  Eduard Minaya MD  Date of admission: 2022      Subjective   Brief summary.  Admitted with SBO postsurgery      HPI:  Feeling better.  No nausea vomiting, no abdominal pain .  No BM yet  Passing gas yet    Review of Systems     No fever chills.  No nausea        Objective     Vitals:   Temp:  [97.7 °F (36.5 °C)-99.1 °F (37.3 °C)] 97.7 °F (36.5 °C)  Heart Rate:  [81-86] 82  Resp:  [16-20] 18  BP: (140-176)/(71-98) 176/96    Physical Exam :   Elderly female not in acute distress.  Heart regular.  Lungs clear.  Abdomen soft and obese and nontender, extremities no edema      Result Review:  I have personally reviewed the results from the time of this admission to 2022 18:03 EDT and agree with these findings:  [x]  Laboratory  []  Microbiology  []  Radiology  []  EKG/Telemetry   []  Cardiology/Vascular   []  Pathology  []  Old records  []  Other:           Assessment / Plan       Active Hospital Problems:  Active Hospital Problems    Diagnosis    • S/P exploratory laparotomy with lysis of adhesions    • Severe malnutrition (HCC)    • COPD (chronic obstructive pulmonary disease) (HCC)    • Small bowel obstruction (HCC)    • Nicotine use        Plan:   Stable  Bowel function still not back to normal  Continue fluids and supportive care  Labs and electrolytes reviewed and normal  Increase activity       DVT prophylaxis:  Medical DVT prophylaxis orders are present.    CODE STATUS:   Code Status (Patient has no pulse and is not breathing): CPR (Attempt to Resuscitate)  Medical Interventions (Patient has pulse or is breathing): Full Support            Electronically signed by Valentin Minaya MD, 22, 6:03 PM EDT.

## 2022-08-23 NOTE — PROGRESS NOTES
POST OP PROGRESS NOTE     Patient Name:  Fanta Hawkins  YOB: 1950  [id number]   LOS: 12 days   4 Days Post-Op  Patient Care Team:  Eduard Minaya MD as PCP - General (Internal Medicine)        Subjective     Interval History:  VSS, afebrile.  Has been walking frequently in hallway.  Pain controlled.  No flatus/BM.       Review of Systems:    All complete review of systems was performed and all are negative except what is documented in the HPI.       Objective     Vital Signs  Temp:  [97.7 °F (36.5 °C)-99.1 °F (37.3 °C)] 97.7 °F (36.5 °C)  Heart Rate:  [81-87] 81  Resp:  [16-20] 18  BP: (140-168)/(71-98) 168/71    Physical Exam:     General Appearance:   Alert, cooperative, in no acute distress   Head:   Normocephalic, without obvious abnormality, atraumatic   Eyes:            Lids and lashes normal, conjunctivae and sclerae normal, no     Icterus    Ears:   Ears appear intact with no abnormalities noted   Throat:  No oral lesions, oral mucosa moist  Neck: supple, trachea midline    Lungs:    Respirations regular, even and unlabored    Heart:   Regular rhythm and normal rate   Chest Wall:   No abnormalities observed   Abdomen:    no masses, no organomegaly, soft approp tender,midline incision with staples intact,  non-distended, no guarding   Extremities:  Moves all extremities well, no edema, no cyanosis, no               redness   Skin:  No bleeding, bruising or rash   Neurologic:  A/Ox3 with no deficits       Results Review:     I reviewed the patient's new clinical results including CBC and BMP.     LABS:  Lab Results (last 72 hours)     Procedure Component Value Units Date/Time    TSH [[id number]]  (Normal) Collected: 08/23/22 0440    Specimen: Blood Updated: 08/23/22 0654     TSH 0.770 uIU/mL     Comprehensive Metabolic Panel [[id number]]  (Abnormal) Collected: 08/23/22 0440    Specimen: Blood Updated: 08/23/22 0653     Glucose 110 mg/dL      BUN 4 mg/dL      Creatinine 0.64 mg/dL       Sodium 138 mmol/L      Potassium 3.8 mmol/L      Chloride 103 mmol/L      CO2 25.3 mmol/L      Calcium 8.9 mg/dL      Total Protein 5.7 g/dL      Albumin 2.70 g/dL      ALT (SGPT) 9 U/L      AST (SGOT) 10 U/L      Alkaline Phosphatase 59 U/L      Total Bilirubin 0.4 mg/dL      Globulin 3.0 gm/dL      A/G Ratio 0.9 g/dL      BUN/Creatinine Ratio 6.3     Anion Gap 9.7 mmol/L      eGFR 95.8 mL/min/1.73      Comment: National Kidney Foundation and American Society of Nephrology (ASN) Task Force recommended calculation based on the Chronic Kidney Disease Epidemiology Collaboration (CKD-EPI) equation refit without adjustment for race.       Narrative:      GFR Normal >60  Chronic Kidney Disease <60  Kidney Failure <15      Phosphorus [[id number]]  (Normal) Collected: 08/23/22 0440    Specimen: Blood Updated: 08/23/22 0652     Phosphorus 2.9 mg/dL     Magnesium [[id number]]  (Normal) Collected: 08/23/22 0440    Specimen: Blood Updated: 08/23/22 0652     Magnesium 1.7 mg/dL     CBC & Differential [[id number]]  (Abnormal) Collected: 08/23/22 0440    Specimen: Blood Updated: 08/23/22 0625    Narrative:      The following orders were created for panel order CBC & Differential.  Procedure                               Abnormality         Status                     ---------                               -----------         ------                     CBC Auto Differential[[id number]]        Abnormal            Final result                 Please view results for these tests on the individual orders.    CBC Auto Differential [[id number]]  (Abnormal) Collected: 08/23/22 0440    Specimen: Blood Updated: 08/23/22 0625     WBC 8.05 10*3/mm3      RBC 3.44 10*6/mm3      Hemoglobin 11.3 g/dL      Hematocrit 33.5 %      MCV 97.4 fL      MCH 32.8 pg      MCHC 33.7 g/dL      RDW 12.0 %      RDW-SD 42.5 fl      MPV 8.7 fL      Platelets 350 10*3/mm3      Neutrophil % 76.9 %      Lymphocyte % 12.4 %      Monocyte % 8.1 %      Eosinophil % 1.6 %       Basophil % 0.4 %      Immature Grans % 0.6 %      Neutrophils, Absolute 6.19 10*3/mm3      Lymphocytes, Absolute 1.00 10*3/mm3      Monocytes, Absolute 0.65 10*3/mm3      Eosinophils, Absolute 0.13 10*3/mm3      Basophils, Absolute 0.03 10*3/mm3      Immature Grans, Absolute 0.05 10*3/mm3      nRBC 0.0 /100 WBC     Basic Metabolic Panel [[id number]]  (Abnormal) Collected: 08/22/22 0545    Specimen: Blood Updated: 08/22/22 0709     Glucose 118 mg/dL      BUN 4 mg/dL      Creatinine 0.59 mg/dL      Sodium 137 mmol/L      Potassium 3.9 mmol/L      Chloride 104 mmol/L      CO2 24.6 mmol/L      Calcium 8.7 mg/dL      BUN/Creatinine Ratio 6.8     Anion Gap 8.4 mmol/L      eGFR 97.7 mL/min/1.73      Comment: National Kidney Foundation and American Society of Nephrology (ASN) Task Force recommended calculation based on the Chronic Kidney Disease Epidemiology Collaboration (CKD-EPI) equation refit without adjustment for race.       Narrative:      GFR Normal >60  Chronic Kidney Disease <60  Kidney Failure <15      CBC & Differential [[id number]]  (Abnormal) Collected: 08/22/22 0545    Specimen: Blood Updated: 08/22/22 0644    Narrative:      The following orders were created for panel order CBC & Differential.  Procedure                               Abnormality         Status                     ---------                               -----------         ------                     CBC Auto Differential[[id number]]        Abnormal            Final result                 Please view results for these tests on the individual orders.    CBC Auto Differential [[id number]]  (Abnormal) Collected: 08/22/22 0545    Specimen: Blood Updated: 08/22/22 0644     WBC 8.83 10*3/mm3      RBC 3.41 10*6/mm3      Hemoglobin 11.2 g/dL      Hematocrit 33.5 %      MCV 98.2 fL      MCH 32.8 pg      MCHC 33.4 g/dL      RDW 12.0 %      RDW-SD 43.3 fl      MPV 8.6 fL      Platelets 339 10*3/mm3      Neutrophil % 76.4 %      Lymphocyte % 11.9 %       Monocyte % 8.8 %      Eosinophil % 2.0 %      Basophil % 0.3 %      Immature Grans % 0.6 %      Neutrophils, Absolute 6.74 10*3/mm3      Lymphocytes, Absolute 1.05 10*3/mm3      Monocytes, Absolute 0.78 10*3/mm3      Eosinophils, Absolute 0.18 10*3/mm3      Basophils, Absolute 0.03 10*3/mm3      Immature Grans, Absolute 0.05 10*3/mm3      nRBC 0.0 /100 WBC           IMAGING:  Imaging Results (Last 72 Hours)     ** No results found for the last 72 hours. **          Medications:    Current Facility-Administered Medications:   •  acetaminophen (TYLENOL) tablet 650 mg, 650 mg, Oral, Q4H PRN **OR** acetaminophen (TYLENOL) 160 MG/5ML solution 650 mg, 650 mg, Oral, Q4H PRN **OR** acetaminophen (TYLENOL) suppository 650 mg, 650 mg, Rectal, Q4H PRN, Humera Walters  •  aluminum-magnesium hydroxide-simethicone (MAALOX MAX) 400-400-40 MG/5ML suspension 15 mL, 15 mL, Oral, Q6H PRN, Humera Walters  •  dextrose 5 % and sodium chloride 0.9 % with KCl 20 mEq/L infusion, 75 mL/hr, Intravenous, Continuous, Ruchi Villarreal APRN, Last Rate: 75 mL/hr at 22 0122, 75 mL/hr at 22 0122  •  Enoxaparin Sodium (LOVENOX) syringe 30 mg, 30 mg, Subcutaneous, Q24H, Snehal Medina MD, 30 mg at 22 1240  •  famotidine (PEPCID) injection 20 mg, 20 mg, Intravenous, Q12H, Snehal Medina MD, 20 mg at 22 0848  •  HYDROmorphone (DILAUDID) injection 0.5 mg, 0.5 mg, Intravenous, Q6H PRN, Snehal Medina MD, 0.5 mg at 22 0614  •  ipratropium-albuterol (DUO-NEB) nebulizer solution 3 mL, 3 mL, Nebulization, Q4H PRN, Snehal Medina MD  •  melatonin tablet 5 mg, 5 mg, Oral, Nightly PRN, Humera Walters  •  [] HYDROmorphone (DILAUDID) injection 0.5 mg, 0.5 mg, Intravenous, Q2H PRN, 0.5 mg at 22 0418 **AND** naloxone (NARCAN) injection 0.4 mg, 0.4 mg, Intravenous, Q5 Min PRN, Snehal Medina MD  •  ondansetron (ZOFRAN) injection 4 mg, 4 mg, Intravenous, Q4H PRN, Snehal Medina MD, 4  mg at 08/23/22 0614  •  ondansetron (ZOFRAN) injection 4 mg, 4 mg, Intravenous, Q6H PRN, Snehal Medina MD  •  phenol (CHLORASEPTIC) 1.4 % liquid 1 spray, 1 spray, Mouth/Throat, Q2H PRN, Snehal Medina MD, 1 spray at 08/20/22 1714  •  promethazine (PHENERGAN) tablet 12.5 mg, 12.5 mg, Oral, Q6H PRN, Humera Walters  •  sodium chloride 0.9 % flush 10 mL, 10 mL, Intravenous, PRN, Snehal Medina MD  •  sodium chloride 0.9 % flush 10 mL, 10 mL, Intravenous, PRN, Snehal Medina MD  •  sodium chloride 0.9 % flush 10 mL, 10 mL, Intravenous, Q12H, Snehal Medina MD, 10 mL at 08/23/22 0848  •  sodium chloride 0.9 % infusion 40 mL, 40 mL, Intravenous, PRN, Snehal Medina MD  •  sodium chloride 0.9 % infusion, 9 mL/hr, Intravenous, Continuous PRN, Snehal Medina MD    Assessment & Plan       Nicotine use    Small bowel obstruction (HCC)    COPD (chronic obstructive pulmonary disease) (HCC)    Severe malnutrition (HCC)    S/P exploratory laparotomy with lysis of adhesions   -CBC and BMP in am   -cont frequent ambulation and up in chair as much as possible   -may have popsicles    JIN Schmidt  08/23/22  14:16 EDT    Electronically signed by JIN Schmidt, 08/23/22, 2:16 PM EDT.

## 2022-08-24 PROBLEM — I10 ESSENTIAL HYPERTENSION: Status: ACTIVE | Noted: 2022-08-24

## 2022-08-24 LAB
ANION GAP SERPL CALCULATED.3IONS-SCNC: 8.8 MMOL/L (ref 5–15)
BASOPHILS # BLD AUTO: 0.04 10*3/MM3 (ref 0–0.2)
BASOPHILS NFR BLD AUTO: 0.5 % (ref 0–1.5)
BUN SERPL-MCNC: 5 MG/DL (ref 8–23)
BUN/CREAT SERPL: 8.2 (ref 7–25)
CALCIUM SPEC-SCNC: 8.6 MG/DL (ref 8.6–10.5)
CHLORIDE SERPL-SCNC: 104 MMOL/L (ref 98–107)
CO2 SERPL-SCNC: 26.2 MMOL/L (ref 22–29)
CREAT SERPL-MCNC: 0.61 MG/DL (ref 0.57–1)
DEPRECATED RDW RBC AUTO: 43.3 FL (ref 37–54)
EGFRCR SERPLBLD CKD-EPI 2021: 96.9 ML/MIN/1.73
EOSINOPHIL # BLD AUTO: 0.15 10*3/MM3 (ref 0–0.4)
EOSINOPHIL NFR BLD AUTO: 1.9 % (ref 0.3–6.2)
ERYTHROCYTE [DISTWIDTH] IN BLOOD BY AUTOMATED COUNT: 11.9 % (ref 12.3–15.4)
GLUCOSE SERPL-MCNC: 107 MG/DL (ref 65–99)
HCT VFR BLD AUTO: 32.5 % (ref 34–46.6)
HGB BLD-MCNC: 11 G/DL (ref 12–15.9)
IMM GRANULOCYTES # BLD AUTO: 0.04 10*3/MM3 (ref 0–0.05)
IMM GRANULOCYTES NFR BLD AUTO: 0.5 % (ref 0–0.5)
LYMPHOCYTES # BLD AUTO: 1.31 10*3/MM3 (ref 0.7–3.1)
LYMPHOCYTES NFR BLD AUTO: 16.8 % (ref 19.6–45.3)
MCH RBC QN AUTO: 32.9 PG (ref 26.6–33)
MCHC RBC AUTO-ENTMCNC: 33.8 G/DL (ref 31.5–35.7)
MCV RBC AUTO: 97.3 FL (ref 79–97)
MONOCYTES # BLD AUTO: 0.76 10*3/MM3 (ref 0.1–0.9)
MONOCYTES NFR BLD AUTO: 9.8 % (ref 5–12)
NEUTROPHILS NFR BLD AUTO: 5.48 10*3/MM3 (ref 1.7–7)
NEUTROPHILS NFR BLD AUTO: 70.5 % (ref 42.7–76)
NRBC BLD AUTO-RTO: 0 /100 WBC (ref 0–0.2)
PLATELET # BLD AUTO: 345 10*3/MM3 (ref 140–450)
PMV BLD AUTO: 8.7 FL (ref 6–12)
POTASSIUM SERPL-SCNC: 3.7 MMOL/L (ref 3.5–5.2)
RBC # BLD AUTO: 3.34 10*6/MM3 (ref 3.77–5.28)
SODIUM SERPL-SCNC: 139 MMOL/L (ref 136–145)
WBC NRBC COR # BLD: 7.78 10*3/MM3 (ref 3.4–10.8)

## 2022-08-24 PROCEDURE — 25010000002 HYDROMORPHONE 1 MG/ML SOLUTION: Performed by: SURGERY

## 2022-08-24 PROCEDURE — 80048 BASIC METABOLIC PNL TOTAL CA: CPT | Performed by: NURSE PRACTITIONER

## 2022-08-24 PROCEDURE — 99024 POSTOP FOLLOW-UP VISIT: CPT | Performed by: NURSE PRACTITIONER

## 2022-08-24 PROCEDURE — 25010000002 ENOXAPARIN PER 10 MG: Performed by: SURGERY

## 2022-08-24 PROCEDURE — 85025 COMPLETE CBC W/AUTO DIFF WBC: CPT | Performed by: NURSE PRACTITIONER

## 2022-08-24 PROCEDURE — 25010000002 ONDANSETRON PER 1 MG: Performed by: SURGERY

## 2022-08-24 RX ORDER — AMLODIPINE BESYLATE 2.5 MG/1
2.5 TABLET ORAL
Status: DISCONTINUED | OUTPATIENT
Start: 2022-08-24 | End: 2022-08-26 | Stop reason: HOSPADM

## 2022-08-24 RX ORDER — DOCUSATE SODIUM 100 MG/1
100 CAPSULE, LIQUID FILLED ORAL DAILY
Status: DISCONTINUED | OUTPATIENT
Start: 2022-08-24 | End: 2022-08-26 | Stop reason: HOSPADM

## 2022-08-24 RX ADMIN — HYDROMORPHONE HYDROCHLORIDE 0.5 MG: 1 INJECTION, SOLUTION INTRAMUSCULAR; INTRAVENOUS; SUBCUTANEOUS at 20:49

## 2022-08-24 RX ADMIN — ENOXAPARIN SODIUM 30 MG: 100 INJECTION SUBCUTANEOUS at 12:58

## 2022-08-24 RX ADMIN — ONDANSETRON 4 MG: 2 INJECTION INTRAMUSCULAR; INTRAVENOUS at 20:49

## 2022-08-24 RX ADMIN — ONDANSETRON 4 MG: 2 INJECTION INTRAMUSCULAR; INTRAVENOUS at 14:51

## 2022-08-24 RX ADMIN — FAMOTIDINE 20 MG: 10 INJECTION INTRAVENOUS at 20:30

## 2022-08-24 RX ADMIN — FAMOTIDINE 20 MG: 10 INJECTION INTRAVENOUS at 08:11

## 2022-08-24 RX ADMIN — DOCUSATE SODIUM 100 MG: 100 CAPSULE, LIQUID FILLED ORAL at 12:58

## 2022-08-24 RX ADMIN — Medication 10 ML: at 08:11

## 2022-08-24 RX ADMIN — HYDROMORPHONE HYDROCHLORIDE 0.5 MG: 1 INJECTION, SOLUTION INTRAMUSCULAR; INTRAVENOUS; SUBCUTANEOUS at 14:50

## 2022-08-24 RX ADMIN — AMLODIPINE BESYLATE 2.5 MG: 2.5 TABLET ORAL at 09:43

## 2022-08-24 NOTE — PROGRESS NOTES
[facility]     Progress Note    Patient Name: Fanta Hawkins  : 1950  MRN: [id number]  Primary Care Physician:  Eduard Minaya MD  Date of admission: 2022      Subjective   Brief summary.  Admitted with SBO postsurgery several days ago.      HPI:  Feeling better.  Passing gas today   Blood pressure slightly high    Review of Systems     No fever chills.  No nausea  No chest pain, no abdominal pain        Objective     Vitals:   Temp:  [97.4 °F (36.3 °C)-98.4 °F (36.9 °C)] 98.1 °F (36.7 °C)  Heart Rate:  [78-84] 80  Resp:  [14-18] 18  BP: (144-176)/(80-96) 154/82    Physical Exam :   Elderly female not in acute distress  Heart regular  Lungs clear  Abdomen soft nontender    Result Review:  I have personally reviewed the results from the time of this admission to 2022 14:23 EDT and agree with these findings:  [x]  Laboratory  []  Microbiology  []  Radiology  []  EKG/Telemetry   []  Cardiology/Vascular   []  Pathology  []  Old records  []  Other:           Assessment / Plan       Active Hospital Problems:  Active Hospital Problems    Diagnosis    • Essential hypertension    • S/P exploratory laparotomy with lysis of adhesions    • Severe malnutrition (HCC)    • COPD (chronic obstructive pulmonary disease) (HCC)    • Small bowel obstruction (HCC)    • Nicotine use        Plan:   Stable  Starting to pass gas  Oral feeds and diet initiated by surgical team  Continue to monitor  Blood pressure slightly high  We will add Norvasc 2.5       DVT prophylaxis:  Medical DVT prophylaxis orders are present.    CODE STATUS:   Code Status (Patient has no pulse and is not breathing): CPR (Attempt to Resuscitate)  Medical Interventions (Patient has pulse or is breathing): Full Support            Electronically signed by Valentin Minaya MD, 22, 2:23 PM EDT.

## 2022-08-24 NOTE — PROGRESS NOTES
POST OP PROGRESS NOTE     Patient Name:  Fanta Hawkins  YOB: 1950  [id number]   LOS: 13 days   5 Days Post-Op  Patient Care Team:  Eduard Minaya MD as PCP - General (Internal Medicine)      Subjective     Interval History:  VSS, afebrile. Tolerating popsicles and ice chips, passing lots of flatus.  Pain controlled. Ambulating in hallway.       Review of Systems:    All complete review of systems was performed and all are negative except what is documented in the HPI.       Objective     Vital Signs  Temp:  [97.4 °F (36.3 °C)-98.4 °F (36.9 °C)] 98 °F (36.7 °C)  Heart Rate:  [78-84] 84  Resp:  [14-18] 14  BP: (144-176)/(71-96) 144/89    Physical Exam:     General Appearance:   Alert, cooperative, in no acute distress   Head:   Normocephalic, without obvious abnormality, atraumatic   Eyes:            Lids and lashes normal, conjunctivae and sclerae normal, no     Icterus    Ears:   Ears appear intact with no abnormalities noted   Throat:  No oral lesions, oral mucosa moist  Neck: supple, trachea midline    Lungs:    Respirations regular, even and unlabored    Heart:   Regular rhythm and normal rate   Chest Wall:   No abnormalities observed   Abdomen:    no masses, no organomegaly, soft appropriately tender, midline incision with staples intact,  non-distended, no guarding   Extremities:  Moves all extremities well, no edema, no cyanosis, no               redness   Skin:  No bleeding, bruising or rash   Neurologic:  A/Ox3 with no deficits       Results Review:     I reviewed the patient's new clinical results including CBC and BMP.     LABS:  Lab Results (last 72 hours)     Procedure Component Value Units Date/Time    Basic Metabolic Panel [[id number]]  (Abnormal) Collected: 08/24/22 0440    Specimen: Blood Updated: 08/24/22 0610     Glucose 107 mg/dL      BUN 5 mg/dL      Creatinine 0.61 mg/dL      Sodium 139 mmol/L      Potassium 3.7 mmol/L      Chloride 104 mmol/L      CO2 26.2 mmol/L       Calcium 8.6 mg/dL      BUN/Creatinine Ratio 8.2     Anion Gap 8.8 mmol/L      eGFR 96.9 mL/min/1.73      Comment: National Kidney Foundation and American Society of Nephrology (ASN) Task Force recommended calculation based on the Chronic Kidney Disease Epidemiology Collaboration (CKD-EPI) equation refit without adjustment for race.       Narrative:      GFR Normal >60  Chronic Kidney Disease <60  Kidney Failure <15      CBC & Differential [[id number]]  (Abnormal) Collected: 08/24/22 0440    Specimen: Blood Updated: 08/24/22 0549    Narrative:      The following orders were created for panel order CBC & Differential.  Procedure                               Abnormality         Status                     ---------                               -----------         ------                     CBC Auto Differential[[id number]]        Abnormal            Final result                 Please view results for these tests on the individual orders.    CBC Auto Differential [[id number]]  (Abnormal) Collected: 08/24/22 0440    Specimen: Blood Updated: 08/24/22 0549     WBC 7.78 10*3/mm3      RBC 3.34 10*6/mm3      Hemoglobin 11.0 g/dL      Hematocrit 32.5 %      MCV 97.3 fL      MCH 32.9 pg      MCHC 33.8 g/dL      RDW 11.9 %      RDW-SD 43.3 fl      MPV 8.7 fL      Platelets 345 10*3/mm3      Neutrophil % 70.5 %      Lymphocyte % 16.8 %      Monocyte % 9.8 %      Eosinophil % 1.9 %      Basophil % 0.5 %      Immature Grans % 0.5 %      Neutrophils, Absolute 5.48 10*3/mm3      Lymphocytes, Absolute 1.31 10*3/mm3      Monocytes, Absolute 0.76 10*3/mm3      Eosinophils, Absolute 0.15 10*3/mm3      Basophils, Absolute 0.04 10*3/mm3      Immature Grans, Absolute 0.04 10*3/mm3      nRBC 0.0 /100 WBC     TSH [[id number]]  (Normal) Collected: 08/23/22 0440    Specimen: Blood Updated: 08/23/22 0654     TSH 0.770 uIU/mL     Comprehensive Metabolic Panel [[id number]]  (Abnormal) Collected: 08/23/22 0440    Specimen: Blood Updated: 08/23/22 0653      Glucose 110 mg/dL      BUN 4 mg/dL      Creatinine 0.64 mg/dL      Sodium 138 mmol/L      Potassium 3.8 mmol/L      Chloride 103 mmol/L      CO2 25.3 mmol/L      Calcium 8.9 mg/dL      Total Protein 5.7 g/dL      Albumin 2.70 g/dL      ALT (SGPT) 9 U/L      AST (SGOT) 10 U/L      Alkaline Phosphatase 59 U/L      Total Bilirubin 0.4 mg/dL      Globulin 3.0 gm/dL      A/G Ratio 0.9 g/dL      BUN/Creatinine Ratio 6.3     Anion Gap 9.7 mmol/L      eGFR 95.8 mL/min/1.73      Comment: National Kidney Foundation and American Society of Nephrology (ASN) Task Force recommended calculation based on the Chronic Kidney Disease Epidemiology Collaboration (CKD-EPI) equation refit without adjustment for race.       Narrative:      GFR Normal >60  Chronic Kidney Disease <60  Kidney Failure <15      Phosphorus [[id number]]  (Normal) Collected: 08/23/22 0440    Specimen: Blood Updated: 08/23/22 0652     Phosphorus 2.9 mg/dL     Magnesium [[id number]]  (Normal) Collected: 08/23/22 0440    Specimen: Blood Updated: 08/23/22 0652     Magnesium 1.7 mg/dL     CBC & Differential [[id number]]  (Abnormal) Collected: 08/23/22 0440    Specimen: Blood Updated: 08/23/22 0625    Narrative:      The following orders were created for panel order CBC & Differential.  Procedure                               Abnormality         Status                     ---------                               -----------         ------                     CBC Auto Differential[[id number]]        Abnormal            Final result                 Please view results for these tests on the individual orders.    CBC Auto Differential [[id number]]  (Abnormal) Collected: 08/23/22 0440    Specimen: Blood Updated: 08/23/22 0625     WBC 8.05 10*3/mm3      RBC 3.44 10*6/mm3      Hemoglobin 11.3 g/dL      Hematocrit 33.5 %      MCV 97.4 fL      MCH 32.8 pg      MCHC 33.7 g/dL      RDW 12.0 %      RDW-SD 42.5 fl      MPV 8.7 fL      Platelets 350 10*3/mm3      Neutrophil % 76.9 %       Lymphocyte % 12.4 %      Monocyte % 8.1 %      Eosinophil % 1.6 %      Basophil % 0.4 %      Immature Grans % 0.6 %      Neutrophils, Absolute 6.19 10*3/mm3      Lymphocytes, Absolute 1.00 10*3/mm3      Monocytes, Absolute 0.65 10*3/mm3      Eosinophils, Absolute 0.13 10*3/mm3      Basophils, Absolute 0.03 10*3/mm3      Immature Grans, Absolute 0.05 10*3/mm3      nRBC 0.0 /100 WBC     Basic Metabolic Panel [[id number]]  (Abnormal) Collected: 08/22/22 0545    Specimen: Blood Updated: 08/22/22 0709     Glucose 118 mg/dL      BUN 4 mg/dL      Creatinine 0.59 mg/dL      Sodium 137 mmol/L      Potassium 3.9 mmol/L      Chloride 104 mmol/L      CO2 24.6 mmol/L      Calcium 8.7 mg/dL      BUN/Creatinine Ratio 6.8     Anion Gap 8.4 mmol/L      eGFR 97.7 mL/min/1.73      Comment: National Kidney Foundation and American Society of Nephrology (ASN) Task Force recommended calculation based on the Chronic Kidney Disease Epidemiology Collaboration (CKD-EPI) equation refit without adjustment for race.       Narrative:      GFR Normal >60  Chronic Kidney Disease <60  Kidney Failure <15      CBC & Differential [[id number]]  (Abnormal) Collected: 08/22/22 0545    Specimen: Blood Updated: 08/22/22 0644    Narrative:      The following orders were created for panel order CBC & Differential.  Procedure                               Abnormality         Status                     ---------                               -----------         ------                     CBC Auto Differential[[id number]]        Abnormal            Final result                 Please view results for these tests on the individual orders.    CBC Auto Differential [[id number]]  (Abnormal) Collected: 08/22/22 0545    Specimen: Blood Updated: 08/22/22 0644     WBC 8.83 10*3/mm3      RBC 3.41 10*6/mm3      Hemoglobin 11.2 g/dL      Hematocrit 33.5 %      MCV 98.2 fL      MCH 32.8 pg      MCHC 33.4 g/dL      RDW 12.0 %      RDW-SD 43.3 fl      MPV 8.6 fL       Platelets 339 10*3/mm3      Neutrophil % 76.4 %      Lymphocyte % 11.9 %      Monocyte % 8.8 %      Eosinophil % 2.0 %      Basophil % 0.3 %      Immature Grans % 0.6 %      Neutrophils, Absolute 6.74 10*3/mm3      Lymphocytes, Absolute 1.05 10*3/mm3      Monocytes, Absolute 0.78 10*3/mm3      Eosinophils, Absolute 0.18 10*3/mm3      Basophils, Absolute 0.03 10*3/mm3      Immature Grans, Absolute 0.05 10*3/mm3      nRBC 0.0 /100 WBC           IMAGING:  Imaging Results (Last 72 Hours)     ** No results found for the last 72 hours. **          Medications:    Current Facility-Administered Medications:   •  acetaminophen (TYLENOL) tablet 650 mg, 650 mg, Oral, Q4H PRN **OR** acetaminophen (TYLENOL) 160 MG/5ML solution 650 mg, 650 mg, Oral, Q4H PRN **OR** acetaminophen (TYLENOL) suppository 650 mg, 650 mg, Rectal, Q4H PRN, Humera Walters  •  aluminum-magnesium hydroxide-simethicone (MAALOX MAX) 400-400-40 MG/5ML suspension 15 mL, 15 mL, Oral, Q6H PRN, Humera Walters  •  amLODIPine (NORVASC) tablet 2.5 mg, 2.5 mg, Oral, Q24H, Valentin Minaya MD, 2.5 mg at 22 0943  •  dextrose 5 % and sodium chloride 0.9 % with KCl 20 mEq/L infusion, 50 mL/hr, Intravenous, Continuous, Valentin Minaya MD, Last Rate: 50 mL/hr at 22 1741, 50 mL/hr at 22 174  •  Enoxaparin Sodium (LOVENOX) syringe 30 mg, 30 mg, Subcutaneous, Q24H, Snehal Medina MD, 30 mg at 22 1240  •  famotidine (PEPCID) injection 20 mg, 20 mg, Intravenous, Q12H, Snehal Medina MD, 20 mg at 22 0811  •  HYDROmorphone (DILAUDID) injection 0.5 mg, 0.5 mg, Intravenous, Q6H PRN, Snehal Medina, MD, 0.5 mg at 22 0959  •  ipratropium-albuterol (DUO-NEB) nebulizer solution 3 mL, 3 mL, Nebulization, Q4H PRN, Snehal Medina MD  •  melatonin tablet 5 mg, 5 mg, Oral, Nightly PRN, Humera Walters  •  [] HYDROmorphone (DILAUDID) injection 0.5 mg, 0.5 mg, Intravenous, Q2H PRN, 0.5 mg at 22 5518 **AND** naloxone (NARCAN)  injection 0.4 mg, 0.4 mg, Intravenous, Q5 Min PRN, Snehal Medina MD  •  ondansetron (ZOFRAN) injection 4 mg, 4 mg, Intravenous, Q4H PRN, Snehal Medina MD, 4 mg at 08/23/22 1459  •  ondansetron (ZOFRAN) injection 4 mg, 4 mg, Intravenous, Q6H PRN, Snehal Medina MD  •  phenol (CHLORASEPTIC) 1.4 % liquid 1 spray, 1 spray, Mouth/Throat, Q2H PRN, Snehal Medina MD, 1 spray at 08/20/22 1714  •  promethazine (PHENERGAN) tablet 12.5 mg, 12.5 mg, Oral, Q6H PRN, Humera Walters  •  sodium chloride 0.9 % flush 10 mL, 10 mL, Intravenous, PRN, Snehal Medina MD  •  sodium chloride 0.9 % flush 10 mL, 10 mL, Intravenous, PRN, Snehal Medina MD  •  sodium chloride 0.9 % flush 10 mL, 10 mL, Intravenous, Q12H, Snehal Medina MD, 10 mL at 08/24/22 0811  •  sodium chloride 0.9 % infusion 40 mL, 40 mL, Intravenous, PRN, Snehal Medina MD  •  sodium chloride 0.9 % infusion, 9 mL/hr, Intravenous, Continuous PRN, Snehal Medina MD    Assessment & Plan       Nicotine use    Small bowel obstruction (HCC)    COPD (chronic obstructive pulmonary disease) (HCC)    Severe malnutrition (HCC)    S/P exploratory laparotomy with lysis of adhesions   -full liquid diet   -ambulate   -start daily colace    JIN Schmidt  08/24/22  10:04 EDT    Electronically signed by JIN Schmidt, 08/24/22, 10:04 AM EDT.

## 2022-08-24 NOTE — PLAN OF CARE
Goal Outcome Evaluation:  Plan of Care Reviewed With: patient        Progress: improving     Patient with minimal complaints of pain or nausea throughout the shift. Tolerating minimal full liquid diet. Ambulated multiple times throughout the shift in the freeman with standby assistance. Patient resting in bed with bed in lowest locked position with call light and tray table within reach.

## 2022-08-25 LAB
BASOPHILS # BLD AUTO: 0.04 10*3/MM3 (ref 0–0.2)
BASOPHILS NFR BLD AUTO: 0.5 % (ref 0–1.5)
DEPRECATED RDW RBC AUTO: 42.9 FL (ref 37–54)
EOSINOPHIL # BLD AUTO: 0.16 10*3/MM3 (ref 0–0.4)
EOSINOPHIL NFR BLD AUTO: 2.1 % (ref 0.3–6.2)
ERYTHROCYTE [DISTWIDTH] IN BLOOD BY AUTOMATED COUNT: 11.9 % (ref 12.3–15.4)
HCT VFR BLD AUTO: 31.4 % (ref 34–46.6)
HGB BLD-MCNC: 10.7 G/DL (ref 12–15.9)
IMM GRANULOCYTES # BLD AUTO: 0.03 10*3/MM3 (ref 0–0.05)
IMM GRANULOCYTES NFR BLD AUTO: 0.4 % (ref 0–0.5)
LYMPHOCYTES # BLD AUTO: 1.29 10*3/MM3 (ref 0.7–3.1)
LYMPHOCYTES NFR BLD AUTO: 16.5 % (ref 19.6–45.3)
MCH RBC QN AUTO: 33.2 PG (ref 26.6–33)
MCHC RBC AUTO-ENTMCNC: 34.1 G/DL (ref 31.5–35.7)
MCV RBC AUTO: 97.5 FL (ref 79–97)
MONOCYTES # BLD AUTO: 0.83 10*3/MM3 (ref 0.1–0.9)
MONOCYTES NFR BLD AUTO: 10.6 % (ref 5–12)
NEUTROPHILS NFR BLD AUTO: 5.45 10*3/MM3 (ref 1.7–7)
NEUTROPHILS NFR BLD AUTO: 69.9 % (ref 42.7–76)
NRBC BLD AUTO-RTO: 0 /100 WBC (ref 0–0.2)
PLATELET # BLD AUTO: 320 10*3/MM3 (ref 140–450)
PMV BLD AUTO: 8.7 FL (ref 6–12)
RBC # BLD AUTO: 3.22 10*6/MM3 (ref 3.77–5.28)
WBC NRBC COR # BLD: 7.8 10*3/MM3 (ref 3.4–10.8)

## 2022-08-25 PROCEDURE — 25010000002 ONDANSETRON PER 1 MG: Performed by: SURGERY

## 2022-08-25 PROCEDURE — 85025 COMPLETE CBC W/AUTO DIFF WBC: CPT | Performed by: NURSE PRACTITIONER

## 2022-08-25 PROCEDURE — 99024 POSTOP FOLLOW-UP VISIT: CPT | Performed by: NURSE PRACTITIONER

## 2022-08-25 PROCEDURE — 25010000002 ENOXAPARIN PER 10 MG: Performed by: SURGERY

## 2022-08-25 RX ADMIN — Medication 10 ML: at 08:35

## 2022-08-25 RX ADMIN — Medication 10 ML: at 21:20

## 2022-08-25 RX ADMIN — DOCUSATE SODIUM 100 MG: 100 CAPSULE, LIQUID FILLED ORAL at 08:35

## 2022-08-25 RX ADMIN — AMLODIPINE BESYLATE 2.5 MG: 2.5 TABLET ORAL at 08:35

## 2022-08-25 RX ADMIN — Medication 5 MG: at 22:45

## 2022-08-25 RX ADMIN — ONDANSETRON 4 MG: 2 INJECTION INTRAMUSCULAR; INTRAVENOUS at 19:28

## 2022-08-25 RX ADMIN — FAMOTIDINE 20 MG: 10 INJECTION INTRAVENOUS at 21:20

## 2022-08-25 RX ADMIN — ENOXAPARIN SODIUM 30 MG: 100 INJECTION SUBCUTANEOUS at 12:25

## 2022-08-25 RX ADMIN — FAMOTIDINE 20 MG: 10 INJECTION INTRAVENOUS at 08:35

## 2022-08-25 RX ADMIN — ACETAMINOPHEN 325MG 650 MG: 325 TABLET ORAL at 22:45

## 2022-08-25 NOTE — PLAN OF CARE
Goal Outcome Evaluation:  Plan of Care Reviewed With: patient        Progress: improving  Outcome Evaluation: Required medication for pain during shift. Fluids discontinued per MD order. Son at bedside. Dominic Ramos RN

## 2022-08-25 NOTE — CONSULTS
"Nutrition Services    Patient Name: Fanta Hawkins  YOB: 1950  MRN: [id number]  Admission date: 8/11/2022      CLINICAL NUTRITION ASSESSMENT      Reason for Assessment  Identified at risk by screening criteria, NPO/Clear liquid     H&P:    Past Medical History:   Diagnosis Date   • Arthritis    • Asthma    • COPD (chronic obstructive pulmonary disease) (HCC) 8/15/2022   • Essential hypertension 8/24/2022   • Injury of back    • Mood disorder (HCC)    • S/P exploratory laparotomy with lysis of adhesions 8/22/2022        Current Problems:   Active Hospital Problems    Diagnosis    • Essential hypertension    • S/P exploratory laparotomy with lysis of adhesions    • Severe malnutrition (HCC)    • COPD (chronic obstructive pulmonary disease) (HCC)    • Small bowel obstruction (HCC)    • Nicotine use         Nutrition/Diet History         Narrative     Patient has been NPO x 12 days.  Reports she had clear liquids x 1 day since admission.    Is now POD#4 ex lap and CONSTANZA.  NG was removed 8/21/22.    Pt was recenetly progressed to diet and has since been consuming 50-75% of meals. Will continue to follow and monitor at this time.      Anthropometrics        Current Height, Weight Height: 157.5 cm (62\")  Weight: 44 kg (97 lb)   Current BMI Body mass index is 17.74 kg/m².       Weight Hx  Wt Readings from Last 30 Encounters:   08/25/22 0500 44 kg (97 lb)   08/24/22 0545 45.5 kg (100 lb 5 oz)   08/22/22 0531 45.8 kg (100 lb 15.5 oz)   08/21/22 0514 48 kg (105 lb 13.1 oz)   08/20/22 0600 48.1 kg (106 lb 0.7 oz)   08/19/22 0645 47.8 kg (105 lb 6.1 oz)   08/18/22 0600 48.3 kg (106 lb 7.7 oz)   08/17/22 0000 47.5 kg (104 lb 12.8 oz)   08/16/22 0420 47.5 kg (104 lb 11.5 oz)   08/15/22 0010 46.3 kg (102 lb 1.2 oz)   08/14/22 0500 48.7 kg (107 lb 5.8 oz)   08/12/22 2114 45.3 kg (99 lb 13.9 oz)   08/12/22 0640 52.9 kg (116 lb 10 oz)   08/11/22 1137 52.6 kg (115 lb 15.4 oz)   08/11/22 0623 52.3 kg (115 lb 4.8 oz) "   11/05/21 0839 44 kg (97 lb)   07/29/20 0000 45 kg (99 lb 4 oz)   12/20/19 0000 44.9 kg (99 lb)            Wt Change Observation 12.4% x 11 days     Estimated/Assessed Needs       Energy Requirements    EST Needs (kcal/day) 2885-4056       Protein Requirements    EST Daily Needs (g/day) 60-75       Fluid Requirements     Estimated Needs (mL/day) 1503     Labs/Medications         Pertinent Labs Reviewed.   Results from last 7 days   Lab Units 08/24/22 0440 08/23/22  0440 08/22/22  0545 08/20/22  0416   SODIUM mmol/L 139 138 137 138   POTASSIUM mmol/L 3.7 3.8 3.9 4.3   CHLORIDE mmol/L 104 103 104 103   CO2 mmol/L 26.2 25.3 24.6 23.8   BUN mg/dL 5* 4* 4* 9   CREATININE mg/dL 0.61 0.64 0.59 0.75   CALCIUM mg/dL 8.6 8.9 8.7 7.9*   BILIRUBIN mg/dL  --  0.4  --  0.3   ALK PHOS U/L  --  59  --  64   ALT (SGPT) U/L  --  9  --  16   AST (SGOT) U/L  --  10  --  20   GLUCOSE mg/dL 107* 110* 118* 180*     Results from last 7 days   Lab Units 08/25/22  0435 08/24/22  0440 08/23/22  0440 08/22/22  0545 08/20/22  0416   MAGNESIUM mg/dL  --   --  1.7  --  1.6   PHOSPHORUS mg/dL  --   --  2.9  --  2.8   HEMOGLOBIN g/dL 10.7*   < > 11.3*   < > 13.0   HEMATOCRIT % 31.4*   < > 33.5*   < > 39.1    < > = values in this interval not displayed.     No results found for: COVID19  No results found for: HGBA1C      Pertinent Medications Reviewed.     Current Nutrition Orders & Evaluation of Intake       Oral Nutrition     Current PO Diet Diet Regular   Supplement No active supplement orders       Malnutrition Severity Assessment      Patient meets criteria for : Severe Malnutrition         Nutrition Diagnosis         Nutrition Dx Problem 1 Inadequate energy Intake related to Inability to consume sufficient energy as evidenced by NPO for period of admission and recent progression to regular diet       Nutrition Intervention              Medical Nutrition Therapy/Nutrition Education          Learner     Readiness Patient and Significant  Other  Acceptance     Method     Response Explanation  Verbalizes understanding     Monitor/Evaluation        Monitor I&O, Diet advancement       Nutrition Discharge Plan         To be determined       Electronically signed by:  Medina Dixon RD  08/25/22 13:49 EDT

## 2022-08-25 NOTE — PROGRESS NOTES
POST OP PROGRESS NOTE     Patient Name:  Fanta Hawkins  YOB: 1950  [id number]   LOS: 14 days   6 Days Post-Op  Patient Care Team:  Eduard Minaya MD as PCP - General (Internal Medicine)        Subjective     Interval History:  VSS, afebrile. Tolerating full liquid diet. Pain controlled.  +flatus, small loose BM last evening.       Review of Systems:    All complete review of systems was performed and all are negative except what is documented in the HPI.       Objective     Vital Signs  Temp:  [97.5 °F (36.4 °C)-98.6 °F (37 °C)] 97.9 °F (36.6 °C)  Heart Rate:  [78-94] 94  Resp:  [14-18] 14  BP: (140-177)/(75-87) 142/86    Physical Exam:     General Appearance:   Alert, cooperative, in no acute distress   Head:   Normocephalic, without obvious abnormality, atraumatic   Eyes:            Lids and lashes normal, conjunctivae and sclerae normal, no     Icterus    Ears:   Ears appear intact with no abnormalities noted   Throat:  No oral lesions, oral mucosa moist  Neck: supple, trachea midline    Lungs:    Respirations regular, even and unlabored    Heart:   Regular rhythm and normal rate   Chest Wall:   No abnormalities observed   Abdomen:    no masses, no organomegaly, soft approp tender, non-distended, no guarding, midline incision with staples intact   Extremities:  Moves all extremities well, no edema, no cyanosis, no               redness   Skin:  No bleeding, bruising or rash   Neurologic:  A/Ox3 with no deficits       Results Review:     I reviewed the patient's new clinical results including CBC and BMP.     LABS:  Lab Results (last 72 hours)     Procedure Component Value Units Date/Time    CBC & Differential [[id number]]  (Abnormal) Collected: 08/25/22 0435    Specimen: Blood Updated: 08/25/22 0611    Narrative:      The following orders were created for panel order CBC & Differential.  Procedure                               Abnormality         Status                     ---------                                -----------         ------                     CBC Auto Differential[[id number]]        Abnormal            Final result                 Please view results for these tests on the individual orders.    CBC Auto Differential [[id number]]  (Abnormal) Collected: 08/25/22 0435    Specimen: Blood Updated: 08/25/22 0611     WBC 7.80 10*3/mm3      RBC 3.22 10*6/mm3      Hemoglobin 10.7 g/dL      Hematocrit 31.4 %      MCV 97.5 fL      MCH 33.2 pg      MCHC 34.1 g/dL      RDW 11.9 %      RDW-SD 42.9 fl      MPV 8.7 fL      Platelets 320 10*3/mm3      Neutrophil % 69.9 %      Lymphocyte % 16.5 %      Monocyte % 10.6 %      Eosinophil % 2.1 %      Basophil % 0.5 %      Immature Grans % 0.4 %      Neutrophils, Absolute 5.45 10*3/mm3      Lymphocytes, Absolute 1.29 10*3/mm3      Monocytes, Absolute 0.83 10*3/mm3      Eosinophils, Absolute 0.16 10*3/mm3      Basophils, Absolute 0.04 10*3/mm3      Immature Grans, Absolute 0.03 10*3/mm3      nRBC 0.0 /100 WBC     Basic Metabolic Panel [[id number]]  (Abnormal) Collected: 08/24/22 0440    Specimen: Blood Updated: 08/24/22 0610     Glucose 107 mg/dL      BUN 5 mg/dL      Creatinine 0.61 mg/dL      Sodium 139 mmol/L      Potassium 3.7 mmol/L      Chloride 104 mmol/L      CO2 26.2 mmol/L      Calcium 8.6 mg/dL      BUN/Creatinine Ratio 8.2     Anion Gap 8.8 mmol/L      eGFR 96.9 mL/min/1.73      Comment: National Kidney Foundation and American Society of Nephrology (ASN) Task Force recommended calculation based on the Chronic Kidney Disease Epidemiology Collaboration (CKD-EPI) equation refit without adjustment for race.       Narrative:      GFR Normal >60  Chronic Kidney Disease <60  Kidney Failure <15      CBC & Differential [[id number]]  (Abnormal) Collected: 08/24/22 0440    Specimen: Blood Updated: 08/24/22 0549    Narrative:      The following orders were created for panel order CBC & Differential.  Procedure                               Abnormality          Status                     ---------                               -----------         ------                     CBC Auto Differential[[id number]]        Abnormal            Final result                 Please view results for these tests on the individual orders.    CBC Auto Differential [[id number]]  (Abnormal) Collected: 08/24/22 0440    Specimen: Blood Updated: 08/24/22 0549     WBC 7.78 10*3/mm3      RBC 3.34 10*6/mm3      Hemoglobin 11.0 g/dL      Hematocrit 32.5 %      MCV 97.3 fL      MCH 32.9 pg      MCHC 33.8 g/dL      RDW 11.9 %      RDW-SD 43.3 fl      MPV 8.7 fL      Platelets 345 10*3/mm3      Neutrophil % 70.5 %      Lymphocyte % 16.8 %      Monocyte % 9.8 %      Eosinophil % 1.9 %      Basophil % 0.5 %      Immature Grans % 0.5 %      Neutrophils, Absolute 5.48 10*3/mm3      Lymphocytes, Absolute 1.31 10*3/mm3      Monocytes, Absolute 0.76 10*3/mm3      Eosinophils, Absolute 0.15 10*3/mm3      Basophils, Absolute 0.04 10*3/mm3      Immature Grans, Absolute 0.04 10*3/mm3      nRBC 0.0 /100 WBC     TSH [[id number]]  (Normal) Collected: 08/23/22 0440    Specimen: Blood Updated: 08/23/22 0654     TSH 0.770 uIU/mL     Comprehensive Metabolic Panel [[id number]]  (Abnormal) Collected: 08/23/22 0440    Specimen: Blood Updated: 08/23/22 0653     Glucose 110 mg/dL      BUN 4 mg/dL      Creatinine 0.64 mg/dL      Sodium 138 mmol/L      Potassium 3.8 mmol/L      Chloride 103 mmol/L      CO2 25.3 mmol/L      Calcium 8.9 mg/dL      Total Protein 5.7 g/dL      Albumin 2.70 g/dL      ALT (SGPT) 9 U/L      AST (SGOT) 10 U/L      Alkaline Phosphatase 59 U/L      Total Bilirubin 0.4 mg/dL      Globulin 3.0 gm/dL      A/G Ratio 0.9 g/dL      BUN/Creatinine Ratio 6.3     Anion Gap 9.7 mmol/L      eGFR 95.8 mL/min/1.73      Comment: National Kidney Foundation and American Society of Nephrology (ASN) Task Force recommended calculation based on the Chronic Kidney Disease Epidemiology Collaboration (CKD-EPI) equation refit  without adjustment for race.       Narrative:      GFR Normal >60  Chronic Kidney Disease <60  Kidney Failure <15      Phosphorus [[id number]]  (Normal) Collected: 08/23/22 0440    Specimen: Blood Updated: 08/23/22 0652     Phosphorus 2.9 mg/dL     Magnesium [[id number]]  (Normal) Collected: 08/23/22 0440    Specimen: Blood Updated: 08/23/22 0652     Magnesium 1.7 mg/dL     CBC & Differential [[id number]]  (Abnormal) Collected: 08/23/22 0440    Specimen: Blood Updated: 08/23/22 0625    Narrative:      The following orders were created for panel order CBC & Differential.  Procedure                               Abnormality         Status                     ---------                               -----------         ------                     CBC Auto Differential[[id number]]        Abnormal            Final result                 Please view results for these tests on the individual orders.    CBC Auto Differential [[id number]]  (Abnormal) Collected: 08/23/22 0440    Specimen: Blood Updated: 08/23/22 0625     WBC 8.05 10*3/mm3      RBC 3.44 10*6/mm3      Hemoglobin 11.3 g/dL      Hematocrit 33.5 %      MCV 97.4 fL      MCH 32.8 pg      MCHC 33.7 g/dL      RDW 12.0 %      RDW-SD 42.5 fl      MPV 8.7 fL      Platelets 350 10*3/mm3      Neutrophil % 76.9 %      Lymphocyte % 12.4 %      Monocyte % 8.1 %      Eosinophil % 1.6 %      Basophil % 0.4 %      Immature Grans % 0.6 %      Neutrophils, Absolute 6.19 10*3/mm3      Lymphocytes, Absolute 1.00 10*3/mm3      Monocytes, Absolute 0.65 10*3/mm3      Eosinophils, Absolute 0.13 10*3/mm3      Basophils, Absolute 0.03 10*3/mm3      Immature Grans, Absolute 0.05 10*3/mm3      nRBC 0.0 /100 WBC           IMAGING:  Imaging Results (Last 72 Hours)     ** No results found for the last 72 hours. **          Medications:    Current Facility-Administered Medications:   •  acetaminophen (TYLENOL) tablet 650 mg, 650 mg, Oral, Q4H PRN **OR** acetaminophen (TYLENOL) 160 MG/5ML solution 650  mg, 650 mg, Oral, Q4H PRN **OR** acetaminophen (TYLENOL) suppository 650 mg, 650 mg, Rectal, Q4H PRN, Humera Walters  •  aluminum-magnesium hydroxide-simethicone (MAALOX MAX) 400-400-40 MG/5ML suspension 15 mL, 15 mL, Oral, Q6H PRN, Humera Walters  •  amLODIPine (NORVASC) tablet 2.5 mg, 2.5 mg, Oral, Q24H, Valentin Minaya MD, 2.5 mg at 22  •  docusate sodium (COLACE) capsule 100 mg, 100 mg, Oral, Daily, Maria C Aranda APRN, 100 mg at 22  •  Enoxaparin Sodium (LOVENOX) syringe 30 mg, 30 mg, Subcutaneous, Q24H, Snehal Medina MD, 30 mg at 22 1258  •  famotidine (PEPCID) injection 20 mg, 20 mg, Intravenous, Q12H, Snehal Medina MD, 20 mg at 22  •  HYDROmorphone (DILAUDID) injection 0.5 mg, 0.5 mg, Intravenous, Q6H PRN, Snehal Medina MD, 0.5 mg at 22  •  ipratropium-albuterol (DUO-NEB) nebulizer solution 3 mL, 3 mL, Nebulization, Q4H PRN, Snehal Medina MD  •  melatonin tablet 5 mg, 5 mg, Oral, Nightly PRN, Humera Walters  •  [] HYDROmorphone (DILAUDID) injection 0.5 mg, 0.5 mg, Intravenous, Q2H PRN, 0.5 mg at 228 **AND** naloxone (NARCAN) injection 0.4 mg, 0.4 mg, Intravenous, Q5 Min PRN, Snehal Medina MD  •  ondansetron (ZOFRAN) injection 4 mg, 4 mg, Intravenous, Q4H PRN, Snehal Medina MD, 4 mg at 22  •  ondansetron (ZOFRAN) injection 4 mg, 4 mg, Intravenous, Q6H PRN, Snehal Medina MD  •  phenol (CHLORASEPTIC) 1.4 % liquid 1 spray, 1 spray, Mouth/Throat, Q2H PRN, Snehal Medina MD, 1 spray at 22 1714  •  promethazine (PHENERGAN) tablet 12.5 mg, 12.5 mg, Oral, Q6H PRN, Humera Walters  •  sodium chloride 0.9 % flush 10 mL, 10 mL, Intravenous, PRN, Snehal Medina MD  •  sodium chloride 0.9 % flush 10 mL, 10 mL, Intravenous, PRN, Snehal Medina MD  •  sodium chloride 0.9 % flush 10 mL, 10 mL, Intravenous, Q12H, Snehal Medina MD, 10 mL at 22 0835  •  sodium  chloride 0.9 % infusion 40 mL, 40 mL, Intravenous, PRN, Snehal Medina MD  •  sodium chloride 0.9 % infusion, 9 mL/hr, Intravenous, Continuous PRN, Snehal Medina MD    Assessment & Plan       Nicotine use    Small bowel obstruction (HCC)    COPD (chronic obstructive pulmonary disease) (HCC)    Severe malnutrition (HCC)    S/P exploratory laparotomy with lysis of adhesions    Essential hypertension   -regular diet   -tylenol  PRN pain   -if does OK today may DC home tomorrow      JIN Schmidt  08/25/22  11:05 EDT    Electronically signed by JIN Schmidt, 08/25/22, 11:05 AM EDT.

## 2022-08-25 NOTE — PROGRESS NOTES
[facility]     Progress Note    Patient Name: Fanta Hawkins  : 1950  MRN: [id number]  Primary Care Physician:  Eduard Minaya MD  Date of admission: 2022      Subjective   Brief summary.  Admitted with SBO postsurgery several days ago.      HPI:  Patient feeling better tolerating food, advance to full diet today..  Seen by surgeon and plan for discharge tomorrow.  Family at bedside  Review of Systems     No fever chills.  No nausea  No chest pain, no abdominal pain  Blood pressure better        Objective     Vitals:   Temp:  [97.7 °F (36.5 °C)-98.8 °F (37.1 °C)] 98.8 °F (37.1 °C)  Heart Rate:  [78-94] 87  Resp:  [14-18] 14  BP: (139-177)/(75-87) 139/77    Physical Exam :     Elderly female not in acute distress  No pallor or icterus  Heart regular  Lungs clear  Abdomen soft nontender  Extremities no edema    Result Review:  I have personally reviewed the results from the time of this admission to 2022 16:33 EDT and agree with these findings:  [x]  Laboratory  []  Microbiology  []  Radiology  []  EKG/Telemetry   []  Cardiology/Vascular   []  Pathology  []  Old records  []  Other:           Assessment / Plan       Active Hospital Problems:  Active Hospital Problems    Diagnosis    • Essential hypertension    • S/P exploratory laparotomy with lysis of adhesions    • Severe malnutrition (HCC)    • COPD (chronic obstructive pulmonary disease) (HCC)    • Small bowel obstruction (HCC)    • Nicotine use        Plan:   Stable  Diet advanced.  Patient had a BM.  Doing better.  Blood pressure better.  Check labs in a.m..  Possible discharge tomorrow       DVT prophylaxis:  Medical DVT prophylaxis orders are present.    CODE STATUS:   Code Status (Patient has no pulse and is not breathing): CPR (Attempt to Resuscitate)  Medical Interventions (Patient has pulse or is breathing): Full Support              Electronically signed by Valentin Minaya MD, 22, 4:35 PM EDT.  .

## 2022-08-26 ENCOUNTER — READMISSION MANAGEMENT (OUTPATIENT)
Dept: CALL CENTER | Facility: HOSPITAL | Age: 72
End: 2022-08-26

## 2022-08-26 VITALS
RESPIRATION RATE: 16 BRPM | OXYGEN SATURATION: 93 % | HEART RATE: 76 BPM | WEIGHT: 99.87 LBS | HEIGHT: 62 IN | DIASTOLIC BLOOD PRESSURE: 80 MMHG | BODY MASS INDEX: 18.38 KG/M2 | TEMPERATURE: 97.9 F | SYSTOLIC BLOOD PRESSURE: 144 MMHG

## 2022-08-26 PROBLEM — K56.609 SMALL BOWEL OBSTRUCTION: Status: RESOLVED | Noted: 2022-08-11 | Resolved: 2022-08-26

## 2022-08-26 LAB
ALBUMIN SERPL-MCNC: 2.8 G/DL (ref 3.5–5.2)
ALBUMIN/GLOB SERPL: 1.1 G/DL
ALP SERPL-CCNC: 54 U/L (ref 39–117)
ALT SERPL W P-5'-P-CCNC: 9 U/L (ref 1–33)
ANION GAP SERPL CALCULATED.3IONS-SCNC: 6.8 MMOL/L (ref 5–15)
AST SERPL-CCNC: 12 U/L (ref 1–32)
BASOPHILS # BLD AUTO: 0.03 10*3/MM3 (ref 0–0.2)
BASOPHILS NFR BLD AUTO: 0.4 % (ref 0–1.5)
BILIRUB SERPL-MCNC: 0.3 MG/DL (ref 0–1.2)
BUN SERPL-MCNC: 11 MG/DL (ref 8–23)
BUN/CREAT SERPL: 18.6 (ref 7–25)
CALCIUM SPEC-SCNC: 8.7 MG/DL (ref 8.6–10.5)
CHLORIDE SERPL-SCNC: 103 MMOL/L (ref 98–107)
CO2 SERPL-SCNC: 29.2 MMOL/L (ref 22–29)
CREAT SERPL-MCNC: 0.59 MG/DL (ref 0.57–1)
DEPRECATED RDW RBC AUTO: 42.7 FL (ref 37–54)
EGFRCR SERPLBLD CKD-EPI 2021: 97.7 ML/MIN/1.73
EOSINOPHIL # BLD AUTO: 0.13 10*3/MM3 (ref 0–0.4)
EOSINOPHIL NFR BLD AUTO: 1.9 % (ref 0.3–6.2)
ERYTHROCYTE [DISTWIDTH] IN BLOOD BY AUTOMATED COUNT: 11.9 % (ref 12.3–15.4)
GLOBULIN UR ELPH-MCNC: 2.5 GM/DL
GLUCOSE SERPL-MCNC: 97 MG/DL (ref 65–99)
HCT VFR BLD AUTO: 30.5 % (ref 34–46.6)
HGB BLD-MCNC: 10.2 G/DL (ref 12–15.9)
IMM GRANULOCYTES # BLD AUTO: 0.02 10*3/MM3 (ref 0–0.05)
IMM GRANULOCYTES NFR BLD AUTO: 0.3 % (ref 0–0.5)
LYMPHOCYTES # BLD AUTO: 1.06 10*3/MM3 (ref 0.7–3.1)
LYMPHOCYTES NFR BLD AUTO: 15.4 % (ref 19.6–45.3)
MCH RBC QN AUTO: 32.8 PG (ref 26.6–33)
MCHC RBC AUTO-ENTMCNC: 33.4 G/DL (ref 31.5–35.7)
MCV RBC AUTO: 98.1 FL (ref 79–97)
MONOCYTES # BLD AUTO: 0.69 10*3/MM3 (ref 0.1–0.9)
MONOCYTES NFR BLD AUTO: 10 % (ref 5–12)
NEUTROPHILS NFR BLD AUTO: 4.94 10*3/MM3 (ref 1.7–7)
NEUTROPHILS NFR BLD AUTO: 72 % (ref 42.7–76)
NRBC BLD AUTO-RTO: 0 /100 WBC (ref 0–0.2)
PLATELET # BLD AUTO: 353 10*3/MM3 (ref 140–450)
PMV BLD AUTO: 8.9 FL (ref 6–12)
POTASSIUM SERPL-SCNC: 3.7 MMOL/L (ref 3.5–5.2)
PROT SERPL-MCNC: 5.3 G/DL (ref 6–8.5)
RBC # BLD AUTO: 3.11 10*6/MM3 (ref 3.77–5.28)
SODIUM SERPL-SCNC: 139 MMOL/L (ref 136–145)
WBC NRBC COR # BLD: 6.87 10*3/MM3 (ref 3.4–10.8)

## 2022-08-26 PROCEDURE — 80053 COMPREHEN METABOLIC PANEL: CPT | Performed by: INTERNAL MEDICINE

## 2022-08-26 PROCEDURE — 99024 POSTOP FOLLOW-UP VISIT: CPT | Performed by: NURSE PRACTITIONER

## 2022-08-26 PROCEDURE — 85025 COMPLETE CBC W/AUTO DIFF WBC: CPT | Performed by: INTERNAL MEDICINE

## 2022-08-26 RX ORDER — AMLODIPINE BESYLATE 2.5 MG/1
2.5 TABLET ORAL
Qty: 30 TABLET | Refills: 0 | Status: SHIPPED | OUTPATIENT
Start: 2022-08-27 | End: 2022-09-26

## 2022-08-26 RX ORDER — ONDANSETRON 4 MG/1
4 TABLET, ORALLY DISINTEGRATING ORAL EVERY 8 HOURS PRN
Qty: 20 TABLET | Refills: 0 | Status: SHIPPED | OUTPATIENT
Start: 2022-08-26 | End: 2022-09-05

## 2022-08-26 RX ADMIN — AMLODIPINE BESYLATE 2.5 MG: 2.5 TABLET ORAL at 10:14

## 2022-08-26 RX ADMIN — DOCUSATE SODIUM 100 MG: 100 CAPSULE, LIQUID FILLED ORAL at 10:03

## 2022-08-26 RX ADMIN — FAMOTIDINE 20 MG: 10 INJECTION INTRAVENOUS at 10:03

## 2022-08-26 RX ADMIN — Medication 10 ML: at 10:04

## 2022-08-26 NOTE — PROGRESS NOTES
"POST OP PROGRESS NOTE     Patient Name:  Fanta Hawkins  YOB: 1950  [id number]   LOS: 15 days   7 Days Post-Op  Patient Care Team:  Valentin Minaya MD as PCP - General (Internal Medicine)          Subjective     Interval History:  VSS, Afebrile, tolerating regular diet, pain controlled with tylenol, having BMs and flatus.  Ambulating in hallway without difficulty.       Review of Systems:    All complete review of systems was performed and all are negative except what is documented in the HPI.       Objective     Constitutuional:  well nourished, no acute distress, appear stated age /80 (BP Location: Right arm, Patient Position: Lying)   Pulse 76   Temp 97.9 °F (36.6 °C) (Oral)   Resp 16   Ht 157.5 cm (62\")   Wt 45.3 kg (99 lb 13.9 oz)   SpO2 93%   BMI 18.27 kg/m²    Eyes:  anicteric sclerae, moist conjunctivae, no lid lag, PERRLA  ENMT:  oropharynx clear, moist mucous membranes, good dentition  Neck:   full ROM, trachea midline, no thyromegaly  Cardiovascular: RRR, S1 and S2 present, no MRG's, heart rate 76, no  pedal edema  Respiratory: lungs CTA, respirations even and unlabored  GI:  Abdomen soft,approp tender, nondistended, midline incision with staples  intact, no redness or drainage from incision no HSM     Skin:  warm and dry, normal turgor, no rashes  Psychiatric:  alert and oriented x3, intact judgement and insight, cooperative          Results Review:       I reviewed the patient's new clinical results including  CBC and BMP.     WBC   Date Value Ref Range Status   08/26/2022 6.87 3.40 - 10.80 10*3/mm3 Final     RBC   Date Value Ref Range Status   08/26/2022 3.11 (L) 3.77 - 5.28 10*6/mm3 Final     Hemoglobin   Date Value Ref Range Status   08/26/2022 10.2 (L) 12.0 - 15.9 g/dL Final     Hematocrit   Date Value Ref Range Status   08/26/2022 30.5 (L) 34.0 - 46.6 % Final     MCV   Date Value Ref Range Status   08/26/2022 98.1 (H) 79.0 - 97.0 fL Final     MCH   Date Value Ref Range " Status   08/26/2022 32.8 26.6 - 33.0 pg Final     MCHC   Date Value Ref Range Status   08/26/2022 33.4 31.5 - 35.7 g/dL Final     RDW   Date Value Ref Range Status   08/26/2022 11.9 (L) 12.3 - 15.4 % Final     RDW-SD   Date Value Ref Range Status   08/26/2022 42.7 37.0 - 54.0 fl Final     MPV   Date Value Ref Range Status   08/26/2022 8.9 6.0 - 12.0 fL Final     Platelets   Date Value Ref Range Status   08/26/2022 353 140 - 450 10*3/mm3 Final     Neutrophil %   Date Value Ref Range Status   08/26/2022 72.0 42.7 - 76.0 % Final     Lymphocyte %   Date Value Ref Range Status   08/26/2022 15.4 (L) 19.6 - 45.3 % Final     Monocyte %   Date Value Ref Range Status   08/26/2022 10.0 5.0 - 12.0 % Final     Eosinophil %   Date Value Ref Range Status   08/26/2022 1.9 0.3 - 6.2 % Final     Basophil %   Date Value Ref Range Status   08/26/2022 0.4 0.0 - 1.5 % Final     Immature Grans %   Date Value Ref Range Status   08/26/2022 0.3 0.0 - 0.5 % Final     Neutrophils, Absolute   Date Value Ref Range Status   08/26/2022 4.94 1.70 - 7.00 10*3/mm3 Final     Lymphocytes, Absolute   Date Value Ref Range Status   08/26/2022 1.06 0.70 - 3.10 10*3/mm3 Final     Monocytes, Absolute   Date Value Ref Range Status   08/26/2022 0.69 0.10 - 0.90 10*3/mm3 Final     Eosinophils, Absolute   Date Value Ref Range Status   08/26/2022 0.13 0.00 - 0.40 10*3/mm3 Final     Basophils, Absolute   Date Value Ref Range Status   08/26/2022 0.03 0.00 - 0.20 10*3/mm3 Final     Immature Grans, Absolute   Date Value Ref Range Status   08/26/2022 0.02 0.00 - 0.05 10*3/mm3 Final     nRBC   Date Value Ref Range Status   08/26/2022 0.0 0.0 - 0.2 /100 WBC Final         Basic Metabolic Panel    Sodium Sodium   Date Value Ref Range Status   08/26/2022 139 136 - 145 mmol/L Final   08/24/2022 139 136 - 145 mmol/L Final      Potassium Potassium   Date Value Ref Range Status   08/26/2022 3.7 3.5 - 5.2 mmol/L Final   08/24/2022 3.7 3.5 - 5.2 mmol/L Final      Chloride  Chloride   Date Value Ref Range Status   08/26/2022 103 98 - 107 mmol/L Final   08/24/2022 104 98 - 107 mmol/L Final      Bicarbonate No results found for: PLASMABICARB   BUN BUN   Date Value Ref Range Status   08/26/2022 11 8 - 23 mg/dL Final   08/24/2022 5 (L) 8 - 23 mg/dL Final      Creatinine Creatinine   Date Value Ref Range Status   08/26/2022 0.59 0.57 - 1.00 mg/dL Final   08/24/2022 0.61 0.57 - 1.00 mg/dL Final      Calcium Calcium   Date Value Ref Range Status   08/26/2022 8.7 8.6 - 10.5 mg/dL Final   08/24/2022 8.6 8.6 - 10.5 mg/dL Final      Glucose      No components found for: GLUCOSE.*       Lab Results   Component Value Date    GLUCOSE 97 08/26/2022    BUN 11 08/26/2022    CREATININE 0.59 08/26/2022    BCR 18.6 08/26/2022    K 3.7 08/26/2022    CO2 29.2 (H) 08/26/2022    CALCIUM 8.7 08/26/2022    ALBUMIN 2.80 (L) 08/26/2022    LABIL2 1.1 (L) 05/09/2019    AST 12 08/26/2022    ALT 9 08/26/2022       IMAGING:  Imaging Results (Last 72 Hours)     ** No results found for the last 72 hours. **          Medications:    Current Facility-Administered Medications:   •  acetaminophen (TYLENOL) tablet 650 mg, 650 mg, Oral, Q4H PRN, 650 mg at 08/25/22 6575 **OR** acetaminophen (TYLENOL) 160 MG/5ML solution 650 mg, 650 mg, Oral, Q4H PRN **OR** acetaminophen (TYLENOL) suppository 650 mg, 650 mg, Rectal, Q4H PRN, Humera Walters  •  aluminum-magnesium hydroxide-simethicone (MAALOX MAX) 400-400-40 MG/5ML suspension 15 mL, 15 mL, Oral, Q6H PRN, Humera Walters  •  amLODIPine (NORVASC) tablet 2.5 mg, 2.5 mg, Oral, Q24H, Valentin Minaya MD, 2.5 mg at 08/26/22 1014  •  docusate sodium (COLACE) capsule 100 mg, 100 mg, Oral, Daily, Maria C Aranda APRN, 100 mg at 08/26/22 1003  •  Enoxaparin Sodium (LOVENOX) syringe 30 mg, 30 mg, Subcutaneous, Q24H, Snehal Medina MD, 30 mg at 08/25/22 1225  •  famotidine (PEPCID) injection 20 mg, 20 mg, Intravenous, Q12H, Snehal Medina MD, 20 mg at 08/26/22 1003  •   ipratropium-albuterol (DUO-NEB) nebulizer solution 3 mL, 3 mL, Nebulization, Q4H PRN, Snehal Medina MD  •  melatonin tablet 5 mg, 5 mg, Oral, Nightly PRN, Humera Walters, 5 mg at 22 2245  •  [] HYDROmorphone (DILAUDID) injection 0.5 mg, 0.5 mg, Intravenous, Q2H PRN, 0.5 mg at 22 0418 **AND** naloxone (NARCAN) injection 0.4 mg, 0.4 mg, Intravenous, Q5 Min PRN, Snehal Medina MD  •  ondansetron (ZOFRAN) injection 4 mg, 4 mg, Intravenous, Q4H PRN, Snehal Medina MD, 4 mg at 22 1928  •  ondansetron (ZOFRAN) injection 4 mg, 4 mg, Intravenous, Q6H PRN, Snehal Medina MD  •  phenol (CHLORASEPTIC) 1.4 % liquid 1 spray, 1 spray, Mouth/Throat, Q2H PRN, Snehal Medina MD, 1 spray at 22 1714  •  promethazine (PHENERGAN) tablet 12.5 mg, 12.5 mg, Oral, Q6H PRN, Humera Walters  •  sodium chloride 0.9 % flush 10 mL, 10 mL, Intravenous, PRN, Snehal Medina MD  •  sodium chloride 0.9 % flush 10 mL, 10 mL, Intravenous, PRN, Snehal Medina MD  •  sodium chloride 0.9 % flush 10 mL, 10 mL, Intravenous, Q12H, Snehal Medina MD, 10 mL at 22 1004  •  sodium chloride 0.9 % infusion 40 mL, 40 mL, Intravenous, PRN, Snehal Medina MD  •  sodium chloride 0.9 % infusion, 9 mL/hr, Intravenous, Continuous PRN, Snehal Medina MD    Assessment & Plan       Nicotine use    COPD (chronic obstructive pulmonary disease) (HCC)    Severe malnutrition (HCC)    S/P exploratory laparotomy with lysis of adhesions    Essential hypertension   -OK to DC home   -ok to shower   -no lifting anything heavier than a gallon of milk for 1 month   -no narcotic script for home, pain controlled with tylenol   -Follow up with Dr Medina in 1 week          Electronically signed by JIN Schmidt, 22, 10:39 AM EDT.

## 2022-08-26 NOTE — PLAN OF CARE
Goal Outcome Evaluation:  Plan of Care Reviewed With: patient        Progress: improving   Patient has mild nausea, responds well to IV zofran. Pain controlled with oral tylenol. VSS, states that the melatonin she received made her sweat, no respiratory distress or indications of allergic reaction.

## 2022-08-26 NOTE — PLAN OF CARE
Goal Outcome Evaluation:  Plan of Care Reviewed With: patient, spouse, son        Progress: improving     Patient is discharging. Keith, rn

## 2022-08-26 NOTE — DISCHARGE SUMMARY
[facility]         DISCHARGE SUMMARY    Patient Name: Fanta Hawkins  : 1950  MRN: [id number]    Date of Admission: 2022  Date of Discharge:   Primary Care Physician: Valentin Minaya MD    Consults     Date and Time Order Name Status Description    2022  8:49 AM IP General Consult (Use specialty-specific consult if known)            Presenting Problem:   Small bowel obstruction (HCC) [K56.609]    Active and Resolved Hospital Problems:  Active Hospital Problems    Diagnosis POA   • Essential hypertension [I10] No   • S/P exploratory laparotomy with lysis of adhesions [Z98.890] Not Applicable   • Severe malnutrition (HCC) [E43] Yes   • COPD (chronic obstructive pulmonary disease) (HCC) [J44.9] Yes   • Nicotine use [Z72.0] Yes      Resolved Hospital Problems    Diagnosis POA   • Small bowel obstruction (HCC) [K56.609] Yes         Hospital Course     Hospital Course:  Fanta Hawkins is a 71 y.o. female admitted for abdominal pain and bowel obstruction.  She was admitted by Dr. Vieira, subsequently she was transferred to [facility] 3 days before her discharge.  Patient had surgery after few days of conservative treatment.  Post surgery she had slow recovery and bowel movements return to function after few days.  For last 2 days patient is able to move bowels as well as having normal diet.  Her blood pressure was slightly high for which she was started on amlodipine and she is tolerating it well.  She feels much better and ready for discharge today, she was cleared by surgical team.  She will be discharged home today with outpatient follow-up recommendations.          DISCHARGE Follow Up Recommendations for labs and diagnostics:   Discharge to home.  Heart healthy diet      Day of Discharge     Vital Signs:  Temp:  [97.9 °F (36.6 °C)-98.8 °F (37.1 °C)] 97.9 °F (36.6 °C)  Heart Rate:  [76-90] 76  Resp:  [14-16] 16  BP: (120-144)/(66-80) 144/80    Physical Exam:    Elderly female  not in acute distress.  Heart regular.  Lungs clear.  Abdomen soft nontender.  Surgical site clean.  Extremities no edema      Pertinent  and/or Most Recent Results     LAB RESULTS:      Lab 08/26/22 0426 08/25/22  0435 08/24/22  0440 08/23/22  0440 08/22/22  0545   WBC 6.87 7.80 7.78 8.05 8.83   HEMOGLOBIN 10.2* 10.7* 11.0* 11.3* 11.2*   HEMATOCRIT 30.5* 31.4* 32.5* 33.5* 33.5*   PLATELETS 353 320 345 350 339   NEUTROS ABS 4.94 5.45 5.48 6.19 6.74   IMMATURE GRANS (ABS) 0.02 0.03 0.04 0.05 0.05   LYMPHS ABS 1.06 1.29 1.31 1.00 1.05   MONOS ABS 0.69 0.83 0.76 0.65 0.78   EOS ABS 0.13 0.16 0.15 0.13 0.18   MCV 98.1* 97.5* 97.3* 97.4* 98.2*         Lab 08/26/22  0426 08/24/22  0440 08/23/22  0440 08/22/22  0545 08/20/22  0416   SODIUM 139 139 138 137 138   POTASSIUM 3.7 3.7 3.8 3.9 4.3   CHLORIDE 103 104 103 104 103   CO2 29.2* 26.2 25.3 24.6 23.8   ANION GAP 6.8 8.8 9.7 8.4 11.2   BUN 11 5* 4* 4* 9   CREATININE 0.59 0.61 0.64 0.59 0.75   EGFR 97.7 96.9 95.8 97.7 86.3   GLUCOSE 97 107* 110* 118* 180*   CALCIUM 8.7 8.6 8.9 8.7 7.9*   MAGNESIUM  --   --  1.7  --  1.6   PHOSPHORUS  --   --  2.9  --  2.8   TSH  --   --  0.770  --   --          Lab 08/26/22  0426 08/23/22  0440 08/20/22  0416   TOTAL PROTEIN 5.3* 5.7* 5.6*   ALBUMIN 2.80* 2.70* 2.90*   GLOBULIN 2.5 3.0 2.7   ALT (SGPT) 9 9 16   AST (SGOT) 12 10 20   BILIRUBIN 0.3 0.4 0.3   ALK PHOS 54 59 64                     Brief Urine Lab Results  (Last result in the past 365 days)      Color   Clarity   Blood   Leuk Est   Nitrite   Protein   CREAT   Urine HCG        08/12/22 0111 Yellow   Clear   Trace   Small (1+)   Negative   Negative               Microbiology Results (last 10 days)     ** No results found for the last 240 hours. **          PROCEDURES:    [unfilled]    XR Abdomen Flat & Upright    Result Date: 8/17/2022  Impression:  Stable small-bowel distention concerning for ongoing obstruction.     MELODIE NGUYEN MD       Electronically Signed and Approved By:  MELODIE NGUYEN MD on 8/17/2022 at 6:03             XR Abdomen Flat & Upright    Result Date: 8/16/2022  Impression:   1. Persistent small bowel distention concerning for ongoing obstruction. 2. Gastric suction tube terminates in the stomach.     MELODIE NGUYEN MD       Electronically Signed and Approved By: MELODIE NGUYEN MD on 8/16/2022 at 5:40             CT Abdomen Pelvis With Contrast    Result Date: 8/18/2022  Impression:   1. There are findings that would suggest the presence of a distal small bowel obstruction.  There is no obvious obstructing lesion or clearly identifiable transition point.  2. There is a small to moderate volume of ascites, likely reactive. 3. Mild anasarca.  Moderate right and small left pleural effusions with overlying atelectasis. 4. Gastric suction tube in the stomach with gastric and duodenal decompression. 5. Thoracolumbar degenerative changes and postoperative changes in the lumbar spine.     MELODIE NGUYEN MD       Electronically Signed and Approved By: MELODIE NGUYEN MD on 8/18/2022 at 21:41             CT Abdomen Pelvis With Contrast    Result Date: 8/11/2022  Impression:   1. Distended and borderline dilated loops of fluid-filled small bowel with transition to nondilated distal small bowel in the lower central pelvis.  Findings appear similar to the exam from 2019 and are suspicious for recurrent early or partial small bowel obstruction. 2. Small amount of free fluid without significant ectopic bowel gas seen at this time. 3. Multiple small renal hypodensities, too small to accurately assess, but statistically most likely benign. 4. Small hiatal hernia. 5. Diverticulosis.     GALA LOAIZA MD       Electronically Signed and Approved By: GALA LOAIZA MD on 8/11/2022 at 8:39             XR Chest 1 View    Result Date: 8/18/2022  Impression:   NG tube tip is in the proximal esophagus, at the level of the aortic arch.       VAMSHI CANO MD       Electronically Signed and Approved  By: VAMSHI CANO MD on 8/18/2022 at 13:39             XR Abdomen KUB    Result Date: 8/15/2022  Impression:  NG tube with tip in stomach.     SHARDA MANUEL MD       Electronically Signed and Approved By: SHARDA MANUEL MD on 8/15/2022 at 16:34             XR Abdomen KUB    Result Date: 8/15/2022  Impression:   1. Dilated gas-filled small bowel loops with relative paucity of large bowel gas most concerning for small bowel obstruction.  Small bowel measures up to 3.6 centimeters may be slightly greater than prior CT scan 8/11/2022. 2. The upper abdomen and NG tube were not imaged.     LEEANN GRIFFIN MD       Electronically Signed and Approved By: LEEANN GRIFFIN MD on 8/15/2022 at 10:07                           Labs Pending at Discharge:        Discharge Details        Discharge Medications      New Medications      Instructions Start Date   amLODIPine 2.5 MG tablet  Commonly known as: NORVASC   2.5 mg, Oral, Every 24 Hours Scheduled   Start Date: August 27, 2022     ondansetron ODT 4 MG disintegrating tablet  Commonly known as: Zofran ODT   4 mg, Translingual, Every 8 Hours PRN         Continue These Medications      Instructions Start Date   albuterol sulfate  (90 Base) MCG/ACT inhaler  Commonly known as: PROVENTIL HFA;VENTOLIN HFA;PROAIR HFA   2 puffs, Inhalation, Every 4 Hours PRN      docusate sodium 100 MG capsule   100 mg, Oral, 2 Times Daily PRN      fluticasone 50 MCG/ACT nasal spray  Commonly known as: FLONASE   1 spray, Each Nare, Daily PRN      HYDROcodone-acetaminophen 5-325 MG per tablet  Commonly known as: NORCO   1 tablet, Oral, Every 12 Hours PRN      omeprazole 10 MG capsule  Commonly known as: prilOSEC   10 mg, Oral, Daily PRN         Stop These Medications    diclofenac 50 MG EC tablet  Commonly known as: VOLTAREN            Allergies   Allergen Reactions   • Indomethacin Palpitations and Shortness Of Breath   • Meloxicam Itching and Shortness Of Breath   • Avelox [Moxifloxacin]  Seizure   • Morphine Hives and Itching         Discharge Disposition:  Discharge to home with outpatient follow-up      Diet:    Heart healthy diet    Discharge Activity:     Activity Instructions     Activity as Tolerated              No future appointments.    Additional Instructions for the Follow-ups that You Need to Schedule     Discharge Follow-up with PCP   As directed       Currently Documented PCP:    Valentin Minaya MD    PCP Phone Number:    [phone]     Follow Up Details: With Dr. Valentin Minaya, on Wednesday, August 31 at 1245         Discharge Follow-up with Specified Provider: Dr. Medina as recommended in 1 to 2 weeks   As directed      To: Dr. Medina as recommended in 1 to 2 weeks               Time spent on Discharge including face to face service: 31 minutes.            I have dictated this note utilizing Dragon Dictation.             Please note that portions of this note were completed with a voice recognition program.             Part of this note may be an electronic transcription/translation of spoken language to printed text         using the Dragon Dictation System.       Electronically signed by Valentin Minaya MD, 08/26/22, 10:38 AM EDT.

## 2022-08-27 NOTE — OUTREACH NOTE
Prep Survey    Flowsheet Row Responses   [facility] facility patient discharged from? [facility]   Is LACE score < 7 ? No   Emergency Room discharge w/ pulse ox? No   Eligibility Readm Mgmt   Discharge diagnosis  exploratory laparotomy with lysis of adhesions    Does the patient have one of the following disease processes/diagnoses(primary or secondary)? General Surgery   Does the patient have Home health ordered? Yes   What is the Home health agency?  [facility]   Comments regarding appointments [facility] for a walker   Prep survey completed? Yes          BRENT FERRER - Registered Nurse

## 2022-08-29 ENCOUNTER — READMISSION MANAGEMENT (OUTPATIENT)
Dept: CALL CENTER | Facility: HOSPITAL | Age: 72
End: 2022-08-29

## 2022-08-29 NOTE — OUTREACH NOTE
General Surgery Week 1 Survey    Flowsheet Row Responses   [facility] facility patient discharged from? [facility]   Does the patient have one of the following disease processes/diagnoses(primary or secondary)? General Surgery   Week 1 attempt successful? No   Unsuccessful attempts Attempt 1          IRMA Pate Registered Nurse

## 2022-09-01 ENCOUNTER — READMISSION MANAGEMENT (OUTPATIENT)
Dept: CALL CENTER | Facility: HOSPITAL | Age: 72
End: 2022-09-01

## 2022-09-01 ENCOUNTER — OFFICE VISIT (OUTPATIENT)
Dept: SURGERY | Facility: CLINIC | Age: 72
End: 2022-09-01

## 2022-09-01 VITALS — BODY MASS INDEX: 16.86 KG/M2 | WEIGHT: 91.6 LBS | HEIGHT: 62 IN | RESPIRATION RATE: 16 BRPM

## 2022-09-01 DIAGNOSIS — Z98.890 S/P EXPLORATORY LAPAROTOMY: Primary | ICD-10-CM

## 2022-09-01 PROCEDURE — 99024 POSTOP FOLLOW-UP VISIT: CPT | Performed by: SURGERY

## 2022-09-01 RX ORDER — BUSPIRONE HYDROCHLORIDE 10 MG/1
10 TABLET ORAL 2 TIMES DAILY
COMMUNITY
Start: 2022-06-24

## 2022-09-01 RX ORDER — DICLOFENAC SODIUM 75 MG/1
TABLET, DELAYED RELEASE ORAL
COMMUNITY
Start: 2022-06-24

## 2022-09-01 RX ORDER — ACETAMINOPHEN 160 MG
2000 TABLET,DISINTEGRATING ORAL DAILY
COMMUNITY
Start: 2022-06-24

## 2022-09-01 NOTE — OUTREACH NOTE
General Surgery Week 1 Survey    Flowsheet Row Responses   [facility] discharged from? [facility]   Does the patient have one of the following disease processes/diagnoses(primary or secondary)? General Surgery   Week 1 attempt successful? No   Unsuccessful attempts Attempt 2  [attempted  pt and son]          HELEN BABB - Registered Nurse

## 2022-09-01 NOTE — PROGRESS NOTES
"Chief Complaint  Follow-up    Subjective          History of Present Illness  The patient is here to follow up on ex lap CONSTANZA.  They are doing well and have no complaints. Staples removed.         Objective   Vital Signs:   Resp 16   Ht 157.5 cm (62\")   Wt 41.5 kg (91 lb 9.6 oz)   BMI 16.75 kg/m²     Physical Exam  Vitals and nursing note reviewed.   Constitutional:       General: She is not in acute distress.     Appearance: Normal appearance. She is well-developed.   HENT:      Head: Normocephalic and atraumatic.   Eyes:      Extraocular Movements: Extraocular movements intact.      Pupils: Pupils are equal, round, and reactive to light.   Cardiovascular:      Pulses: Normal pulses.   Pulmonary:      Effort: Pulmonary effort is normal. No retractions.      Breath sounds: Normal air entry. No wheezing.   Abdominal:      General: There is no distension.      Palpations: Abdomen is soft.      Tenderness: There is no abdominal tenderness.      Hernia: No hernia is present.   Musculoskeletal:         General: No swelling or deformity.      Cervical back: Neck supple.   Skin:     General: Skin is warm and dry.      Findings: No erythema.      Comments: Surgical Incision Healing Well   Neurological:      General: No focal deficit present.      Mental Status: She is alert and oriented to person, place, and time.      Motor: Motor function is intact.   Psychiatric:         Mood and Affect: Mood normal.         Thought Content: Thought content normal.            Result Review :                 Assessment and Plan {CC Problem List  Visit Diagnosis   ROS  Review (Popup)  Health Maintenance  Quality  BestPractice  Medications  SmartSets  SnapShot Encounters  Media :23}   Diagnoses and all orders for this visit:    1. S/P exploratory laparotomy with lysis of adhesions (Primary)      Followup prn    Follow Up   Return if symptoms worsen or fail to improve.  Patient was given instructions and counseling regarding " her condition or for health maintenance advice. Please see specific information pulled into the AVS if appropriate.

## 2022-09-08 ENCOUNTER — READMISSION MANAGEMENT (OUTPATIENT)
Dept: CALL CENTER | Facility: HOSPITAL | Age: 72
End: 2022-09-08

## 2022-09-08 NOTE — OUTREACH NOTE
General Surgery Week 2 Survey    Flowsheet Row Responses   [facility] patient discharged from? [facility]   Does the patient have one of the following disease processes/diagnoses(primary or secondary)? General Surgery   Week 2 attempt successful? Yes   Call start time 1012   Call end time 1015   Discharge diagnosis  exploratory laparotomy with lysis of adhesions    Does the patient have all medications related to this admission filled (includes all antibiotics, pain medications, etc.) Yes   Is the patient taking all medications as directed (includes completed medication regime)? Yes   Does the patient have a follow up appointment scheduled with their surgeon? Yes   Has the patient kept scheduled appointments due by today? Yes   Comments seen surgeon on 9/1,  has appt with PCP today   What is the Home health agency?  [facility]   Has home health visited the patient within 72 hours of discharge? No   Home health comments declined home health   Psychosocial issues? No   Did the patient receive a copy of their discharge instructions? Yes   What is the patient's perception of their health status since discharge? New symptoms unrelated to diagnosis  [having some constipation issues, seeing her PCP today and will discuss with them]   Nursing interventions Nurse provided patient education   Is the patient/caregiver able to teach back the hierarchy of who to call/visit for symptoms/problems? PCP, Specialist, Home health nurse, Urgent Care, ED, 911 Yes   Week 2 call completed? Yes   Wrap up additional comments Doing a little better but is having some constipation issues, she is going to discuss with her PCP today.          SAHRA GREENE - Registered Nurse

## 2023-02-01 ENCOUNTER — LAB (OUTPATIENT)
Dept: LAB | Facility: HOSPITAL | Age: 73
End: 2023-02-01
Payer: MEDICARE

## 2023-02-01 ENCOUNTER — TRANSCRIBE ORDERS (OUTPATIENT)
Dept: LAB | Facility: HOSPITAL | Age: 73
End: 2023-02-01
Payer: MEDICARE

## 2023-02-01 DIAGNOSIS — R79.9 ABNORMAL BLOOD CHEMISTRY: ICD-10-CM

## 2023-02-01 DIAGNOSIS — R79.9 ABNORMAL BLOOD CHEMISTRY: Primary | ICD-10-CM

## 2023-02-01 LAB
ANION GAP SERPL CALCULATED.3IONS-SCNC: 8.7 MMOL/L (ref 5–15)
BUN SERPL-MCNC: 26 MG/DL (ref 8–23)
BUN/CREAT SERPL: 28 (ref 7–25)
CALCIUM SPEC-SCNC: 9.1 MG/DL (ref 8.6–10.5)
CHLORIDE SERPL-SCNC: 106 MMOL/L (ref 98–107)
CO2 SERPL-SCNC: 28.3 MMOL/L (ref 22–29)
CREAT SERPL-MCNC: 0.93 MG/DL (ref 0.57–1)
EGFRCR SERPLBLD CKD-EPI 2021: 66.3 ML/MIN/1.73
GLUCOSE SERPL-MCNC: 96 MG/DL (ref 65–99)
POTASSIUM SERPL-SCNC: 4.4 MMOL/L (ref 3.5–5.2)
SODIUM SERPL-SCNC: 143 MMOL/L (ref 136–145)

## 2023-02-01 PROCEDURE — 80048 BASIC METABOLIC PNL TOTAL CA: CPT

## 2023-02-01 PROCEDURE — 36415 COLL VENOUS BLD VENIPUNCTURE: CPT

## 2023-04-17 ENCOUNTER — APPOINTMENT (OUTPATIENT)
Dept: GENERAL RADIOLOGY | Facility: HOSPITAL | Age: 73
End: 2023-04-17
Payer: MEDICARE

## 2023-04-17 ENCOUNTER — TRANSCRIBE ORDERS (OUTPATIENT)
Dept: ADMINISTRATIVE | Facility: HOSPITAL | Age: 73
End: 2023-04-17
Payer: MEDICARE

## 2023-04-17 ENCOUNTER — APPOINTMENT (OUTPATIENT)
Dept: CT IMAGING | Facility: HOSPITAL | Age: 73
End: 2023-04-17
Payer: MEDICARE

## 2023-04-17 ENCOUNTER — HOSPITAL ENCOUNTER (OUTPATIENT)
Facility: HOSPITAL | Age: 73
Setting detail: OBSERVATION
Discharge: HOME | End: 2023-04-19
Attending: EMERGENCY MEDICINE | Admitting: STUDENT IN AN ORGANIZED HEALTH CARE EDUCATION/TRAINING PROGRAM
Payer: MEDICARE

## 2023-04-17 DIAGNOSIS — I99.9 DISORDER OF CIRCULATORY SYSTEM: Primary | ICD-10-CM

## 2023-04-17 DIAGNOSIS — R42 VERTIGO: Primary | ICD-10-CM

## 2023-04-17 LAB
ALBUMIN SERPL-MCNC: 4.2 G/DL (ref 3.5–5.2)
ALBUMIN/GLOB SERPL: 1.3 G/DL
ALP SERPL-CCNC: 94 U/L (ref 39–117)
ALT SERPL W P-5'-P-CCNC: 9 U/L (ref 1–33)
ANION GAP SERPL CALCULATED.3IONS-SCNC: 10.1 MMOL/L (ref 5–15)
AST SERPL-CCNC: 15 U/L (ref 1–32)
BASOPHILS # BLD AUTO: 0.03 10*3/MM3 (ref 0–0.2)
BASOPHILS NFR BLD AUTO: 0.4 % (ref 0–1.5)
BILIRUB SERPL-MCNC: 0.4 MG/DL (ref 0–1.2)
BILIRUB UR QL STRIP: NEGATIVE
BUN SERPL-MCNC: 19 MG/DL (ref 8–23)
BUN/CREAT SERPL: 28.8 (ref 7–25)
CALCIUM SPEC-SCNC: 9.7 MG/DL (ref 8.6–10.5)
CHLORIDE SERPL-SCNC: 101 MMOL/L (ref 98–107)
CLARITY UR: CLEAR
CO2 SERPL-SCNC: 27.9 MMOL/L (ref 22–29)
COLOR UR: YELLOW
CREAT SERPL-MCNC: 0.66 MG/DL (ref 0.57–1)
DEPRECATED RDW RBC AUTO: 44.4 FL (ref 37–54)
EGFRCR SERPLBLD CKD-EPI 2021: 94.5 ML/MIN/1.73
EOSINOPHIL # BLD AUTO: 0.06 10*3/MM3 (ref 0–0.4)
EOSINOPHIL NFR BLD AUTO: 0.8 % (ref 0.3–6.2)
ERYTHROCYTE [DISTWIDTH] IN BLOOD BY AUTOMATED COUNT: 12 % (ref 12.3–15.4)
GLOBULIN UR ELPH-MCNC: 3.3 GM/DL
GLUCOSE SERPL-MCNC: 99 MG/DL (ref 65–99)
GLUCOSE UR STRIP-MCNC: NEGATIVE MG/DL
HCT VFR BLD AUTO: 37.6 % (ref 34–46.6)
HGB BLD-MCNC: 12.7 G/DL (ref 12–15.9)
HGB UR QL STRIP.AUTO: NEGATIVE
HOLD SPECIMEN: NORMAL
HOLD SPECIMEN: NORMAL
IMM GRANULOCYTES # BLD AUTO: 0.02 10*3/MM3 (ref 0–0.05)
IMM GRANULOCYTES NFR BLD AUTO: 0.3 % (ref 0–0.5)
KETONES UR QL STRIP: NEGATIVE
LEUKOCYTE ESTERASE UR QL STRIP.AUTO: NEGATIVE
LYMPHOCYTES # BLD AUTO: 1.23 10*3/MM3 (ref 0.7–3.1)
LYMPHOCYTES NFR BLD AUTO: 16.9 % (ref 19.6–45.3)
MAGNESIUM SERPL-MCNC: 2 MG/DL (ref 1.6–2.4)
MCH RBC QN AUTO: 33.8 PG (ref 26.6–33)
MCHC RBC AUTO-ENTMCNC: 33.8 G/DL (ref 31.5–35.7)
MCV RBC AUTO: 100 FL (ref 79–97)
MONOCYTES # BLD AUTO: 0.52 10*3/MM3 (ref 0.1–0.9)
MONOCYTES NFR BLD AUTO: 7.2 % (ref 5–12)
NEUTROPHILS NFR BLD AUTO: 5.4 10*3/MM3 (ref 1.7–7)
NEUTROPHILS NFR BLD AUTO: 74.4 % (ref 42.7–76)
NITRITE UR QL STRIP: NEGATIVE
NRBC BLD AUTO-RTO: 0 /100 WBC (ref 0–0.2)
PH UR STRIP.AUTO: 5.5 [PH] (ref 5–8)
PLATELET # BLD AUTO: 286 10*3/MM3 (ref 140–450)
PMV BLD AUTO: 8.9 FL (ref 6–12)
POTASSIUM SERPL-SCNC: 4.4 MMOL/L (ref 3.5–5.2)
PROT SERPL-MCNC: 7.5 G/DL (ref 6–8.5)
PROT UR QL STRIP: NEGATIVE
RBC # BLD AUTO: 3.76 10*6/MM3 (ref 3.77–5.28)
SODIUM SERPL-SCNC: 139 MMOL/L (ref 136–145)
SP GR UR STRIP: 1.01 (ref 1–1.03)
TROPONIN T SERPL HS-MCNC: 10 NG/L
UROBILINOGEN UR QL STRIP: NORMAL
WBC NRBC COR # BLD: 7.26 10*3/MM3 (ref 3.4–10.8)
WHOLE BLOOD HOLD COAG: NORMAL
WHOLE BLOOD HOLD SPECIMEN: NORMAL

## 2023-04-17 PROCEDURE — 81003 URINALYSIS AUTO W/O SCOPE: CPT | Performed by: EMERGENCY MEDICINE

## 2023-04-17 PROCEDURE — 25010000002 ONDANSETRON PER 1 MG: Performed by: INTERNAL MEDICINE

## 2023-04-17 PROCEDURE — G0378 HOSPITAL OBSERVATION PER HR: HCPCS

## 2023-04-17 PROCEDURE — 71045 X-RAY EXAM CHEST 1 VIEW: CPT

## 2023-04-17 PROCEDURE — 96374 THER/PROPH/DIAG INJ IV PUSH: CPT

## 2023-04-17 PROCEDURE — 84484 ASSAY OF TROPONIN QUANT: CPT | Performed by: EMERGENCY MEDICINE

## 2023-04-17 PROCEDURE — 70450 CT HEAD/BRAIN W/O DYE: CPT

## 2023-04-17 PROCEDURE — 83735 ASSAY OF MAGNESIUM: CPT | Performed by: EMERGENCY MEDICINE

## 2023-04-17 PROCEDURE — 80053 COMPREHEN METABOLIC PANEL: CPT | Performed by: EMERGENCY MEDICINE

## 2023-04-17 PROCEDURE — 93005 ELECTROCARDIOGRAM TRACING: CPT | Performed by: EMERGENCY MEDICINE

## 2023-04-17 PROCEDURE — 99285 EMERGENCY DEPT VISIT HI MDM: CPT

## 2023-04-17 PROCEDURE — 36415 COLL VENOUS BLD VENIPUNCTURE: CPT | Performed by: EMERGENCY MEDICINE

## 2023-04-17 PROCEDURE — 85025 COMPLETE CBC W/AUTO DIFF WBC: CPT | Performed by: EMERGENCY MEDICINE

## 2023-04-17 RX ORDER — MECLIZINE HYDROCHLORIDE 25 MG/1
25 TABLET ORAL ONCE
Status: COMPLETED | OUTPATIENT
Start: 2023-04-17 | End: 2023-04-17

## 2023-04-17 RX ORDER — MECLIZINE HYDROCHLORIDE 25 MG/1
25 TABLET ORAL DAILY PRN
COMMUNITY

## 2023-04-17 RX ORDER — TIZANIDINE 4 MG/1
4 TABLET ORAL DAILY PRN
COMMUNITY

## 2023-04-17 RX ORDER — SODIUM CHLORIDE 0.9 % (FLUSH) 0.9 %
10 SYRINGE (ML) INJECTION AS NEEDED
Status: DISCONTINUED | OUTPATIENT
Start: 2023-04-17 | End: 2023-04-19 | Stop reason: HOSPADM

## 2023-04-17 RX ORDER — ONDANSETRON 2 MG/ML
4 INJECTION INTRAMUSCULAR; INTRAVENOUS EVERY 6 HOURS PRN
Status: DISCONTINUED | OUTPATIENT
Start: 2023-04-17 | End: 2023-04-19 | Stop reason: HOSPADM

## 2023-04-17 RX ORDER — ACETAMINOPHEN 325 MG/1
650 TABLET ORAL EVERY 6 HOURS PRN
Status: DISCONTINUED | OUTPATIENT
Start: 2023-04-17 | End: 2023-04-18 | Stop reason: SDUPTHER

## 2023-04-17 RX ADMIN — ONDANSETRON 4 MG: 2 INJECTION INTRAMUSCULAR; INTRAVENOUS at 22:21

## 2023-04-17 RX ADMIN — SODIUM CHLORIDE 500 ML: 9 INJECTION, SOLUTION INTRAVENOUS at 19:29

## 2023-04-17 RX ADMIN — MECLIZINE HYDROCHLORIDE 25 MG: 25 TABLET ORAL at 19:29

## 2023-04-17 NOTE — ED PROVIDER NOTES
Time: 3:31 PM EDT  Date of encounter:  4/17/2023  Independent Historian/Clinical History and Information was obtained by:   Patient  Chief Complaint   Patient presents with   • Blurred Vision   • Dizziness       History is limited by: N/A    History of Present Illness:  Patient is a 72 y.o. year old female who presents to the emergency department for evaluation of blurred vision, dizziness.  States started last evening.  Described as vertigo.  Slightly improving but still persistent.  Also endorses some chest tightness and pressure. (Mary Lou Mitchell PA-C provider in triage 3:32 PM EDT )     Pt has been dizzy since 10:00 PM last night. She notes dizziness upon standing. She has not been diagnosed with vertigo before. She also admits diaphoresis. She notes double vision. She says she can still walk.     She denies any recent illness. She denies fever, chills, and cough.           Patient Care Team  Primary Care Provider: Eduard Minaya MD    Past Medical History:     Allergies   Allergen Reactions   • Indomethacin Palpitations and Shortness Of Breath   • Meloxicam Itching and Shortness Of Breath     Other reaction(s): soa, itching   • Moxifloxacin Seizure     Other reaction(s): seizures   • Morphine Hives and Itching     Other reaction(s): hives     Past Medical History:   Diagnosis Date   • Arthritis    • Asthma    • COPD (chronic obstructive pulmonary disease) 08/15/2022   • Diabetes mellitus    • Essential hypertension 08/24/2022   • Injury of back    • Mood disorder    • S/P exploratory laparotomy with lysis of adhesions 08/22/2022   • Sleep apnea      Past Surgical History:   Procedure Laterality Date   • BACK SURGERY     • COLONOSCOPY     • EXPLORATORY LAPAROTOMY N/A 03/19/2012    repair of colon perforation after colonoscopy   • EXPLORATORY LAPAROTOMY N/A 8/19/2022    Procedure: LAPAROTOMY EXPLORATORY;  Surgeon: Snehal Medina MD;  Location: [facility] OR [facility] [place];  Service: General;  Laterality: N/A;   •  EYE SURGERY     • HYSTERECTOMY       History reviewed. No pertinent family history.    Home Medications:  Prior to Admission medications    Medication Sig Start Date End Date Taking? Authorizing Provider   albuterol sulfate  (90 Base) MCG/ACT inhaler Inhale 2 puffs Every 4 (Four) Hours As Needed.    Senia aGrcia MD   busPIRone (BUSPAR) 10 MG tablet Take 10 mg by mouth 2 (Two) Times a Day. 22   Senia Garcia MD   Cholecalciferol (Vitamin D3) 50 MCG (2000 UT) capsule Take 2,000 Units by mouth Daily. 22   Senia Garcia MD   diclofenac (VOLTAREN) 75 MG EC tablet TAKE 1 TABLET BY MOUTH EVERY DAY WITH FOOD OR MILK AS NEEDED 22   Senia Garcia MD   docusate sodium 100 MG capsule Take 100 mg by mouth 2 (Two) Times a Day As Needed.    Senia Garcia MD   fluticasone (FLONASE) 50 MCG/ACT nasal spray 1 spray by Each Nare route Daily As Needed.    Senia Garcia MD   HYDROcodone-acetaminophen (NORCO) 5-325 MG per tablet Take 1 tablet by mouth Every 12 (Twelve) Hours As Needed. 10/14/21   Senia Garcia MD   omeprazole (prilOSEC) 10 MG capsule Take 10 mg by mouth Daily As Needed. 10/8/21   Senia Garcia MD        Social History:   Social History     Tobacco Use   • Smoking status: Former     Packs/day: 0.25     Years: 10.00     Pack years: 2.50     Types: Cigarettes     Start date:      Quit date: 2022     Years since quittin.6     Passive exposure: Never   • Smokeless tobacco: Never   Vaping Use   • Vaping Use: Never used   Substance Use Topics   • Alcohol use: Never   • Drug use: Never         Review of Systems:  Review of Systems   Constitutional: Positive for diaphoresis. Negative for activity change, chills, fatigue and unexpected weight change.   HENT: Negative for congestion, sinus pressure, sore throat and trouble swallowing.    Eyes: Positive for visual disturbance. Negative for pain, discharge and redness.   Respiratory:  "Positive for chest tightness. Negative for cough, shortness of breath and wheezing.    Cardiovascular: Negative for chest pain and palpitations.   Gastrointestinal: Negative for abdominal pain, diarrhea, nausea and vomiting.   Endocrine: Negative for cold intolerance and polydipsia.   Genitourinary: Negative for dysuria, frequency, hematuria and urgency.   Musculoskeletal: Negative for arthralgias, joint swelling, neck pain and neck stiffness.   Skin: Negative for color change and rash.   Allergic/Immunologic: Negative for environmental allergies and immunocompromised state.   Neurological: Positive for dizziness. Negative for weakness and light-headedness.   Hematological: Does not bruise/bleed easily.   Psychiatric/Behavioral: Negative for agitation, confusion, dysphoric mood and suicidal ideas.        Physical Exam:  /78 (BP Location: Right arm, Patient Position: Lying)   Pulse 91   Temp 98.3 °F (36.8 °C) (Oral)   Resp 20   Ht 157.5 cm (62\")   Wt 45.7 kg (100 lb 12 oz)   SpO2 100%   BMI 18.43 kg/m²     Physical Exam  Vitals and nursing note reviewed.   Constitutional:       General: She is not in acute distress.     Appearance: Normal appearance. She is not toxic-appearing.   HENT:      Head: Normocephalic and atraumatic.      Jaw: There is normal jaw occlusion.      Mouth/Throat:      Mouth: Mucous membranes are moist.   Eyes:      General: Lids are normal.      Extraocular Movements: Extraocular movements intact.      Conjunctiva/sclera: Conjunctivae normal.      Pupils: Pupils are equal, round, and reactive to light.   Cardiovascular:      Rate and Rhythm: Normal rate and regular rhythm.      Pulses: Normal pulses.      Heart sounds: Normal heart sounds.   Pulmonary:      Effort: Pulmonary effort is normal. No respiratory distress.      Breath sounds: Normal breath sounds. No wheezing or rhonchi.   Abdominal:      General: Abdomen is flat. There is no distension.      Palpations: Abdomen is soft. "      Tenderness: There is no abdominal tenderness. There is no guarding or rebound.   Musculoskeletal:         General: Normal range of motion.      Cervical back: Normal range of motion and neck supple.      Right lower leg: No edema.      Left lower leg: No edema.   Skin:     General: Skin is warm and dry.   Neurological:      General: No focal deficit present.      Mental Status: She is alert and oriented to person, place, and time. Mental status is at baseline.      Cranial Nerves: Cranial nerves 2-12 are intact.      Sensory: Sensation is intact.      Motor: Motor function is intact.      Coordination: Coordination is intact.      Comments: Cerebellar function normal.    Psychiatric:         Mood and Affect: Mood normal.         Behavior: Behavior normal.                  Procedures:  Procedures      Medical Decision Making:      Comorbidities that affect care:    Asthma, COPD, Hypertension    External Notes reviewed:    Previous Clinic Note: seen in clinic by Dr Minaya and sent to ED      The following orders were placed and all results were independently analyzed by me:  Orders Placed This Encounter   Procedures   • XR Chest 1 View   • CT Head Without Contrast   • MRI Brain Without Contrast   • [place] Draw   • Comprehensive Metabolic Panel   • Single High Sensitivity Troponin T   • Magnesium   • Urinalysis With Culture If Indicated -   • CBC Auto Differential   • Diet: Regular/House Diet; Texture: Regular Texture (IDDSI 7); Fluid Consistency: Thin (IDDSI 0)   • Undress & Gown   • Continuous Pulse Oximetry   • Vital Signs   • Neuro Checks   • Vital Signs   • Up in Chair   • Activity (Specify Details)   • Turn Patient   • Up With Assistance   • Weigh Patient   • Daily Weights   • Strict Intake & Output   • Oral Care   • Saline Lock & Maintain IV Access   • Apply Heat To Affected Area   • Orthostatic Blood Pressure   • Orthostatic Blood Pressure   • Code Status and Medical Interventions:   • Oxygen Therapy-  Nasal Cannula; 2 LPM; Titrate for SPO2: 92%, Greater Than or Equal To   • Pulse Oximetry,  Spot   • ECG 12 Lead ED Triage Standing Order; Weak / Dizzy / AMS   • Insert Peripheral IV   • Insert Peripheral IV   • Initiate Observation Status   • Transfer Patient   • Fall Precautions   • CBC & Differential   • Green Top (Gel)   • Lavender Top   • Gold Top - SST   • Light Blue Top       Medications Given in the Emergency Department:  Medications   sodium chloride 0.9 % flush 10 mL (has no administration in time range)   ondansetron (ZOFRAN) injection 4 mg (4 mg Intravenous Given 4/18/23 1223)   HYDROcodone-acetaminophen (NORCO) 5-325 MG per tablet 1 tablet (1 tablet Oral Given 4/18/23 1223)   sodium chloride 0.9 % flush 10 mL (has no administration in time range)   sodium chloride 0.9 % flush 10 mL (10 mL Intravenous Given 4/18/23 1034)   sodium chloride 0.9 % infusion 40 mL (has no administration in time range)   acetaminophen (TYLENOL) tablet 650 mg (has no administration in time range)     Or   acetaminophen (TYLENOL) 160 MG/5ML solution 650 mg (has no administration in time range)     Or   acetaminophen (TYLENOL) suppository 650 mg (has no administration in time range)   HYDROcodone-acetaminophen (NORCO)  MG per tablet 1 tablet (has no administration in time range)   HYDROmorphone (DILAUDID) injection 0.5 mg (has no administration in time range)     And   naloxone (NARCAN) injection 0.4 mg (has no administration in time range)   aluminum-magnesium hydroxide-simethicone (MAALOX MAX) 400-400-40 MG/5ML suspension 15 mL (has no administration in time range)   famotidine (PEPCID) tablet 40 mg (40 mg Oral Given 4/18/23 1033)   ALPRAZolam (XANAX) tablet 0.5 mg (has no administration in time range)   sennosides-docusate (PERICOLACE) 8.6-50 MG per tablet 2 tablet (2 tablets Oral Given 4/18/23 1033)     And   polyethylene glycol (MIRALAX) packet 17 g (has no administration in time range)     And   bisacodyl (DULCOLAX) EC  tablet 5 mg (has no administration in time range)     And   bisacodyl (DULCOLAX) suppository 10 mg (has no administration in time range)   ondansetron (ZOFRAN) injection 4 mg (has no administration in time range)   melatonin tablet 5 mg (has no administration in time range)   Enoxaparin Sodium (LOVENOX) syringe 40 mg (40 mg Subcutaneous Given 4/18/23 1034)   lactated ringers infusion (100 mL/hr Intravenous New Bag 4/18/23 1830)   metoclopramide (REGLAN) injection 10 mg (10 mg Intravenous Given 4/18/23 1623)   sodium chloride 0.9 % bolus 500 mL (0 mL Intravenous Stopped 4/17/23 2119)   meclizine (ANTIVERT) tablet 25 mg (25 mg Oral Given 4/17/23 1929)        ED Course:    The patient was initially evaluated in the triage area where orders were placed. The patient was later dispositioned by Mark Davis DO.      The patient was advised to stay for completion of workup which includes but is not limited to communication of labs and radiological results, reassessment and plan. The patient was advised that leaving prior to disposition by a provider could result in critical findings that are not communicated to the patient.          Labs:    Lab Results (last 24 hours)     ** No results found for the last 24 hours. **           Imaging:    MRI Brain Without Contrast    Result Date: 4/18/2023  PROCEDURE: MRI BRAIN WO CONTRAST  COMPARISON: None  INDICATIONS: Dizziness, non-specific. Hypertensive. Neck pain radicular to occipital region x two months.      TECHNIQUE: A variety of imaging planes and parameters were utilized for visualization of suspected pathology.  Images were performed without contrast.  FINDINGS:  Diffusion-weighted images demonstrate no acute infarcts.  There is no evidence of intracranial hemorrhage.  There is no mass, mass effect, hydrocephalus.  No abnormal extra-axial fluid collections are identified.  Sella and suprasellar cistern are within normal limits.  Craniovertebral junction appears normal.   Major intracranial vascular flow voids are preserved.  Globes and orbits are within normal limits.  Visualized paranasal sinuses and mastoid air cells are clear.        1. No acute intracranial abnormality.      TALIA DAVIDSON MD       Electronically Signed and Approved By: TALIA DAVIDSON MD on 4/18/2023 at 7:28               EKG: Sinus rhythm with a rate of 78 BPM  Left atrial enlargement with normal UT interval  Nonspecific ST changes         Differential Diagnosis and Discussion:      Dizziness: Based on the patient's history, signs, and symptoms, the diffential diagnosis includes but is not limited to meningitis, stroke, sepsis, subarachnoid hemorrhage, intracranial bleeding, encephalitis, vertigo, electrolyte imbalance, and metabolic disorders.    All labs were reviewed and interpreted by me.  EKG was interpreted by me.    [facility]         Patient Care Considerations:    MRI: I considered ordering an MRI however this will be performed as inpatient      Consultants/Shared Management Plan:    Hospitalist: I have discussed the case with Dr Garcia who agrees to accept the patient for admission.    Social Determinants of Health:    Patient is independent, reliable, and has access to care.       Disposition and Care Coordination:    Admit:   Through independent evaluation of the patient's history, physical, and imperical data, the patient meets criteria for observation/admission to the hospital.        Final diagnoses:   Vertigo        ED Disposition     ED Disposition   Decision to Admit    Condition   --    Comment   Level of Care: Telemetry [5]   Diagnosis: Vertigo [[id number]]   Admitting Physician: BERTO NELSON [[id number]]   Attending Physician: BERTO NELSON [[id number]]               This medical record created using voice recognition software.    Documentation assistance provided by Shen Marinelli acting as scribe for Mark Davis DO. Information recorded by the scribe was done at my direction and has been verified and  validated by me.          Shen Marinelli  04/17/23 1913       Mark Davis DO  04/18/23 2121

## 2023-04-18 ENCOUNTER — APPOINTMENT (OUTPATIENT)
Dept: MRI IMAGING | Facility: HOSPITAL | Age: 73
End: 2023-04-18
Payer: MEDICARE

## 2023-04-18 PROBLEM — R53.1 WEAKNESS: Status: ACTIVE | Noted: 2023-04-18

## 2023-04-18 PROBLEM — E86.0 DEHYDRATION: Status: ACTIVE | Noted: 2023-04-18

## 2023-04-18 PROCEDURE — G0378 HOSPITAL OBSERVATION PER HR: HCPCS

## 2023-04-18 PROCEDURE — 96376 TX/PRO/DX INJ SAME DRUG ADON: CPT

## 2023-04-18 PROCEDURE — 70551 MRI BRAIN STEM W/O DYE: CPT

## 2023-04-18 PROCEDURE — 96372 THER/PROPH/DIAG INJ SC/IM: CPT

## 2023-04-18 PROCEDURE — 25010000002 METOCLOPRAMIDE PER 10 MG: Performed by: INTERNAL MEDICINE

## 2023-04-18 PROCEDURE — 96361 HYDRATE IV INFUSION ADD-ON: CPT

## 2023-04-18 PROCEDURE — 25010000002 ENOXAPARIN PER 10 MG: Performed by: INTERNAL MEDICINE

## 2023-04-18 PROCEDURE — 96375 TX/PRO/DX INJ NEW DRUG ADDON: CPT

## 2023-04-18 PROCEDURE — 25010000002 ONDANSETRON PER 1 MG: Performed by: INTERNAL MEDICINE

## 2023-04-18 RX ORDER — ALUMINA, MAGNESIA, AND SIMETHICONE 2400; 2400; 240 MG/30ML; MG/30ML; MG/30ML
15 SUSPENSION ORAL EVERY 6 HOURS PRN
Status: DISCONTINUED | OUTPATIENT
Start: 2023-04-18 | End: 2023-04-19 | Stop reason: HOSPADM

## 2023-04-18 RX ORDER — POLYETHYLENE GLYCOL 3350 17 G/17G
17 POWDER, FOR SOLUTION ORAL DAILY PRN
Status: DISCONTINUED | OUTPATIENT
Start: 2023-04-18 | End: 2023-04-19 | Stop reason: HOSPADM

## 2023-04-18 RX ORDER — ACETAMINOPHEN 160 MG/5ML
650 SOLUTION ORAL EVERY 4 HOURS PRN
Status: DISCONTINUED | OUTPATIENT
Start: 2023-04-18 | End: 2023-04-19 | Stop reason: HOSPADM

## 2023-04-18 RX ORDER — BISACODYL 5 MG/1
5 TABLET, DELAYED RELEASE ORAL DAILY PRN
Status: DISCONTINUED | OUTPATIENT
Start: 2023-04-18 | End: 2023-04-19 | Stop reason: HOSPADM

## 2023-04-18 RX ORDER — HYDROCODONE BITARTRATE AND ACETAMINOPHEN 5; 325 MG/1; MG/1
1 TABLET ORAL EVERY 6 HOURS PRN
Status: DISCONTINUED | OUTPATIENT
Start: 2023-04-18 | End: 2023-04-19 | Stop reason: HOSPADM

## 2023-04-18 RX ORDER — FAMOTIDINE 20 MG/1
40 TABLET, FILM COATED ORAL DAILY
Status: DISCONTINUED | OUTPATIENT
Start: 2023-04-18 | End: 2023-04-19 | Stop reason: HOSPADM

## 2023-04-18 RX ORDER — ENOXAPARIN SODIUM 100 MG/ML
40 INJECTION SUBCUTANEOUS EVERY 24 HOURS
Status: DISCONTINUED | OUTPATIENT
Start: 2023-04-18 | End: 2023-04-19 | Stop reason: HOSPADM

## 2023-04-18 RX ORDER — SODIUM CHLORIDE 9 MG/ML
40 INJECTION, SOLUTION INTRAVENOUS AS NEEDED
Status: DISCONTINUED | OUTPATIENT
Start: 2023-04-18 | End: 2023-04-19 | Stop reason: HOSPADM

## 2023-04-18 RX ORDER — SODIUM CHLORIDE 0.9 % (FLUSH) 0.9 %
10 SYRINGE (ML) INJECTION EVERY 12 HOURS SCHEDULED
Status: DISCONTINUED | OUTPATIENT
Start: 2023-04-18 | End: 2023-04-19 | Stop reason: HOSPADM

## 2023-04-18 RX ORDER — SODIUM CHLORIDE, SODIUM LACTATE, POTASSIUM CHLORIDE, CALCIUM CHLORIDE 600; 310; 30; 20 MG/100ML; MG/100ML; MG/100ML; MG/100ML
100 INJECTION, SOLUTION INTRAVENOUS CONTINUOUS
Status: DISCONTINUED | OUTPATIENT
Start: 2023-04-18 | End: 2023-04-19 | Stop reason: HOSPADM

## 2023-04-18 RX ORDER — ACETAMINOPHEN 650 MG/1
650 SUPPOSITORY RECTAL EVERY 4 HOURS PRN
Status: DISCONTINUED | OUTPATIENT
Start: 2023-04-18 | End: 2023-04-19 | Stop reason: HOSPADM

## 2023-04-18 RX ORDER — SODIUM CHLORIDE 0.9 % (FLUSH) 0.9 %
10 SYRINGE (ML) INJECTION AS NEEDED
Status: DISCONTINUED | OUTPATIENT
Start: 2023-04-18 | End: 2023-04-19 | Stop reason: HOSPADM

## 2023-04-18 RX ORDER — ALPRAZOLAM 0.25 MG/1
0.5 TABLET ORAL EVERY 8 HOURS PRN
Status: DISCONTINUED | OUTPATIENT
Start: 2023-04-18 | End: 2023-04-19 | Stop reason: HOSPADM

## 2023-04-18 RX ORDER — AMOXICILLIN 250 MG
2 CAPSULE ORAL 2 TIMES DAILY
Status: DISCONTINUED | OUTPATIENT
Start: 2023-04-18 | End: 2023-04-19 | Stop reason: HOSPADM

## 2023-04-18 RX ORDER — NALOXONE HCL 0.4 MG/ML
0.4 VIAL (ML) INJECTION
Status: DISCONTINUED | OUTPATIENT
Start: 2023-04-18 | End: 2023-04-19 | Stop reason: HOSPADM

## 2023-04-18 RX ORDER — ACETAMINOPHEN 325 MG/1
650 TABLET ORAL EVERY 4 HOURS PRN
Status: DISCONTINUED | OUTPATIENT
Start: 2023-04-18 | End: 2023-04-19 | Stop reason: HOSPADM

## 2023-04-18 RX ORDER — ONDANSETRON 2 MG/ML
4 INJECTION INTRAMUSCULAR; INTRAVENOUS EVERY 6 HOURS PRN
Status: DISCONTINUED | OUTPATIENT
Start: 2023-04-18 | End: 2023-04-19 | Stop reason: HOSPADM

## 2023-04-18 RX ORDER — HYDROCODONE BITARTRATE AND ACETAMINOPHEN 10; 325 MG/1; MG/1
1 TABLET ORAL EVERY 4 HOURS PRN
Status: DISCONTINUED | OUTPATIENT
Start: 2023-04-18 | End: 2023-04-19 | Stop reason: HOSPADM

## 2023-04-18 RX ORDER — CHOLECALCIFEROL (VITAMIN D3) 125 MCG
5 CAPSULE ORAL NIGHTLY PRN
Status: DISCONTINUED | OUTPATIENT
Start: 2023-04-18 | End: 2023-04-19 | Stop reason: HOSPADM

## 2023-04-18 RX ORDER — BISACODYL 10 MG
10 SUPPOSITORY, RECTAL RECTAL DAILY PRN
Status: DISCONTINUED | OUTPATIENT
Start: 2023-04-18 | End: 2023-04-19 | Stop reason: HOSPADM

## 2023-04-18 RX ORDER — HYDROCODONE BITARTRATE AND ACETAMINOPHEN 5; 325 MG/1; MG/1
1 TABLET ORAL EVERY 4 HOURS PRN
Status: DISCONTINUED | OUTPATIENT
Start: 2023-04-18 | End: 2023-04-18 | Stop reason: SDUPTHER

## 2023-04-18 RX ORDER — METOCLOPRAMIDE HYDROCHLORIDE 5 MG/ML
10 INJECTION INTRAMUSCULAR; INTRAVENOUS 3 TIMES DAILY
Status: DISCONTINUED | OUTPATIENT
Start: 2023-04-18 | End: 2023-04-19 | Stop reason: HOSPADM

## 2023-04-18 RX ADMIN — FAMOTIDINE 40 MG: 20 TABLET, FILM COATED ORAL at 10:33

## 2023-04-18 RX ADMIN — SODIUM CHLORIDE, POTASSIUM CHLORIDE, SODIUM LACTATE AND CALCIUM CHLORIDE 100 ML/HR: 600; 310; 30; 20 INJECTION, SOLUTION INTRAVENOUS at 18:30

## 2023-04-18 RX ADMIN — ONDANSETRON 4 MG: 2 INJECTION INTRAMUSCULAR; INTRAVENOUS at 12:23

## 2023-04-18 RX ADMIN — METOCLOPRAMIDE 10 MG: 5 INJECTION, SOLUTION INTRAMUSCULAR; INTRAVENOUS at 16:23

## 2023-04-18 RX ADMIN — HYDROCODONE BITARTRATE AND ACETAMINOPHEN 1 TABLET: 5; 325 TABLET ORAL at 02:47

## 2023-04-18 RX ADMIN — ONDANSETRON 4 MG: 2 INJECTION INTRAMUSCULAR; INTRAVENOUS at 23:34

## 2023-04-18 RX ADMIN — SENNOSIDES AND DOCUSATE SODIUM 2 TABLET: 8.6; 5 TABLET ORAL at 10:33

## 2023-04-18 RX ADMIN — SODIUM CHLORIDE, POTASSIUM CHLORIDE, SODIUM LACTATE AND CALCIUM CHLORIDE 100 ML/HR: 600; 310; 30; 20 INJECTION, SOLUTION INTRAVENOUS at 10:07

## 2023-04-18 RX ADMIN — Medication 10 ML: at 10:34

## 2023-04-18 RX ADMIN — ENOXAPARIN SODIUM 40 MG: 100 INJECTION SUBCUTANEOUS at 10:34

## 2023-04-18 RX ADMIN — METOCLOPRAMIDE 10 MG: 5 INJECTION, SOLUTION INTRAMUSCULAR; INTRAVENOUS at 10:34

## 2023-04-18 RX ADMIN — HYDROCODONE BITARTRATE AND ACETAMINOPHEN 1 TABLET: 5; 325 TABLET ORAL at 12:23

## 2023-04-18 RX ADMIN — ONDANSETRON 4 MG: 2 INJECTION INTRAMUSCULAR; INTRAVENOUS at 05:24

## 2023-04-18 NOTE — SIGNIFICANT NOTE
04/17/23 2024   Living Environment   People in Home alone   Current Living Arrangements home   Primary Care Provided by self   Provides Primary Care For no one   Caregiving Concerns none   Family Caregiver if Needed none   Quality of Family Relationships supportive   Able to Return to Prior Arrangements yes   Living Arrangement Comments lives by herself with her son who lives next door.   Resource/Environmental Concerns   Transportation Concerns none   Transition Planning   Patient/Family Anticipates Transition to home   Patient/Family Anticipated Services at Transition none   Transportation Anticipated car, drives self   Discharge Needs Assessment   Readmission Within the Last 30 Days no previous admission in last 30 days   Equipment Currently Used at Home walker, standard  (pt states that she uses a walker occassionally)   Concerns to be Addressed no discharge needs identified   Anticipated Changes Related to Illness none   Equipment Needed After Discharge none     SW student appeared bedside to a female 72 yr pt who had her son, Ramesh Hope and her boyfriend Mariusz Felipe by her side as support. Pt stated that she is coming from her home prior to admission and has every plan on returning upon discharge. Pt stated that she lives alone, is independent and that her son lives right next door to her. Pt stated that besides her boyfriend and her son by her side there is no other support system. Pt stated that she does not have a living will or a POA and does not feel that she needs one. Pt stated that she does not need nor use home healthcare, nor is she on dialysis. Pt stated that her PCP is Dr. Minaya, the one across the street. Pt stated that her pharmacy is [facility] and she occasionally uses a walker at home. Pt stated that she is not going to pay for a $600.00 drug for her bladder, yet other than that she has no concern with being able to afford her medication. Pt stated that she has no  issue driving herself around, nor does she anticipate the need for services and or assistance after discharge. Pt stated that she has no prior admittance to a hospital or rehab center in the last 30 days.

## 2023-04-18 NOTE — H&P
[facility]   HISTORY AND PHYSICAL    Patient Name: Fanat Hawkins  : 1950  MRN: [id number]  Primary Care Physician:  Valentin Minaya MD  Date of admission: 2023    Subjective   Subjective     Chief Complaint:   Dizziness      HPI:    Fanta Hawkins is a 72 y.o. female admitted to hospital for dizziness possible vertigo.  Patient has ongoing chronic issues of dizziness and chronic headache.  She was seen by her PCP yesterday in the office and subsequently recommended to go to ER.  ER work-up did not reveal anything acute including MRI which was ordered by us.  When I came to see patient this morning she is awake alert she denies any headache complains of some nausea.  Son is at bedside        Review of Systems:    Nonspecific complaints of fatigue, chronic headache, dizziness on standing.  No chest pain no shortness of breath  No urinary symptoms reported    Personal History     Past Medical History:   Diagnosis Date   • Arthritis    • Asthma    • COPD (chronic obstructive pulmonary disease) 08/15/2022   • Diabetes mellitus    • Essential hypertension 2022   • Injury of back    • Mood disorder    • S/P exploratory laparotomy with lysis of adhesions 2022   • Sleep apnea        Past Surgical History:   Procedure Laterality Date   • BACK SURGERY     • COLONOSCOPY     • EXPLORATORY LAPAROTOMY N/A 2012    repair of colon perforation after colonoscopy   • EXPLORATORY LAPAROTOMY N/A 2022    Procedure: LAPAROTOMY EXPLORATORY;  Surgeon: Snehal Medina MD;  Location: [facility] [place];  Service: General;  Laterality: N/A;   • EYE SURGERY     • HYSTERECTOMY         Family History: family history is not on file. Otherwise pertinent FHx was reviewed and not pertinent to current issue.    Social History:  reports that she quit smoking about 7 months ago. Her smoking use included cigarettes. She started smoking about 10 years ago. She has a 2.50 pack-year smoking history. She has never  been exposed to tobacco smoke. She has never used smokeless tobacco. She reports that she does not drink alcohol and does not use drugs.    Home Medications:  HYDROcodone-acetaminophen, Vitamin D3, albuterol sulfate HFA, busPIRone, docusate sodium, fluticasone, meclizine, and tiZANidine      Allergies:  Allergies   Allergen Reactions   • Indomethacin Palpitations and Shortness Of Breath   • Meloxicam Itching and Shortness Of Breath     Other reaction(s): soa, itching   • Moxifloxacin Seizure     Other reaction(s): seizures   • Morphine Hives and Itching     Other reaction(s): hives       Objective   Objective     Vitals:   Temp:  [97.2 °F (36.2 °C)-98 °F (36.7 °C)] 97.2 °F (36.2 °C)  Heart Rate:  [73-83] 73  Resp:  [16-18] 16  BP: (124-182)/(74-94) 134/79    Physical Exam    Elderly cachectic female not in acute distress awake alert and oriented.  Pupils are reactive extraocular muscle intact.  Heart is regular.  Extremities without any edema.  Neurologically awake alert and oriented.      I have personally reviewed the results from the time of this admission to 4/18/2023 08:23 EDT and agree with these findings:  [x]  Laboratory  []  Microbiology  [x]  Radiology  []  EKG/Telemetry   []  Cardiology/Vascular   []  Pathology  []  Old records  []  Other:    CBC:    WBC   Date Value Ref Range Status   04/17/2023 7.26 3.40 - 10.80 10*3/mm3 Final     RBC   Date Value Ref Range Status   04/17/2023 3.76 (L) 3.77 - 5.28 10*6/mm3 Final     Hemoglobin   Date Value Ref Range Status   04/17/2023 12.7 12.0 - 15.9 g/dL Final     Hematocrit   Date Value Ref Range Status   04/17/2023 37.6 34.0 - 46.6 % Final     MCV   Date Value Ref Range Status   04/17/2023 100.0 (H) 79.0 - 97.0 fL Final     MCH   Date Value Ref Range Status   04/17/2023 33.8 (H) 26.6 - 33.0 pg Final     MCHC   Date Value Ref Range Status   04/17/2023 33.8 31.5 - 35.7 g/dL Final     RDW   Date Value Ref Range Status   04/17/2023 12.0 (L) 12.3 - 15.4 % Final      RDW-SD   Date Value Ref Range Status   04/17/2023 44.4 37.0 - 54.0 fl Final     MPV   Date Value Ref Range Status   04/17/2023 8.9 6.0 - 12.0 fL Final     Platelets   Date Value Ref Range Status   04/17/2023 286 140 - 450 10*3/mm3 Final     Neutrophil %   Date Value Ref Range Status   04/17/2023 74.4 42.7 - 76.0 % Final     Lymphocyte %   Date Value Ref Range Status   04/17/2023 16.9 (L) 19.6 - 45.3 % Final     Monocyte %   Date Value Ref Range Status   04/17/2023 7.2 5.0 - 12.0 % Final     Eosinophil %   Date Value Ref Range Status   04/17/2023 0.8 0.3 - 6.2 % Final     Basophil %   Date Value Ref Range Status   04/17/2023 0.4 0.0 - 1.5 % Final     Immature Grans %   Date Value Ref Range Status   04/17/2023 0.3 0.0 - 0.5 % Final     Neutrophils, Absolute   Date Value Ref Range Status   04/17/2023 5.40 1.70 - 7.00 10*3/mm3 Final     Lymphocytes, Absolute   Date Value Ref Range Status   04/17/2023 1.23 0.70 - 3.10 10*3/mm3 Final     Monocytes, Absolute   Date Value Ref Range Status   04/17/2023 0.52 0.10 - 0.90 10*3/mm3 Final     Eosinophils, Absolute   Date Value Ref Range Status   04/17/2023 0.06 0.00 - 0.40 10*3/mm3 Final     Basophils, Absolute   Date Value Ref Range Status   04/17/2023 0.03 0.00 - 0.20 10*3/mm3 Final     Immature Grans, Absolute   Date Value Ref Range Status   04/17/2023 0.02 0.00 - 0.05 10*3/mm3 Final     nRBC   Date Value Ref Range Status   04/17/2023 0.0 0.0 - 0.2 /100 WBC Final        BMP:    Lab Results   Component Value Date    GLUCOSE 99 04/17/2023    BUN 19 04/17/2023    CREATININE 0.66 04/17/2023    BCR 28.8 (H) 04/17/2023    K 4.4 04/17/2023    CO2 27.9 04/17/2023    CALCIUM 9.7 04/17/2023    ALBUMIN 4.2 04/17/2023    LABIL2 1.1 (L) 05/09/2019    AST 15 04/17/2023    ALT 9 04/17/2023        CT Head Without Contrast    Result Date: 4/17/2023   Normal noncontrast CT of the brain     Branden Montelongo MD       Electronically Signed and Approved By: Branden Montelongo MD on 4/17/2023 at 16:35              MRI Brain Without Contrast    Result Date: 4/18/2023    1. No acute intracranial abnormality.      TALIA DAVIDSON MD       Electronically Signed and Approved By: TALIA DAVIDSON MD on 4/18/2023 at 7:28             XR Chest 1 View    Result Date: 4/17/2023   No active disease       Branden Montelongo MD       Electronically Signed and Approved By: Branden Montelongo MD on 4/17/2023 at 16:22                      Assessment & Plan   Assessment / Plan       Current Diagnosis:  Active Hospital Problems    Diagnosis    • **Vertigo      Plan:   Admitted for nonspecific symptoms of dizziness or vertigo.  Admitted last night.  Patient does not follow with's and she sees Dr. ELIJAH Minaya as her PCP.  She was admitted to my service by mistake.  Admission orders and admission work-up initiated.  Patient stable Dr. Vieira who covers for Dr. TRISTEN Minaya was notified and patient transferred to his service.  Further care will be provided by him.            CODE STATUS:       Admission Status:  I believe this patient meets observation status.             I have dictated this note utilizing Dragon Dictation.             Please note that portions of this note were completed with a voice recognition program.             Part of this note may be an electronic transcription/translation of spoken language to printed text         using the Dragon Dictation System.       Electronically signed by Valentin Minaya MD, 04/18/23, 8:23 AM EDT.    Total time spent with in evaluation and management:

## 2023-04-18 NOTE — PLAN OF CARE
Goal Outcome Evaluation:  Plan of Care Reviewed With: patient        Progress: no change  Outcome Evaluation: Transfer from PCU this shift. medicated with  prn pain and nausea medication along with scheduled reglan. iv fluids maintained. turned and repositioned self in bed. no other changes at this time.

## 2023-04-18 NOTE — CASE MANAGEMENT/SOCIAL WORK
Discharge Planning Assessment   [place]     Patient Name: Fanta Hawkins  MRN: [id number]  Today's Date: 4/18/2023    Admit Date: 4/17/2023    Plan: Pt lives alone, Pt plans to return home at discharge. Pt has good support from her son Branden who lives next door. Pharm: Monse Salinas, PCP: WERO Minaya. Pt son checks on Pt multiple times a day. SW will continue to follow for discharge needs.   Discharge Needs Assessment     Row Name 04/18/23 0947       Living Environment    People in Home alone    Unique Family Situation Pt's son lives next door and checks on Pt multiple times a day every day.    Current Living Arrangements home    Duration at Residence 2 years.    Potentially Unsafe Housing Conditions none    Primary Care Provided by self    Provides Primary Care For no one    Family Caregiver if Needed child(jonah), adult    Family Caregiver Names Pt's son Branden lives next door. Pt does have two daughters, they are not able to help as much as her son because they work.    Quality of Family Relationships helpful;involved;supportive    Able to Return to Prior Arrangements yes       Resource/Environmental Concerns    Resource/Environmental Concerns none    Transportation Concerns none       Food Insecurity    Within the past 12 months, you worried that your food would run out before you got the money to buy more. Never true    Within the past 12 months, the food you bought just didn't last and you didn't have money to get more. Never true       Transition Planning    Patient/Family Anticipates Transition to home    Patient/Family Anticipated Services at Transition none    Transportation Anticipated family or friend will provide       Discharge Needs Assessment    Readmission Within the Last 30 Days no previous admission in last 30 days    Equipment Currently Used at Home walker, standard    Concerns to be Addressed discharge planning    Anticipated Changes Related to Illness none    Equipment Needed After  Discharge none    Discharge Coordination/Progress Pt lives alone, Pt plans to return home at discharge. Pt has good support from her son Branden who lives next door. Pharm: Monse Salinas, PCP: WERO Minaya. Pt son checks on Pt multiple times a day. SW will continue to follow for discharge needs.               Discharge Plan     Row Name 04/18/23 0952       Plan    Plan Pt lives alone, Pt plans to return home at discharge. Pt has good support from her son Branden who lives next door. Pharm: Monse Salinas, PCP: WERO Minaya. Pt son checks on Pt multiple times a day. SW will continue to follow for discharge needs.              Continued Care and Services - Admitted Since 4/17/2023    Coordination has not been started for this encounter.          Demographic Summary     Row Name 04/18/23 0943       General Information    Admission Type observation    Arrived From emergency department    Referral Source admission list    Reason for Consult discharge planning    Preferred Language English       Contact Information    Permission Granted to Share Info With lay caregiver    Contact Information Obtained for lay caregiver       Lay Caregiver Information    Name, Lay Caregiver Branden Jaramillo    Phone, Lay Caregiver [phone]               Functional Status     Row Name 04/18/23 0944       Functional Status    Usual Activity Tolerance good    Current Activity Tolerance good       Physical Activity    On average, how many days per week do you engage in moderate to strenuous exercise (like a brisk walk)? 0 days    On average, how many minutes do you engage in exercise at this level? 0 min    Number of minutes of exercise per week 0       Assessment of Health Literacy    How often do you have someone help you read hospital materials? Occasionally    How often do you have problems learning about your medical condition because of difficulty understanding written information? Occasionally    How often do you have a problem  understanding what is told to you about your medical condition? Occasionally    How confident are you filling out medical forms by yourself? Somewhat    Health Literacy Good       Functional Status, IADL    Medications independent    Meal Preparation independent    Housekeeping independent    Laundry independent    Shopping independent    IADL Comments Pt and son state Pt is independent at home.       Mental Status    General Appearance WDL WDL       Mental Status Summary    Recent Changes in Mental Status/Cognitive Functioning no changes       Employment/    Employment Status retired               Psychosocial    No documentation.                Abuse/Neglect    No documentation.                Legal     Row Name 04/18/23 0945       Financial Resource Strain    How hard is it for you to pay for the very basics like food, housing, medical care, and heating? Not hard       Financial/Legal    Source of Income social security;pension/half-way    Application for Public Assistance applied    Public Assistance Pending Number Pt currently has food stamps, Pt does get help from  for her utilites.       Legal    Criminal Activity/Legal Involvement none               Substance Abuse    No documentation.                Patient Forms    No documentation.                   Kelli Velasquez

## 2023-04-18 NOTE — CONSULTS
"Nutrition Services    Patient Name: Fanta Hawkins  YOB: 1950  MRN: [id number]  Admission date: 4/17/2023      CLINICAL NUTRITION ASSESSMENT      Reason for Assessment BMI 18.43     H&P:    Past Medical History:   Diagnosis Date   • Arthritis    • Asthma    • COPD (chronic obstructive pulmonary disease) 08/15/2022   • Diabetes mellitus    • Essential hypertension 08/24/2022   • Injury of back    • Mood disorder    • S/P exploratory laparotomy with lysis of adhesions 08/22/2022   • Sleep apnea         Current Problems:   Active Hospital Problems    Diagnosis    • **Vertigo    • Dehydration    • Weakness         Nutrition/Diet History         Narrative     Resident has BMI of 18.43, typical weights for pt # per pt and son..  Feeling nauseous today and not eating due to this.  Currently receiving a Regular diet with thin liquids.         Anthropometrics        Current Height, Weight Height: 157.5 cm (62\")  Weight: 45.7 kg (100 lb 12 oz)   Current BMI Body mass index is 18.43 kg/m².       Weight Hx  Wt Readings from Last 30 Encounters:   04/17/23 2132 45.7 kg (100 lb 12 oz)   04/17/23 1528 45.5 kg (100 lb 5 oz)   09/01/22 1104 41.5 kg (91 lb 9.6 oz)   08/26/22 0500 45.3 kg (99 lb 13.9 oz)   08/25/22 0500 44 kg (97 lb)   08/24/22 0545 45.5 kg (100 lb 5 oz)   08/22/22 0531 45.8 kg (100 lb 15.5 oz)   08/21/22 0514 48 kg (105 lb 13.1 oz)   08/20/22 0600 48.1 kg (106 lb 0.7 oz)   08/19/22 0645 47.8 kg (105 lb 6.1 oz)   08/18/22 0600 48.3 kg (106 lb 7.7 oz)   08/17/22 0000 47.5 kg (104 lb 12.8 oz)   08/16/22 0420 47.5 kg (104 lb 11.5 oz)   08/15/22 0010 46.3 kg (102 lb 1.2 oz)   08/14/22 0500 48.7 kg (107 lb 5.8 oz)   08/12/22 2114 45.3 kg (99 lb 13.9 oz)   08/12/22 0640 52.9 kg (116 lb 10 oz)   08/11/22 1137 52.6 kg (115 lb 15.4 oz)   08/11/22 0623 52.3 kg (115 lb 4.8 oz)   11/05/21 0839 44 kg (97 lb)   07/29/20 0000 45 kg (99 lb 4 oz)   12/20/19 0000 44.9 kg (99 lb)            Wt Change Observation " Normal weight range for pt is  pounds     Estimated/Assessed Needs       Energy Requirements    EST Needs (kcal/day) 1371 calories (30 malik/Kg)       Protein Requirements    EST Daily Needs (g/day) 36-46 grams (.8-1 g/kg)       Fluid Requirements     Estimated Needs (mL/day) 1371 cc (30 g/kg)     Labs/Medications         Pertinent Labs Reviewed.   Results from last 7 days   Lab Units 04/17/23  1534   SODIUM mmol/L 139   POTASSIUM mmol/L 4.4   CHLORIDE mmol/L 101   CO2 mmol/L 27.9   BUN mg/dL 19   CREATININE mg/dL 0.66   CALCIUM mg/dL 9.7   BILIRUBIN mg/dL 0.4   ALK PHOS U/L 94   ALT (SGPT) U/L 9   AST (SGOT) U/L 15   GLUCOSE mg/dL 99     Results from last 7 days   Lab Units 04/17/23  1534   MAGNESIUM mg/dL 2.0   HEMOGLOBIN g/dL 12.7   HEMATOCRIT % 37.6     No results found for: COVID19  No results found for: HGBA1C      Pertinent Medications Reviewed.     Current Nutrition Orders & Evaluation of Intake       Oral Nutrition     Current PO Diet Diet: Regular/House Diet; Texture: Regular Texture (IDDSI 7); Fluid Consistency: Thin (IDDSI 0)   Supplement No active supplement orders       Malnutrition Severity Assessment                Nutrition Diagnosis         Nutrition Dx Problem 1 Inadequate oral Intake related to GI dysfunction as evidenced by decreased appetite.       Nutrition Intervention         Monitor intake/nausea     Medical Nutrition Therapy/Nutrition Education          Learner     Readiness Patient  Non-acceptance     Method     Response N/A  Refuses teaching     Monitor/Evaluation        Monitor PO intake       Nutrition Discharge Plan         To be determined       Electronically signed by:  Matilda rBiggs RD  04/18/23 13:48 EDT

## 2023-04-18 NOTE — PLAN OF CARE
Goal Outcome Evaluation:   Pt admitted from the ED around 2300. MRI consent form filled out and signed. PRN Norco given for pain and PRN Zofran given for nausea. Complains of pain in neck and back of the head. Will continue to monitor.     Alan White RN

## 2023-04-18 NOTE — H&P
[facility]   HISTORY AND PHYSICAL    Patient Name: Fanta Hawkins  : 1950  MRN: [id number]  Primary Care Physician:  Valentin Minaya MD  Date of admission: 2023    Subjective   Subjective     Chief Complaint: Weakness and nausea    HPI:    Fanta Hawkins is a 72 y.o. female with past medical history significant for essential hypertension COPD anxiety has been complaining of headaches on and off nausea and also dizziness which looks like vertigo rather than hypotension induced dizziness.  Apparently she started having dizzy spell and then she felt nauseous went to her PCP who sent her to the emergency room from where patient is being admitted.  She already has a MRI of the brain which is unremarkable and her systolic blood pressure is in 130s.  When I see the patient she is awake alert she is still nauseous she denies any fever chills headache at this time however she does complain of headaches on and off.  She denies any chest pain shortness of breath.    Review of Systems  Constitutional:        Weakness tiredness fatigue  Eyes:                       No blurry vision, eye discharge, eye irritation, eye pain  HEENT:                   No acute hair loss, earache and discharge, nasal congestion or discharge, sore throat, postnasal drip  Respiratory:           No shortness of breath coughing sputum production wheezing hemoptysis pleuritic chest pain  Cardiovascular:     No chest pain, orthopnea, PND, dizziness, palpitation, lower extremity edema  Gastrointestinal:   No nausea vomiting diarrhea abdominal pain constipation  Genitourinary:       No urinary incontinence, hesitancy, frequency, urgency, dysuria  Neurological:        No confusion, headache, focal weakness, numbness, dysphasia  Hematologic:         No bruising, bleeding, pallor, lymphadenopathy  Endocrine:            No coldness, hot flashes, polyuria, abnormal hair growth  Musculoskeletal:    No body pains, aches, arthritic pains, muscle  pain ,muscle wasting  Psychiatric:          No low or high mood, anxiety, hallucinations, delusions  Skin.                      No rash, ulcers, bruising, itching    Personal History     Past Medical History:   Diagnosis Date   • Arthritis    • Asthma    • COPD (chronic obstructive pulmonary disease) 08/15/2022   • Diabetes mellitus    • Essential hypertension 08/24/2022   • Injury of back    • Mood disorder    • S/P exploratory laparotomy with lysis of adhesions 08/22/2022   • Sleep apnea        Past Surgical History:   Procedure Laterality Date   • BACK SURGERY     • COLONOSCOPY     • EXPLORATORY LAPAROTOMY N/A 03/19/2012    repair of colon perforation after colonoscopy   • EXPLORATORY LAPAROTOMY N/A 8/19/2022    Procedure: LAPAROTOMY EXPLORATORY;  Surgeon: Snehal Medina MD;  [facility] [place];  Service: General;  Laterality: N/A;   • EYE SURGERY     • HYSTERECTOMY         Family History: family history is not on file. Otherwise pertinent FHx was reviewed and not pertinent to current issue.    Social History:  reports that she quit smoking about 7 months ago. Her smoking use included cigarettes. She started smoking about 10 years ago. She has a 2.50 pack-year smoking history. She has never been exposed to tobacco smoke. She has never used smokeless tobacco. She reports that she does not drink alcohol and does not use drugs.    Home Medications:  HYDROcodone-acetaminophen, Vitamin D3, albuterol sulfate HFA, busPIRone, docusate sodium, fluticasone, meclizine, and tiZANidine      Allergies:  Allergies   Allergen Reactions   • Indomethacin Palpitations and Shortness Of Breath   • Meloxicam Itching and Shortness Of Breath     Other reaction(s): soa, itching   • Moxifloxacin Seizure     Other reaction(s): seizures   • Morphine Hives and Itching     Other reaction(s): hives       Objective   Objective     Vitals:   Temp:  [97.2 °F (36.2 °C)-98 °F (36.7 °C)] 97.2 °F (36.2 °C)  Heart Rate:  [73-83] 73  Resp:   [16-18] 16  BP: (124-182)/(74-94) 134/79  Physical Exam               Constitutional:         Awake, alert responsive, conversant, no obvious distress   Eyes:                       PERRLA, sclerae anicteric, no conjunctival injection   HEENT:                   Moist mucous membranes, no nasal or eye discharge, no throat congestion   Neck:                      Supple, no thyromegaly, no lymphadenopathy, trachea midline, no elevated JVD   Respiratory:           Clear to auscultation bilaterally, nonlabored respirations    Cardiovascular:     RRR, no murmurs, rubs, or gallops, palpable pedal pulses bilaterally,No bilateral ankle edema   Gastrointestinal:   Positive bowel sounds, soft, nontender, nondistended, no organomegaly   Musculoskeletal:   No clubbing or cyanosis to extremities, muscle wasting, joint swelling, muscle weakness   Psychiatric:             Appropriate affect, cooperative   Neurologic:            Awake alert ,oriented x 3, strength symmetric in all extremities, Cranial Nerves grossly intact to confrontation, speech clear   Skin:                      No rashes, bruising, skin ulcers, petechiae or ecchymosis    Result Review    Result Review:  I have personally reviewed the results from the time of this admission to 4/18/2023 09:18 EDT and agree with these findings:  []  Laboratory  []  Microbiology  []  Radiology  []  EKG/Telemetry   []  Cardiology/Vascular   []  Pathology  []  Old records  []  Other:    Assessment & Plan   Assessment / Plan     Active Hospital Problems:  Active Hospital Problems    Diagnosis    • **Vertigo    • Dehydration    • Weakness        Plan:   Gentle hydration  We will control her nausea with antiemetics  Check orthostatics  Ambulate patient    DVT prophylaxis:  No DVT prophylaxis order currently exists.    CODE STATUS:       Admission Status:  I believe this patient meets observation status.    Electronically signed by Vish Vieira MD, 04/18/23, 9:17 AM EDT.

## 2023-04-19 ENCOUNTER — READMISSION MANAGEMENT (OUTPATIENT)
Dept: CALL CENTER | Facility: HOSPITAL | Age: 73
End: 2023-04-19
Payer: MEDICARE

## 2023-04-19 VITALS
SYSTOLIC BLOOD PRESSURE: 139 MMHG | HEART RATE: 86 BPM | BODY MASS INDEX: 18.34 KG/M2 | DIASTOLIC BLOOD PRESSURE: 83 MMHG | WEIGHT: 99.65 LBS | OXYGEN SATURATION: 100 % | TEMPERATURE: 98.1 F | RESPIRATION RATE: 18 BRPM | HEIGHT: 62 IN

## 2023-04-19 PROBLEM — R42 VERTIGO: Status: RESOLVED | Noted: 2023-04-17 | Resolved: 2023-04-19

## 2023-04-19 PROBLEM — E86.0 DEHYDRATION: Status: RESOLVED | Noted: 2023-04-18 | Resolved: 2023-04-19

## 2023-04-19 PROBLEM — R53.1 WEAKNESS: Status: RESOLVED | Noted: 2023-04-18 | Resolved: 2023-04-19

## 2023-04-19 PROCEDURE — 96372 THER/PROPH/DIAG INJ SC/IM: CPT

## 2023-04-19 PROCEDURE — G0378 HOSPITAL OBSERVATION PER HR: HCPCS

## 2023-04-19 PROCEDURE — 96361 HYDRATE IV INFUSION ADD-ON: CPT

## 2023-04-19 PROCEDURE — 25010000002 ENOXAPARIN PER 10 MG: Performed by: INTERNAL MEDICINE

## 2023-04-19 RX ADMIN — FAMOTIDINE 40 MG: 20 TABLET, FILM COATED ORAL at 07:57

## 2023-04-19 RX ADMIN — SENNOSIDES AND DOCUSATE SODIUM 2 TABLET: 8.6; 5 TABLET ORAL at 07:56

## 2023-04-19 RX ADMIN — ENOXAPARIN SODIUM 40 MG: 100 INJECTION SUBCUTANEOUS at 07:57

## 2023-04-19 RX ADMIN — SODIUM CHLORIDE, POTASSIUM CHLORIDE, SODIUM LACTATE AND CALCIUM CHLORIDE 100 ML/HR: 600; 310; 30; 20 INJECTION, SOLUTION INTRAVENOUS at 03:32

## 2023-04-19 NOTE — DISCHARGE SUMMARY
[facility]   DISCHARGE SUMMARY    Patient Name: Fanta Hawkins  : 1950  MRN: [id number]    Date of Admission: 2023  Date of Discharge: 2023  Primary Care Physician: Eduard Minaya MD    Consults     No orders found from 3/19/2023 to 2023.          Hospital Course     Presenting Problem:   Vertigo [R42]    Active and resolved problems  Principal Problem:    Vertigo  Overview:  Active Problems:    Dehydration  Overview:    Weakness  Overview:  Resolved Problems:    * No resolved hospital problems. *       Hospital Course:  Fanta Hawkins is a 72 y.o. female 72 y.o. female with past medical history significant for essential hypertension COPD anxiety has been complaining of headaches on and off nausea and also dizziness which looks like vertigo rather than hypotension induced dizziness.  Apparently she started having dizzy spell and then she felt nauseous went to her PCP who sent her to the emergency room from where patient is being admitted.  She already has a MRI of the brain which is unremarkable and her systolic blood pressure is in 130s.  She was treated with IV fluids and antiemetics with improvement during her symptoms.  She was able to tolerate oral intake well and move around without any dizziness, lightheadedness or nausea or vomiting.  Patient is being discharged to follow-up with primary care doctor in 1 week      Vital Signs:  Temp:  [97.2 °F (36.2 °C)-98.3 °F (36.8 °C)] 98.1 °F (36.7 °C)  Heart Rate:  [79-99] 86  Resp:  [18-20] 18  BP: (122-152)/(71-98) 139/83      Discharge Details        Discharge Medications      Continue These Medications      Instructions Start Date   albuterol sulfate  (90 Base) MCG/ACT inhaler  Commonly known as: PROVENTIL HFA;VENTOLIN HFA;PROAIR HFA   2 puffs, Inhalation, Every 4 Hours PRN      busPIRone 10 MG tablet  Commonly known as: BUSPAR   10 mg, Oral, 2 Times Daily PRN      docusate sodium 100 MG capsule   100 mg, Oral, 2 Times  Daily PRN      fluticasone 50 MCG/ACT nasal spray  Commonly known as: FLONASE   1 spray, Each Nare, Daily PRN      HYDROcodone-acetaminophen 5-325 MG per tablet  Commonly known as: NORCO   1 tablet, Oral, 3 Times Daily PRN      meclizine 25 MG tablet  Commonly known as: ANTIVERT   25 mg, Oral, Daily PRN      tiZANidine 4 MG tablet  Commonly known as: ZANAFLEX   4 mg, Oral, Daily PRN      Vitamin D3 50 MCG (2000 UT) capsule   2,000 Units, Oral, Daily             Allergies   Allergen Reactions   • Indomethacin Palpitations and Shortness Of Breath   • Meloxicam Itching and Shortness Of Breath     Other reaction(s): soa, itching   • Moxifloxacin Seizure     Other reaction(s): seizures   • Morphine Hives and Itching     Other reaction(s): hives         Discharge Disposition:  Home    Diet:  As tolerated      Discharge Activity:   As tolerated      CODE STATUS:    Code Status and Medical Interventions:   Ordered at: 04/18/23 0921     Code Status (Patient has no pulse and is not breathing):    CPR (Attempt to Resuscitate)     Medical Interventions (Patient has pulse or is breathing):    Full Support       Time spent on Discharge including face to face service:  30 minutes    Electronically signed by Mitchel Vicente MD, 04/19/23, 9:41 AM EDT.

## 2023-04-19 NOTE — DISCHARGE INSTR - APPOINTMENTS
Follow up appointment with Dr. Tolbert ,On April 26,at 11:30 am    [facility], [facility] [address] [place] [address]

## 2023-04-19 NOTE — PLAN OF CARE
Goal Outcome Evaluation:  Plan of Care Reviewed With: patient        Progress: no change  Outcome Evaluation: patient is alert and oriented this shift. will discharge home this shift.

## 2023-04-19 NOTE — OUTREACH NOTE
Prep Survey    Flowsheet Row Responses   [facility] facility patient discharged from? [facility]   Is LACE score < 7 ? Yes   Eligibility Not Eligible   What are the reasons patient is not eligible? Other  [Low risk for readmission]   Does the patient have one of the following disease processes/diagnoses(primary or secondary)? Other   Prep survey completed? Yes          Denisha YUEN - Registered Nurse

## 2023-04-27 LAB — QT INTERVAL: 382 MS

## 2023-05-24 ENCOUNTER — TRANSCRIBE ORDERS (OUTPATIENT)
Dept: ADMINISTRATIVE | Facility: HOSPITAL | Age: 73
End: 2023-05-24
Payer: MEDICARE

## 2023-05-24 DIAGNOSIS — I73.9 PERIPHERAL VASCULAR DISEASE, UNSPECIFIED: Primary | ICD-10-CM

## 2023-05-25 ENCOUNTER — HOSPITAL ENCOUNTER (OUTPATIENT)
Dept: CARDIOLOGY | Facility: HOSPITAL | Age: 73
Discharge: HOME | End: 2023-05-25
Payer: MEDICARE

## 2023-05-25 DIAGNOSIS — I73.9 PERIPHERAL VASCULAR DISEASE, UNSPECIFIED: ICD-10-CM

## 2023-05-25 LAB
BH CV LOWER ARTERIAL LEFT ABI RATIO: 1.13
BH CV LOWER ARTERIAL LEFT DORSALIS PEDIS SYS MAX: 161
BH CV LOWER ARTERIAL LEFT GREAT TOE SYS MAX: 138
BH CV LOWER ARTERIAL LEFT POST TIBIAL SYS MAX: 157
BH CV LOWER ARTERIAL LEFT TBI RATIO: 0.97
BH CV LOWER ARTERIAL RIGHT ABI RATIO: 1.1
BH CV LOWER ARTERIAL RIGHT DORSALIS PEDIS SYS MAX: 158
BH CV LOWER ARTERIAL RIGHT GREAT TOE SYS MAX: 150
BH CV LOWER ARTERIAL RIGHT POST TIBIAL SYS MAX: 154
BH CV LOWER ARTERIAL RIGHT TBI RATIO: 1.05
MAXIMAL PREDICTED HEART RATE: 150 BPM
STRESS TARGET HR: 128 BPM
UPPER ARTERIAL LEFT ARM BRACHIAL SYS MAX: 131 MMHG
UPPER ARTERIAL RIGHT ARM BRACHIAL SYS MAX: 143 MMHG

## 2023-05-25 PROCEDURE — 93922 UPR/L XTREMITY ART 2 LEVELS: CPT

## 2023-08-08 NOTE — INTERVAL H&P NOTE
H&P updated. The patient was examined and the following changes are noted:  Risks and benefits discussed with patient and allergies reviewed.  We attempted conservative management of small bowel obstruction and patient has failed to progress and her ct still showed obstruction.         Results of CT chest  reviewed    "Fat-containing 9 mm left upper lobe nodule compatible with a benign hamartoma. These can enlarge over time.     Part solid posterior left upper lobe nodule with 5 mm solid component, increased in size and density since 2017.  Recommend follow-up with a chest CT with no contrast in 1 year to exclude a slowly growing adenocarcinoma spectrum lesion.     Severe emphysema with mild diffuse bronchial wall thickening, likely smoking-related chronic bronchitis."

## 2023-09-13 NOTE — PROGRESS NOTES
[facility]  Cardiology progress Note    Patient Name: Fanta Hawkins  : 1950    CHIEF COMPLAINT  Atypical chest pain        Subjective   Subjective     HISTORY OF PRESENT ILLNESS    Fanta Hawkins is a 72 y.o. female with history of atypical chest pain.  Still has some nonspecific chest pain which last for a few minutes nonexertional.    REVIEW OF SYSTEMS    Constitutional:    No fever, no weight loss  Skin:     No rash  Otolaryngeal:    No difficulty swallowing  Cardiovascular: See HPI.  Pulmonary:    No cough, no sputum production    Personal History     Social History:    reports that she quit smoking about a year ago. Her smoking use included cigarettes. She started smoking about 10 years ago. She has a 2.50 pack-year smoking history. She has never been exposed to tobacco smoke. She has never used smokeless tobacco. She reports that she does not drink alcohol and does not use drugs.    Home Medications:  Current Outpatient Medications on File Prior to Visit   Medication Sig    albuterol sulfate  (90 Base) MCG/ACT inhaler Inhale 2 puffs Every 4 (Four) Hours As Needed.    busPIRone (BUSPAR) 10 MG tablet Take 1 tablet by mouth 2 (Two) Times a Day As Needed.    carvedilol (COREG) 3.125 MG tablet Take 1 tablet by mouth 2 (Two) Times a Day With Meals.    Cholecalciferol (Vitamin D3) 50 MCG (2000 UT) capsule Take 1 capsule by mouth Daily.    docusate sodium 100 MG capsule Take 1 capsule by mouth 2 (Two) Times a Day As Needed.    fluticasone (FLONASE) 50 MCG/ACT nasal spray 1 spray by Each Nare route Daily As Needed.    HYDROcodone-acetaminophen (NORCO) 5-325 MG per tablet Take 1 tablet by mouth 3 (Three) Times a Day As Needed.    meclizine (ANTIVERT) 25 MG tablet Take 1 tablet by mouth Daily As Needed for Dizziness.    tiZANidine (ZANAFLEX) 4 MG tablet Take 1 tablet by mouth Daily As Needed for Muscle Spasms.     No current facility-administered medications on file prior to visit.        Past Medical History:   Diagnosis Date    Arthritis     Asthma     COPD (chronic obstructive pulmonary disease) 08/15/2022    Diabetes mellitus     Essential hypertension 08/24/2022    Injury of back     Mood disorder     S/P exploratory laparotomy with lysis of adhesions 08/22/2022    Sleep apnea        Allergies:  Allergies   Allergen Reactions    Indomethacin Palpitations and Shortness Of Breath    Meloxicam Itching and Shortness Of Breath     Other reaction(s): soa, itching    Moxifloxacin Seizure     Other reaction(s): seizures    Morphine Hives and Itching     Other reaction(s): hives       Objective    Objective       Vitals:   Heart Rate:  [69] 69  BP: (146)/(84) 146/84  Body mass index is 19.68 kg/m².     PHYSICAL EXAM:    General Appearance:   well developed  well nourished  HENT:   oropharynx moist  lips not cyanotic  Neck:  thyroid not enlarged  supple  Respiratory:  no respiratory distress  normal breath sounds  no rales  Cardiovascular:  no jugular venous distention  regular rhythm  apical impulse normal  S1 normal, S2 normal  no S3, no S4   no murmur  no rub, no thrill  carotid pulses normal; no bruit  pedal pulses normal  lower extremity edema: none    Skin:   warm, dry  Psychiatric:  judgement and insight appropriate  normal mood and affect        Result Review:  I have personally reviewed the available results from  [x]  Laboratory  [x]  EKG  [x]  Cardiology  [x]  Medications  [x]  Old records  []  Other:     Procedures       Impression/Plan:  1.  Precordial atypical chest pain: Sestamibi stress test evaluate for ischemia.  Echocardiogram.  2.  Positive nicotine use: Smoking cessation discussed with patient.  3.  Shortness of breath/COPD: Stable.  Continue current bronchodilators.  4.  Essential hypertension controlled: Increase carvedilol to 6.25 mg twice a day.  Monitor blood pressure regularly.      Eric Gaston MD   09/15/23   08:46 EDT

## 2023-09-15 ENCOUNTER — OFFICE VISIT (OUTPATIENT)
Dept: CARDIOLOGY | Facility: CLINIC | Age: 73
End: 2023-09-15
Payer: MEDICARE

## 2023-09-15 VITALS
HEART RATE: 69 BPM | BODY MASS INDEX: 19.8 KG/M2 | DIASTOLIC BLOOD PRESSURE: 84 MMHG | SYSTOLIC BLOOD PRESSURE: 146 MMHG | HEIGHT: 62 IN | WEIGHT: 107.6 LBS

## 2023-09-15 DIAGNOSIS — I10 HYPERTENSION, ESSENTIAL: Primary | ICD-10-CM

## 2023-09-15 DIAGNOSIS — R07.9 CHEST PAIN, UNSPECIFIED TYPE: ICD-10-CM

## 2023-09-15 DIAGNOSIS — R06.02 SHORTNESS OF BREATH: ICD-10-CM

## 2023-09-15 PROCEDURE — 3077F SYST BP >= 140 MM HG: CPT | Performed by: SPECIALIST

## 2023-09-15 PROCEDURE — 3079F DIAST BP 80-89 MM HG: CPT | Performed by: SPECIALIST

## 2023-09-15 PROCEDURE — 99214 OFFICE O/P EST MOD 30 MIN: CPT | Performed by: SPECIALIST

## 2023-09-15 PROCEDURE — 1160F RVW MEDS BY RX/DR IN RCRD: CPT | Performed by: SPECIALIST

## 2023-09-15 PROCEDURE — 1159F MED LIST DOCD IN RCRD: CPT | Performed by: SPECIALIST

## 2023-09-15 RX ORDER — CARVEDILOL 6.25 MG/1
6.25 TABLET ORAL 2 TIMES DAILY WITH MEALS
Qty: 180 TABLET | Refills: 6 | Status: SHIPPED | OUTPATIENT
Start: 2023-09-15

## 2023-09-15 RX ORDER — CARVEDILOL 3.12 MG/1
3.12 TABLET ORAL 2 TIMES DAILY WITH MEALS
COMMUNITY
End: 2023-09-15 | Stop reason: SDUPTHER

## 2023-10-16 ENCOUNTER — TELEPHONE (OUTPATIENT)
Dept: CARDIOLOGY | Facility: CLINIC | Age: 73
End: 2023-10-16
Payer: MEDICARE

## 2023-10-16 DIAGNOSIS — I27.20 PULMONARY HYPERTENSION: ICD-10-CM

## 2023-10-16 DIAGNOSIS — R06.02 SHORTNESS OF BREATH: Primary | ICD-10-CM

## 2023-10-16 NOTE — TELEPHONE ENCOUNTER
----- Message from JIN Silvestre sent at 10/16/2023 11:25 AM EDT -----  Notify pt echo result:  Normal left ventricular systolic function. EF 62%  Trace aortic and mitral regurgitation.  Moderate tricuspid regurgitation with moderate pulmonary hypertension.  Estimated pulmonary artery systolic pressure around 45 to 50 mm. Refer to pulmonology and continue with stress test on 10/20.

## 2023-10-20 ENCOUNTER — TELEPHONE (OUTPATIENT)
Dept: CARDIOLOGY | Facility: CLINIC | Age: 73
End: 2023-10-20
Payer: MEDICARE

## 2023-10-20 ENCOUNTER — HOSPITAL ENCOUNTER (OUTPATIENT)
Dept: NUCLEAR MEDICINE | Facility: HOSPITAL | Age: 73
Discharge: HOME | End: 2023-10-20
Payer: MEDICARE

## 2023-10-20 DIAGNOSIS — R07.9 CHEST PAIN, UNSPECIFIED TYPE: ICD-10-CM

## 2023-10-20 LAB
BH CV IMMEDIATE POST TECH DATA BLOOD PRESSURE: NORMAL MMHG
BH CV IMMEDIATE POST TECH DATA HEART RATE: 115 BPM
BH CV IMMEDIATE POST TECH DATA OXYGEN SATS: 98 %
BH CV REST NUCLEAR ISOTOPE DOSE: 8.9 MCI
BH CV SIX MINUTE RECOVERY TECH DATA BLOOD PRESSURE: NORMAL
BH CV SIX MINUTE RECOVERY TECH DATA HEART RATE: 100 BPM
BH CV SIX MINUTE RECOVERY TECH DATA OXYGEN SATURATION: 97 %
BH CV STRESS BP STAGE 1: NORMAL
BH CV STRESS COMMENTS STAGE 1: NORMAL
BH CV STRESS DOSE REGADENOSON STAGE 1: 0.4
BH CV STRESS DURATION MIN STAGE 1: 0
BH CV STRESS DURATION SEC STAGE 1: 10
BH CV STRESS HR STAGE 1: 110
BH CV STRESS NUCLEAR ISOTOPE DOSE: 34.8 MCI
BH CV STRESS O2 STAGE 1: 98
BH CV STRESS PROTOCOL 1: NORMAL
BH CV STRESS RECOVERY BP: NORMAL MMHG
BH CV STRESS RECOVERY HR: 100 BPM
BH CV STRESS RECOVERY O2: 98 %
BH CV STRESS STAGE 1: 1
BH CV THREE MINUTE POST TECH DATA BLOOD PRESSURE: NORMAL MMHG
BH CV THREE MINUTE POST TECH DATA HEART RATE: 107 BPM
BH CV THREE MINUTE POST TECH DATA OXYGEN SATURATION: 98 %
LV EF NUC BP: 64 %
MAXIMAL PREDICTED HEART RATE: 150 BPM
PERCENT MAX PREDICTED HR: 76.67 %
STRESS BASELINE BP: NORMAL MMHG
STRESS BASELINE HR: 72 BPM
STRESS O2 SAT REST: 97 %
STRESS PERCENT HR: 90 %
STRESS POST O2 SAT PEAK: 99 %
STRESS POST PEAK BP: NORMAL MMHG
STRESS POST PEAK HR: 115 BPM
STRESS TARGET HR: 128 BPM

## 2023-10-20 PROCEDURE — 0 TECHNETIUM TETROFOSMIN KIT: Performed by: SPECIALIST

## 2023-10-20 PROCEDURE — 93017 CV STRESS TEST TRACING ONLY: CPT

## 2023-10-20 PROCEDURE — 25010000002 REGADENOSON 0.4 MG/5ML SOLUTION: Performed by: SPECIALIST

## 2023-10-20 PROCEDURE — 78452 HT MUSCLE IMAGE SPECT MULT: CPT

## 2023-10-20 PROCEDURE — A9502 TC99M TETROFOSMIN: HCPCS | Performed by: SPECIALIST

## 2023-10-20 RX ORDER — ASPIRIN 81 MG/1
81 TABLET ORAL DAILY
Qty: 90 TABLET | Refills: 3 | Status: SHIPPED | OUTPATIENT
Start: 2023-10-20

## 2023-10-20 RX ORDER — REGADENOSON 0.08 MG/ML
0.4 INJECTION, SOLUTION INTRAVENOUS
Status: COMPLETED | OUTPATIENT
Start: 2023-10-20 | End: 2023-10-20

## 2023-10-20 RX ADMIN — TETROFOSMIN 1 DOSE: 1.38 INJECTION, POWDER, LYOPHILIZED, FOR SOLUTION INTRAVENOUS at 07:55

## 2023-10-20 RX ADMIN — TETROFOSMIN 1 DOSE: 1.38 INJECTION, POWDER, LYOPHILIZED, FOR SOLUTION INTRAVENOUS at 09:23

## 2023-10-20 RX ADMIN — REGADENOSON 0.4 MG: 0.08 INJECTION, SOLUTION INTRAVENOUS at 09:23

## 2023-10-20 NOTE — TELEPHONE ENCOUNTER
----- Message from JIN Silvestre sent at 10/20/2023 11:43 AM EDT -----  Notify pt stress test result shows small area of mild perfusion defect in the distal anterior segment without reversibility on resting images, more likely related to attenuation artifact. Recommend aspirin 81 mg daily.   Follow up as scheduled, notify office if she develops new or worsening symptoms.

## 2024-04-18 ENCOUNTER — OFFICE VISIT (OUTPATIENT)
Dept: ORTHOPEDIC SURGERY | Facility: CLINIC | Age: 74
End: 2024-04-18
Payer: MEDICARE

## 2024-04-18 VITALS
HEIGHT: 62 IN | WEIGHT: 107 LBS | HEART RATE: 76 BPM | BODY MASS INDEX: 19.69 KG/M2 | DIASTOLIC BLOOD PRESSURE: 80 MMHG | SYSTOLIC BLOOD PRESSURE: 125 MMHG | OXYGEN SATURATION: 95 %

## 2024-04-18 DIAGNOSIS — M79.641 RIGHT HAND PAIN: Primary | ICD-10-CM

## 2024-04-18 DIAGNOSIS — M19.041 OSTEOARTHRITIS OF RIGHT HAND, UNSPECIFIED OSTEOARTHRITIS TYPE: ICD-10-CM

## 2024-04-18 DIAGNOSIS — M79.642 LEFT HAND PAIN: ICD-10-CM

## 2024-04-18 DIAGNOSIS — M19.042 OSTEOARTHRITIS OF LEFT HAND, UNSPECIFIED OSTEOARTHRITIS TYPE: ICD-10-CM

## 2024-04-18 RX ORDER — BUPROPION HYDROCHLORIDE 150 MG/1
TABLET ORAL EVERY 24 HOURS
COMMUNITY

## 2024-04-18 RX ORDER — HYDROCODONE BITARTRATE AND ACETAMINOPHEN 5; 325 MG/1; MG/1
1 TABLET ORAL 3 TIMES DAILY
COMMUNITY

## 2024-04-18 RX ORDER — LORATADINE 10 MG/1
1 TABLET ORAL DAILY
COMMUNITY

## 2024-04-18 RX ORDER — OMEPRAZOLE 10 MG/1
10 CAPSULE, DELAYED RELEASE ORAL EVERY 24 HOURS
COMMUNITY

## 2024-04-18 NOTE — PROGRESS NOTES
"Chief Complaint  Initial Evaluation of the Left Hand and Initial Evaluation of the Right Hand     Subjective      Fanta Hawkins presents to [facility] for initial evaluation of bilateral hands. She is having pain in bilateral hands for awhile.  She has difficulty with FMC and  strength.  She has decrease  and difficulty closing.  She notes some triggering and \"rope things\" on the ulnar part of the wrist/hand. She has bad arthritis of bilateral hands.  She notes burning and hurting. She takes Diclofenac for anti inflammatory.     Allergies   Allergen Reactions    Indomethacin Palpitations and Shortness Of Breath    Meloxicam Itching and Shortness Of Breath     Other reaction(s): soa, itching    Moxifloxacin Seizure     Other reaction(s): seizures    Morphine Hives and Itching     Other reaction(s): hives        Social History     Socioeconomic History    Marital status:    Tobacco Use    Smoking status: Former     Current packs/day: 0.00     Average packs/day: 0.3 packs/day for 10.0 years (2.5 ttl pk-yrs)     Types: Cigarettes     Start date:      Quit date: 2022     Years since quittin.6     Passive exposure: Never    Smokeless tobacco: Never   Vaping Use    Vaping status: Never Used   Substance and Sexual Activity    Alcohol use: Never    Drug use: Never    Sexual activity: Defer        I reviewed the patient's chief complaint, history of present illness, review of systems, past medical history, surgical history, family history, social history, medications, and allergy list.     Review of Systems     Constitutional: Denies fevers, chills, weight loss  Cardiovascular: Denies chest pain, shortness of breath  Skin: Denies rashes, acute skin changes  Neurologic: Denies headache, loss of consciousness        Vital Signs:   /80   Pulse 76   Ht 157.5 cm (62\")   Wt 48.5 kg (107 lb)   SpO2 95%   BMI 19.57 kg/m²          Physical Exam  General: Alert. No " acute distress    Ortho Exam        BILATERAL HANDS Negative Compression testing/ Negative Tinels. NegativeFinkelsteins. Negative Oneill's testing. Negative CMC grind testing. Negative Phalens. Full ROM of the hand, fingers, elbow and wrist. Positive Triggering of the digit. Sensation grossly intact to light touch, median, radial and ulnar nerve. Positive AIN, PIN and ulnar nerve motor function intact. Axillary nerve intact. Positive pulses.        Procedures      Imaging Results (Most Recent)       Procedure Component Value Units Date/Time    XR Hand 3+ View Right [[id number]] Resulted: 04/18/24 0918     Updated: 04/18/24 0929    XR Hand 3+ View Left [[id number]] Resulted: 04/18/24 0918     Updated: 04/18/24 0929             Result Review :     X-Ray Report:  Right hand  X-Ray  Indication: Evaluation of the right hand  AP/Lateral view(s)  Findings: Severe arthritis of bilateral hands.   Prior studies available for comparison: no     X-Ray Report:  Left hand  X-Ray  Indication: Evaluation of the left hand  AP/Lateral view(s)  Findings: Severe arthritis of bilateral hosea.   Prior studies available for comparison: yes             Assessment and Plan     Diagnoses and all orders for this visit:    1. Right hand pain (Primary)  -     XR Hand 3+ View Right  -     Ambulatory Referral to Rheumatology  -     Ambulatory Referral to Physical Therapy Evaluate and treat (2-3 times a week for 4-6 weeks), Ortho    2. Left hand pain  -     XR Hand 3+ View Left  -     Ambulatory Referral to Rheumatology  -     Ambulatory Referral to Physical Therapy Evaluate and treat (2-3 times a week for 4-6 weeks), Ortho    3. Osteoarthritis of right hand, unspecified osteoarthritis type    4. Osteoarthritis of left hand, unspecified osteoarthritis type        Discussed the treatment plan with the patient. I reviewed the X-rays that were obtained today with the patient.     Refer to rheumatologist.  Prescribed physical therapy.       Call or  return if worsening symptoms.    Follow Up     PRN      Patient was given instructions and counseling regarding her condition or for health maintenance advice. Please see specific information pulled into the AVS if appropriate.     Scribed for Zahira Lemus MD by Rafaela Ayon MA.  04/18/24   09:12 EDT    I have personally performed the services described in this document as scribed by the above individual and it is both accurate and complete. Zahira Lemus MD 04/18/24

## 2024-05-01 ENCOUNTER — TREATMENT (OUTPATIENT)
Dept: PHYSICAL THERAPY | Facility: CLINIC | Age: 74
End: 2024-05-01
Payer: MEDICARE

## 2024-05-01 DIAGNOSIS — M79.642 BILATERAL HAND PAIN: Primary | ICD-10-CM

## 2024-05-01 DIAGNOSIS — M79.641 BILATERAL HAND PAIN: Primary | ICD-10-CM

## 2024-05-01 PROCEDURE — 97166 OT EVAL MOD COMPLEX 45 MIN: CPT | Performed by: PHYSICAL THERAPIST

## 2024-05-01 PROCEDURE — 97112 NEUROMUSCULAR REEDUCATION: CPT | Performed by: PHYSICAL THERAPIST

## 2024-05-01 NOTE — PROGRESS NOTES
"Outpatient Occupational Therapy Ortho Initial Evaluation                                 [address] [place] [address]    Patient: Fanta Hawkins   : 1950  Diagnosis/ICD-10 Code:  Bilateral hand pain [M79.641, M79.642]  Referring practitioner: Zahira Lemus MD  Date of Initial Visit: 2024  Today's Date: 2024  Patient seen for 1 sessions               Subjective Questionnaire: QuickDASH: 36/55    Past Medical History: arthritis, COPD, hypertension, back injury, mood disorder, sleep apnea    Subjective Evaluation    History of Present Illness  Mechanism of injury: Pt reports increased pain in B hands over the past 6 months. Pt saw Dr. Lemus and is referred for Occupational Therapy and Rheumatologist. Pt reports triggering in B hands, pain, numbness and tingling. Pt reports dropping things at home.    Quality of life: good    Pain  Current pain ratin  Location: B hand  Quality: burning, dull ache, sharp and throbbing  Relieving factors: heat and medications    Social Support  Lives with: alone    Hand dominance: left    Patient Goals  Patient goals for therapy: decreased pain  Patient goal: \"Get them where I can use them.\"         Objective          Observations     Left Wrist/Hand   Positive for Indy's nodes, Heberden's nodes and ulnar drift.     Right Wrist/Hand   Positive for Indy's nodes, Heberden's nodes and ulnar deviation.     Additional Wrist/Hand Observation Details  Hyperextension of B thumb IP joints, radial deviation of middle finger DIP     Tenderness     Additional Tenderness Details  Tenderness B palms and digits    Neurological Testing     Sensation     Wrist/Hand   Left   Intact: light touch    Right   Intact: light touch    Active Range of Motion     Left Wrist   Wrist flexion: 55 degrees   Wrist extension: 40 degrees     Right Wrist   Wrist flexion: 60 degrees   Wrist extension: 45 degrees     Additional Active Range of Motion Details  R hand lacks 3 cm to " DPC index and middle finger and 5 cm ring and small, L lacks 4 cm to DPC, thumb opposition to side of small finger    Strength/Myotome Testing     Left Wrist/Hand      (2nd hand position)     Trial 1: 6 lbs    Trial 2: 7 lbs    Trial 3: 8 lbs    Average: 7 lbs    Right Wrist/Hand      (2nd hand position)     Trial 1: 10 lbs    Trial 2: 10 lbs    Trial 3: 10 lbs    Average: 10 lbs          Assessment & Plan       Assessment  Impairments: abnormal coordination, abnormal or restricted ROM, activity intolerance, impaired physical strength and pain with function   Other impairment: difficulty making bed, pouring cup of coffee, buttoning, zipping, tying, donning parts  Functional limitations: carrying objects, lifting, pulling and pushing   Prognosis: good    Goals  Plan Goals: 1. The patient complains of pain in the B hands.                  LTG 1: 12 weeks:  The patient will report a pain rating of 3/10 or better in order to improve sleep quality and tolerance to performance of activities of daily living.                                  STATUS:  New                  STG 1a: 6 weeks:  The patient will report a pain rating of 5/10 or better.                                   STATUS:  New  2. The patient has limited ROM of the B hands.                  LTG 2: 12 weeks:  The patient will demonstrate full loose fist B hands to allow the patient to make her bed and pour coffee.                                  STATUS:  New                   STG 2a: 6 weeks:  The patient will demonstrate composite fist lacking 2 cm to DPC.                                  STATUS:  New                             3. The patient has limited strength of the B hands.                  LTG 3: 12 weeks:  The patient will demonstrate 20 lbs B  strength in order to open jars and lift coffee pot.                                  STATUS:  New                  STG 3a: 6 weeks:  The patient will demonstrate 15 lbs B  strength.                                   STATUS:  New  4. Carrying, Moving, and Handling Objects Functional Limitation                                    LTG 4: 12 weeks:  The patient will achieve a score of 15/55 on the Quick DASH.                                  STATUS:  New                  STG 4a: 6 weeks:  The patient will achieve a score of 25/55 on the Quick DASH.                                    STATUS:  New                        Plan  Planned modality interventions: TENS, thermotherapy (hydrocollator packs) and thermotherapy (paraffin bath)  Planned therapy interventions: manual therapy, functional ROM exercises, fine motor coordination training, flexibility, stretching, strengthening, home exercise program, neuromuscular re-education, soft tissue mobilization and therapeutic activities  Frequency: 2x week  Duration in weeks: 12  Treatment plan discussed with: patient          ICD-10-CM ICD-9-CM   1. Bilateral hand pain  M79.641 729.5    M79.642        Patient is indicated for skilled occupational therapy services.    History # of Personal Factors and/or Comorbidities: HIGH (3+)  Examination of Body System(s): # of elements: MODERATE (3)  Clinical Presentation: STABLE   Clinical Decision Making: MODERATE     Evaluation:  Low Complexity:    0     mins  51681;  Mod Complexity:    45     mins  72242;  High Complexity:    0     mins  47445;    Timed:  Manual Therapy:    0     mins  43316;  Therapeutic Exercise:    10     mins  61077;   Therapeutic Activity:    0     mins  90185;  Neuromuscular Hollis:    10    mins  74831;    Ultrasound:     0     mins  84327;    Electrical Stimulation:    0     mins  42259 ( );    Timed Treatment:   65   mins   Total Treatment:     65   mins      OT SIGNATURE: LISSETTE Henry/L    Electronically signed   License number [id number]   DATE TREATMENT INITIATED: 5/1/2024    Initial Certification  Certification Period: 5/1/2024 thru 7/29/2024  I certify that the therapy services are furnished  while this patient is under my care.  The services outlined above are required by this patient, and will be reviewed every 90 days.     PHYSICIAN: Zahira Lemus MD  NPI: [id number]                                          DATE:     Please sign and return via fax to  [phone]   Thank you, [facility].

## 2024-05-08 ENCOUNTER — TREATMENT (OUTPATIENT)
Dept: PHYSICAL THERAPY | Facility: CLINIC | Age: 74
End: 2024-05-08
Payer: MEDICARE

## 2024-05-08 DIAGNOSIS — M79.642 BILATERAL HAND PAIN: Primary | ICD-10-CM

## 2024-05-08 DIAGNOSIS — M79.641 BILATERAL HAND PAIN: Primary | ICD-10-CM

## 2024-05-08 PROCEDURE — 97112 NEUROMUSCULAR REEDUCATION: CPT | Performed by: PHYSICAL THERAPIST

## 2024-05-08 PROCEDURE — 97110 THERAPEUTIC EXERCISES: CPT | Performed by: PHYSICAL THERAPIST

## 2024-05-10 ENCOUNTER — TREATMENT (OUTPATIENT)
Dept: PHYSICAL THERAPY | Facility: CLINIC | Age: 74
End: 2024-05-10
Payer: MEDICARE

## 2024-05-10 DIAGNOSIS — M79.641 BILATERAL HAND PAIN: Primary | ICD-10-CM

## 2024-05-10 DIAGNOSIS — M79.642 BILATERAL HAND PAIN: Primary | ICD-10-CM

## 2024-05-10 PROCEDURE — G0283 ELEC STIM OTHER THAN WOUND: HCPCS | Performed by: PHYSICAL THERAPIST

## 2024-05-10 PROCEDURE — 97110 THERAPEUTIC EXERCISES: CPT | Performed by: PHYSICAL THERAPIST

## 2024-05-10 PROCEDURE — 97112 NEUROMUSCULAR REEDUCATION: CPT | Performed by: PHYSICAL THERAPIST

## 2024-05-14 ENCOUNTER — TREATMENT (OUTPATIENT)
Dept: PHYSICAL THERAPY | Facility: CLINIC | Age: 74
End: 2024-05-14
Payer: MEDICARE

## 2024-05-14 DIAGNOSIS — M79.642 BILATERAL HAND PAIN: Primary | ICD-10-CM

## 2024-05-14 DIAGNOSIS — M79.641 BILATERAL HAND PAIN: Primary | ICD-10-CM

## 2024-05-14 PROCEDURE — 97112 NEUROMUSCULAR REEDUCATION: CPT | Performed by: PHYSICAL THERAPIST

## 2024-05-14 PROCEDURE — 97110 THERAPEUTIC EXERCISES: CPT | Performed by: PHYSICAL THERAPIST

## 2024-05-14 NOTE — PROGRESS NOTES
Occupational Therapy Daily Treatment Note  [address] [place] [address]      Patient: Fanta Hawkins   : 1950  Referring practitioner: Zahira Lemus MD  Date of Initial Visit: Type: THERAPY  Noted: 2024  Today's Date: 2024  Patient seen for 4 sessions         Subjective   Fanta Hawkins reports: increased pain up her arms after leaving therapy last time even though the TENS felt good while she was wearing it. I can tell a difference in my hands cause I could not get the sheets on and now I can.    Objective   See Exercise, Manual, and Modality Logs for complete treatment.   Desensitization added with spiky ball today with good tolerance. TENS not used today due to increased pain after last visit.     Assessment/Plan    Visit Diagnoses:    ICD-10-CM ICD-9-CM   1. Bilateral hand pain  M79.641 729.5    M79.642        Continue per POC.         Timed:  Manual Therapy:    0     mins  00240;  Therapeutic Exercise:    15     mins  84011;     Therapeutic Activity:    10     mins  58955;  Ultrasound:     0     mins  95635;    Electrical Stimulation:    0     mins 10582;  Neuromuscular Hollis:    10    mins  76478;      Timed Treatment:   35   mins   Total Treatment:     35   min    LISSETTE Henry/L  Occupational Therapist    Electronically signed   License number [id number]

## 2024-05-17 ENCOUNTER — TREATMENT (OUTPATIENT)
Dept: PHYSICAL THERAPY | Facility: CLINIC | Age: 74
End: 2024-05-17
Payer: MEDICARE

## 2024-05-17 DIAGNOSIS — M79.642 BILATERAL HAND PAIN: Primary | ICD-10-CM

## 2024-05-17 DIAGNOSIS — M79.641 BILATERAL HAND PAIN: Primary | ICD-10-CM

## 2024-05-17 PROCEDURE — 97110 THERAPEUTIC EXERCISES: CPT | Performed by: PHYSICAL THERAPIST

## 2024-05-17 PROCEDURE — 97530 THERAPEUTIC ACTIVITIES: CPT | Performed by: PHYSICAL THERAPIST

## 2024-05-17 PROCEDURE — 97112 NEUROMUSCULAR REEDUCATION: CPT | Performed by: PHYSICAL THERAPIST

## 2024-05-17 NOTE — PROGRESS NOTES
Occupational Therapy Daily Treatment Note  [address] [place] [address]      Patient: Fanta Hawkins   : 1950  Referring practitioner: Zahira Lemus MD  Date of Initial Visit: Type: THERAPY  Noted: 2024  Today's Date: 2024  Patient seen for 5 sessions         Subjective Evaluation    Pain  Current pain ratin  Location: B hands         Fanta Hawkins reports: My R hand is more stiff today.     Objective          Observations     Left Wrist/Hand   Positive for Indy's nodes, Heberden's nodes and ulnar drift.     Right Wrist/Hand   Positive for Indy's nodes, Heberden's nodes and ulnar deviation.     Additional Wrist/Hand Observation Details  Hyperextension of B thumb IP joints, radial deviation of middle finger DIP     Tenderness     Additional Tenderness Details  Tenderness B palms and digits    Neurological Testing     Sensation     Wrist/Hand   Left   Intact: light touch    Right   Intact: light touch    Active Range of Motion     Left Wrist   Wrist flexion: 55 degrees   Wrist extension: 45 degrees     Right Wrist   Wrist flexion: 55 degrees   Wrist extension: 35 degrees     Additional Active Range of Motion Details  R hand lacks 2 cm index, 3 cm middle and 4 cm to DPC ring and small   L lacks 3 cm to DPC  B thumb opposition to side of small finger    Strength/Myotome Testing     Left Wrist/Hand      (2nd hand position)     Trial 1: 7 lbs    Trial 2: 7 lbs    Trial 3: 8 lbs    Average: 7.33 lbs    Right Wrist/Hand      (2nd hand position)     Trial 1: 7 lbs    Trial 2: 5 lbs    Trial 3: 7 lbs    Average: 6.33 lbs      See Exercise, Manual, and Modality Logs for complete treatment.       Assessment/Plan    Visit Diagnoses:    ICD-10-CM ICD-9-CM   1. Bilateral hand pain  M79.641 729.5    M79.642        Continue per POC         Timed:  Manual Therapy:    0     mins  07741;  Therapeutic Exercise:    10     mins  47957;     Therapeutic Activity:    10     mins   38428;  Ultrasound:     0     mins  83552;    Electrical Stimulation:    0     mins 98842;  Neuromuscular Hollis:    10    mins  87934;      Timed Treatment:   30   mins   Total Treatment:     30   min    LISSETTE Henry/L  Occupational Therapist    Electronically signed   License number [id number]

## 2024-05-21 ENCOUNTER — TREATMENT (OUTPATIENT)
Dept: PHYSICAL THERAPY | Facility: CLINIC | Age: 74
End: 2024-05-21
Payer: MEDICARE

## 2024-05-21 DIAGNOSIS — M79.642 BILATERAL HAND PAIN: Primary | ICD-10-CM

## 2024-05-21 DIAGNOSIS — M79.641 BILATERAL HAND PAIN: Primary | ICD-10-CM

## 2024-05-21 PROCEDURE — 97112 NEUROMUSCULAR REEDUCATION: CPT | Performed by: PHYSICAL THERAPIST

## 2024-05-21 PROCEDURE — 97110 THERAPEUTIC EXERCISES: CPT | Performed by: PHYSICAL THERAPIST

## 2024-05-21 NOTE — PROGRESS NOTES
Occupational Therapy Daily Treatment Note  [address] [place] [address]      Patient: Fanta Hawkins   : 1950  Referring practitioner: Zahira Lemus MD  Date of Initial Visit: Type: THERAPY  Noted: 2024  Today's Date: 2024  Patient seen for 6 sessions         Subjective   Fanta Shruthi reports: I don't have any pain right now.    Objective   See Exercise, Manual, and Modality Logs for complete treatment.   Pt able to use 1 lb weight today for wrist 3 planes. Pt performed all range of motion and strengthening exercises without pain today. Pt still has not heard from Rheumatologist with appointment yet.    Assessment/Plan    Visit Diagnoses:    ICD-10-CM ICD-9-CM   1. Bilateral hand pain  M79.641 729.5    M79.642        Continue per POC         Timed:  Manual Therapy:    0     mins  75719;  Therapeutic Exercise:    15     mins  47404;     Therapeutic Activity:    10     mins  79272;  Ultrasound:     00     mins  59642;    Electrical Stimulation:    0     mins 32700;  Neuromuscular Hollis:    10    mins  39697;      Timed Treatment:   35   mins   Total Treatment:     35   min    LISSETTE Henry/L  Occupational Therapist    Electronically signed   License number [id number]

## 2024-05-24 ENCOUNTER — TREATMENT (OUTPATIENT)
Dept: PHYSICAL THERAPY | Facility: CLINIC | Age: 74
End: 2024-05-24
Payer: MEDICARE

## 2024-05-24 DIAGNOSIS — M79.641 BILATERAL HAND PAIN: Primary | ICD-10-CM

## 2024-05-24 DIAGNOSIS — M79.642 BILATERAL HAND PAIN: Primary | ICD-10-CM

## 2024-05-24 PROCEDURE — 97110 THERAPEUTIC EXERCISES: CPT | Performed by: PHYSICAL THERAPIST

## 2024-05-24 PROCEDURE — 97112 NEUROMUSCULAR REEDUCATION: CPT | Performed by: PHYSICAL THERAPIST

## 2024-05-24 NOTE — PROGRESS NOTES
Occupational Therapy Daily Treatment Note  [address] [place] [address]      Patient: Fanta Hawkins   : 1950  Referring practitioner: Zahira Lemus MD  Date of Initial Visit: Type: THERAPY  Noted: 2024  Today's Date: 2024  Patient seen for 7 sessions         Subjective Evaluation    Pain  Current pain ratin  Location: B wrist         Fanta Hawkins reports: the pain is mostly in the wrist today the L worse than R.     Objective   See Exercise, Manual, and Modality Logs for complete treatment.   Pt performed range of motion for B hands and wrist with some discomfort with her L hand and wrist.     Assessment/Plan    Visit Diagnoses:    ICD-10-CM ICD-9-CM   1. Bilateral hand pain  M79.641 729.5    M79.642        Continue per POC         Timed:  Manual Therapy:    0     mins  58320;  Therapeutic Exercise:    10     mins  47046;     Therapeutic Activity:    10     mins  43096;  Ultrasound:     0     mins  12429;    Electrical Stimulation:    0     mins 39707;  Neuromuscular Hollis:    10    mins  18669;      Timed Treatment:   30   mins   Total Treatment:     30   min    LISSETTE Henry/L  Occupational Therapist    Electronically signed   License number [id number]

## 2024-05-28 ENCOUNTER — TREATMENT (OUTPATIENT)
Dept: PHYSICAL THERAPY | Facility: CLINIC | Age: 74
End: 2024-05-28
Payer: MEDICARE

## 2024-05-28 DIAGNOSIS — M79.642 BILATERAL HAND PAIN: Primary | ICD-10-CM

## 2024-05-28 DIAGNOSIS — M79.641 BILATERAL HAND PAIN: Primary | ICD-10-CM

## 2024-05-28 PROCEDURE — 97110 THERAPEUTIC EXERCISES: CPT | Performed by: PHYSICAL THERAPIST

## 2024-05-28 PROCEDURE — 97112 NEUROMUSCULAR REEDUCATION: CPT | Performed by: PHYSICAL THERAPIST

## 2024-05-28 NOTE — PROGRESS NOTES
Occupational Therapy Daily Treatment Note  [address] [place] [address]      Patient: Fanta Hawkins   : 1950  Referring practitioner: Zahira Lemus MD  Date of Initial Visit: Type: THERAPY  Noted: 2024  Today's Date: 2024  Patient seen for 8 sessions         Subjective Evaluation    Pain  Current pain ratin  Location: B hands         Fanta Hawkins reports:  It hurt really bad yesterday. It is burning too in the palm    Objective          Observations     Left Wrist/Hand   Positive for Indy's nodes, Heberden's nodes and ulnar drift.     Right Wrist/Hand   Positive for Indy's nodes, Heberden's nodes and ulnar deviation.     Additional Wrist/Hand Observation Details  Hyperextension of B thumb IP joints, radial deviation of middle finger DIP     Tenderness     Additional Tenderness Details  Tenderness B palms and digits    Neurological Testing     Sensation     Wrist/Hand   Left   Intact: light touch    Right   Intact: light touch    Active Range of Motion     Left Wrist   Wrist flexion: 40 degrees   Wrist extension: 55 degrees     Right Wrist   Wrist flexion: 45 degrees   Wrist extension: 40 degrees     Additional Active Range of Motion Details  R hand lacks 3 cm index and middle, 5 cm to DPC ring, and 4 cm small   L lacks 3 cm to DPC  B thumb opposition to side of small finger    Strength/Myotome Testing     Left Wrist/Hand      (2nd hand position)     Trial 1: 5 lbs    Trial 2: 6 lbs    Trial 3: 6 lbs    Average: 5.67 lbs    Right Wrist/Hand      (2nd hand position)     Trial 1: 7 lbs    Trial 2: 5 lbs    Trial 3: 5 lbs    Average: 5.67 lbs      See Exercise, Manual, and Modality Logs for complete treatment.       Assessment & Plan       Goals  Plan Goals: Goals  Plan Goals: 1. The patient complains of pain in the B hands.                  LTG 1: 12 weeks:  The patient will report a pain rating of 3/10 or better in order to improve sleep quality and tolerance to  performance of activities of daily living.                                  STATUS:  Not met                  STG 1a: 6 weeks:  The patient will report a pain rating of 5/10 or better.                                   STATUS:  Not met  2. The patient has limited ROM of the B hands.                  LTG 2: 12 weeks:  The patient will demonstrate full loose fist B hands to allow the patient to make her bed and pour coffee.                                  STATUS:  Not met                  STG 2a: 6 weeks:  The patient will demonstrate composite fist lacking 2 cm to DPC.                                  STATUS:  Not met                             3. The patient has limited strength of the B hands.                  LTG 3: 12 weeks:  The patient will demonstrate 20 lbs B  strength in order to open jars and lift coffee pot.                                  STATUS:  Not met                  STG 3a: 6 weeks:  The patient will demonstrate 15 lbs B  strength.                                  STATUS:  Not met  4. Carrying, Moving, and Handling Objects Functional Limitation                                    LTG 4: 12 weeks:  The patient will achieve a score of 15/55 on the Quick DASH.                                  STATUS:  Not met                  STG 4a: 6 weeks:  The patient will achieve a score of 25/55 on the Quick DASH.                                    STATUS:  Not met        Visit Diagnoses:    ICD-10-CM ICD-9-CM   1. Bilateral hand pain  M79.641 729.5    M79.642        Pt completed plan of care with no progress. Discharge from Occupational Therapy.          Timed:  Manual Therapy:    0     mins  32653;  Therapeutic Exercise:    10     mins  99155;     Therapeutic Activity:    10     mins  90579;  Ultrasound:     0     mins  23182;    Electrical Stimulation:    0     mins 92423;  Neuromuscular Hollis:    10    mins  70558;    Timed Treatment:   30   mins   Total Treatment:     30   min    Janeth Renteria  OTR/L  Occupational Therapist    Electronically signed   License number [id number]

## 2024-08-08 ENCOUNTER — OFFICE VISIT (OUTPATIENT)
Dept: CARDIOLOGY | Facility: CLINIC | Age: 74
End: 2024-08-08
Payer: MEDICARE

## 2024-08-08 ENCOUNTER — PREP FOR SURGERY (OUTPATIENT)
Dept: OTHER | Facility: HOSPITAL | Age: 74
End: 2024-08-08
Payer: MEDICARE

## 2024-08-08 VITALS
SYSTOLIC BLOOD PRESSURE: 130 MMHG | DIASTOLIC BLOOD PRESSURE: 85 MMHG | HEART RATE: 80 BPM | BODY MASS INDEX: 21.35 KG/M2 | HEIGHT: 62 IN | WEIGHT: 116 LBS

## 2024-08-08 DIAGNOSIS — I27.9 PULMONARY HEART DISEASE, UNSPECIFIED: ICD-10-CM

## 2024-08-08 DIAGNOSIS — I27.20 PULMONARY HYPERTENSION: Primary | ICD-10-CM

## 2024-08-08 DIAGNOSIS — R93.1 ABNORMAL FINDINGS ON DIAGNOSTIC IMAGING OF HEART AND CORONARY CIRCULATION: ICD-10-CM

## 2024-08-08 DIAGNOSIS — R60.0 EDEMA LEG: ICD-10-CM

## 2024-08-08 DIAGNOSIS — R93.1 ABNORMAL ECHOCARDIOGRAM: ICD-10-CM

## 2024-08-08 DIAGNOSIS — R06.09 DYSPNEA ON EXERTION: ICD-10-CM

## 2024-08-08 DIAGNOSIS — I10 ESSENTIAL HYPERTENSION: Primary | ICD-10-CM

## 2024-08-08 PROCEDURE — 3079F DIAST BP 80-89 MM HG: CPT | Performed by: INTERNAL MEDICINE

## 2024-08-08 PROCEDURE — 3075F SYST BP GE 130 - 139MM HG: CPT | Performed by: INTERNAL MEDICINE

## 2024-08-08 PROCEDURE — 99214 OFFICE O/P EST MOD 30 MIN: CPT | Performed by: INTERNAL MEDICINE

## 2024-08-08 RX ORDER — SODIUM CHLORIDE 0.9 % (FLUSH) 0.9 %
10 SYRINGE (ML) INJECTION EVERY 12 HOURS SCHEDULED
OUTPATIENT
Start: 2024-08-08

## 2024-08-08 RX ORDER — SODIUM CHLORIDE 0.9 % (FLUSH) 0.9 %
10 SYRINGE (ML) INJECTION AS NEEDED
OUTPATIENT
Start: 2024-08-08

## 2024-08-08 RX ORDER — SODIUM CHLORIDE 9 MG/ML
40 INJECTION, SOLUTION INTRAVENOUS AS NEEDED
OUTPATIENT
Start: 2024-08-08

## 2024-08-09 NOTE — H&P (VIEW-ONLY)
Chief Complaint  Hypertension and Fatigue (Follow Up)    Subjective        Fanta Hawkins presents to [facility] CARDIOLOGY  History of present illness:    Patient states she has been noting some dyspnea on exertion.  She also notes some swelling both in her feet and hands.  She states she has never been tested for sleep apnea but she does note a nurse when she was in the hospital told her that she probably had sleep apnea.      Past Medical History:   Diagnosis Date    Arthritis     Asthma     COPD (chronic obstructive pulmonary disease) 08/15/2022    Diabetes mellitus     Essential hypertension 2022    Injury of back     Mood disorder     S/P exploratory laparotomy with lysis of adhesions 2022    Sleep apnea          Past Surgical History:   Procedure Laterality Date    BACK SURGERY      COLONOSCOPY      EXPLORATORY LAPAROTOMY N/A 2012    repair of colon perforation after colonoscopy    EXPLORATORY LAPAROTOMY N/A 2022    Procedure: LAPAROTOMY EXPLORATORY;  Surgeon: Snehal Medina MD;  Location: [facility] OR [facility];  Service: General;  Laterality: N/A;    EYE SURGERY      HYSTERECTOMY            Social History     Socioeconomic History    Marital status:    Tobacco Use    Smoking status: Former     Current packs/day: 0.00     Average packs/day: 0.3 packs/day for 10.0 years (2.5 ttl pk-yrs)     Types: Cigarettes     Start date:      Quit date: 2022     Years since quittin.9     Passive exposure: Never    Smokeless tobacco: Never   Vaping Use    Vaping status: Never Used   Substance and Sexual Activity    Alcohol use: Never    Drug use: Never    Sexual activity: Defer         History reviewed. No pertinent family history.       Allergies   Allergen Reactions    Indomethacin Palpitations and Shortness Of Breath    Meloxicam Itching and Shortness Of Breath     Other reaction(s): soa, itching    Moxifloxacin Seizure     Other reaction(s): seizures    Morphine  "Hives and Itching     Other reaction(s): hives            Current Outpatient Medications:     albuterol sulfate  (90 Base) MCG/ACT inhaler, Inhale 2 puffs Every 4 (Four) Hours As Needed., Disp: , Rfl:     busPIRone (BUSPAR) 10 MG tablet, Take 1 tablet by mouth 2 (Two) Times a Day As Needed., Disp: , Rfl:     carvedilol (COREG) 6.25 MG tablet, Take 1 tablet by mouth 2 (Two) Times a Day With Meals., Disp: 180 tablet, Rfl: 6    Cholecalciferol (Vitamin D3) 50 MCG (2000 UT) capsule, Take 1 capsule by mouth Daily., Disp: , Rfl:     diclofenac (VOLTAREN) 50 MG EC tablet, Take 1 tablet by mouth Daily., Disp: , Rfl:     docusate sodium 100 MG capsule, Take 1 capsule by mouth 2 (Two) Times a Day As Needed., Disp: , Rfl:     fluticasone (FLONASE) 50 MCG/ACT nasal spray, 1 spray by Each Nare route Daily As Needed., Disp: , Rfl:     HYDROcodone-acetaminophen (NORCO) 5-325 MG per tablet, Take 1 tablet by mouth 3 (Three) Times a Day As Needed., Disp: , Rfl:     loratadine (CLARITIN) 10 MG tablet, Take 1 tablet by mouth Daily., Disp: , Rfl:     meclizine (ANTIVERT) 25 MG tablet, Take 1 tablet by mouth Daily As Needed for Dizziness., Disp: , Rfl:     omeprazole (prilOSEC) 10 MG capsule, Take 1 capsule by mouth Daily., Disp: , Rfl:     tiZANidine (ZANAFLEX) 4 MG tablet, Take 1 tablet by mouth Daily As Needed for Muscle Spasms., Disp: , Rfl:       ROS:  Cardiac review of systems is positive for dyspnea on exertion and edema    Objective     /85   Pulse 80   Ht 157.5 cm (62\")   Wt 52.6 kg (116 lb)   BMI 21.22 kg/m²       General Appearance:   well developed  well nourished  HENT:   oropharynx moist  lips not cyanotic  Respiratory:  no respiratory distress  normal breath sounds  no rales  Cardiovascular:  no jugular venous distention  regular rhythm  S1 normal, S2 normal  no S3, no S4   no murmur  no rub, no thrill  No carotid bruit  pedal pulses normal  lower extremity edema: none    Musculoskeletal:  no clubbing of " fingers.   normocephalic, head atraumatic  Skin:   warm, dry  Psychiatric:  judgement and insight appropriate  normal mood and affect    ECHO:  Results for orders placed in visit on 10/13/23    Adult Transthoracic Echo Complete W/ Cont if Necessary Per Protocol    Interpretation Summary  Normal left ventricular systolic function.  Trace aortic regurgitation.  Trace MR.  Moderate tricuspid regurgitation with moderate pulmonary hypertension.  Estimated pulmonary artery systolic pressure around 45 to 50 mm.    STRESS:  Results for orders placed during the hospital encounter of 10/20/23    Stress Test With Myocardial Perfusion One Day    Interpretation Summary  Patient received Lexiscan 0.4 mg IV fusion over 10 seconds.  Baseline ECG showed normal sinus rhythm.  At peak stress, 1 mm horizontal ST depression was noted in the inferolateral leads.  No significant arrhythmias were noted.  Myocardial perfusion images demonstrate a small area of mild perfusion defect in the distal anterior segment without reversibility on resting images, more likely related to attenuation artifact.  The left ventricle is normal in size with a calculated ejection fraction of 64%.  No wall motion abnormalities were noted.  No visual transient ischemic dilatation was noted.  Impressions are consistent with low risk myocardial perfusion study.    CATH:  No results found for this or any previous visit.    BMP:     Glucose   Date Value Ref Range Status   04/17/2023 99 65 - 99 mg/dL Final     BUN   Date Value Ref Range Status   04/17/2023 19 8 - 23 mg/dL Final     Creatinine   Date Value Ref Range Status   04/17/2023 0.66 0.57 - 1.00 mg/dL Final     Sodium   Date Value Ref Range Status   04/17/2023 139 136 - 145 mmol/L Final     Potassium   Date Value Ref Range Status   04/17/2023 4.4 3.5 - 5.2 mmol/L Final     Chloride   Date Value Ref Range Status   04/17/2023 101 98 - 107 mmol/L Final     CO2   Date Value Ref Range Status   04/17/2023 27.9 22.0 -  "29.0 mmol/L Final     Calcium   Date Value Ref Range Status   04/17/2023 9.7 8.6 - 10.5 mg/dL Final     BUN/Creatinine Ratio   Date Value Ref Range Status   04/17/2023 28.8 (H) 7.0 - 25.0 Final     Anion Gap   Date Value Ref Range Status   04/17/2023 10.1 5.0 - 15.0 mmol/L Final     eGFR   Date Value Ref Range Status   04/17/2023 94.5 >60.0 mL/min/1.73 Final     LIPIDS:  No results found for: \"CHOL\", \"TRIG\", \"HDL\", \"LDL\", \"VLDL\", \"LDLHDL\"      Procedures             ASSESSMENT:  Diagnoses and all orders for this visit:    1. Essential hypertension (Primary)    2. Abnormal echocardiogram    3. Dyspnea on exertion    4. Edema leg         PLAN:    1.  Patient had Cardiolite stress test 10/2023 that showed no evidence of ischemia and normal ejection fraction.  2.  Patient had echocardiogram 10/2023 that showed normal ejection fraction and trace aortic insufficiency.  She had moderate tricuspid regurgitation with RVSP estimated at greater than 50 and right atrial enlargement.  3.  I have asked the patient to get a right heart cath for evaluation of possible pulmonary hypertension.  She sounds like she has a short remote smoking history.  She was told by a nurse once when she was in the hospital that she felt she had obstructive sleep apnea although the patient has never been evaluated.  4.  Blood pressures under good control.  5.  Further recommendations after right heart cath and possible sending her to pulmonary for evaluation.      Return in about 2 months (around 10/8/2024) for asya gardner.     Patient was given instructions and counseling regarding her condition or for health maintenance advice. Please see specific information pulled into the AVS if appropriate.         Arnol Britt MD   8/9/2024  08:40 EDT  "

## 2024-08-09 NOTE — PROGRESS NOTES
Chief Complaint  Hypertension and Fatigue (Follow Up)    Subjective        Fanta Hawkins presents to [facility] CARDIOLOGY  History of present illness:    Patient states she has been noting some dyspnea on exertion.  She also notes some swelling both in her feet and hands.  She states she has never been tested for sleep apnea but she does note a nurse when she was in the hospital told her that she probably had sleep apnea.      Past Medical History:   Diagnosis Date    Arthritis     Asthma     COPD (chronic obstructive pulmonary disease) 08/15/2022    Diabetes mellitus     Essential hypertension 2022    Injury of back     Mood disorder     S/P exploratory laparotomy with lysis of adhesions 2022    Sleep apnea          Past Surgical History:   Procedure Laterality Date    BACK SURGERY      COLONOSCOPY      EXPLORATORY LAPAROTOMY N/A 2012    repair of colon perforation after colonoscopy    EXPLORATORY LAPAROTOMY N/A 2022    Procedure: LAPAROTOMY EXPLORATORY;  Surgeon: Snehal Medina MD;  Location: [facility] [place];  Service: General;  Laterality: N/A;    EYE SURGERY      HYSTERECTOMY            Social History     Socioeconomic History    Marital status:    Tobacco Use    Smoking status: Former     Current packs/day: 0.00     Average packs/day: 0.3 packs/day for 10.0 years (2.5 ttl pk-yrs)     Types: Cigarettes     Start date:      Quit date: 2022     Years since quittin.9     Passive exposure: Never    Smokeless tobacco: Never   Vaping Use    Vaping status: Never Used   Substance and Sexual Activity    Alcohol use: Never    Drug use: Never    Sexual activity: Defer         History reviewed. No pertinent family history.       Allergies   Allergen Reactions    Indomethacin Palpitations and Shortness Of Breath    Meloxicam Itching and Shortness Of Breath     Other reaction(s): soa, itching    Moxifloxacin Seizure     Other reaction(s): seizures    Morphine  "Hives and Itching     Other reaction(s): hives            Current Outpatient Medications:     albuterol sulfate  (90 Base) MCG/ACT inhaler, Inhale 2 puffs Every 4 (Four) Hours As Needed., Disp: , Rfl:     busPIRone (BUSPAR) 10 MG tablet, Take 1 tablet by mouth 2 (Two) Times a Day As Needed., Disp: , Rfl:     carvedilol (COREG) 6.25 MG tablet, Take 1 tablet by mouth 2 (Two) Times a Day With Meals., Disp: 180 tablet, Rfl: 6    Cholecalciferol (Vitamin D3) 50 MCG (2000 UT) capsule, Take 1 capsule by mouth Daily., Disp: , Rfl:     diclofenac (VOLTAREN) 50 MG EC tablet, Take 1 tablet by mouth Daily., Disp: , Rfl:     docusate sodium 100 MG capsule, Take 1 capsule by mouth 2 (Two) Times a Day As Needed., Disp: , Rfl:     fluticasone (FLONASE) 50 MCG/ACT nasal spray, 1 spray by Each Nare route Daily As Needed., Disp: , Rfl:     HYDROcodone-acetaminophen (NORCO) 5-325 MG per tablet, Take 1 tablet by mouth 3 (Three) Times a Day As Needed., Disp: , Rfl:     loratadine (CLARITIN) 10 MG tablet, Take 1 tablet by mouth Daily., Disp: , Rfl:     meclizine (ANTIVERT) 25 MG tablet, Take 1 tablet by mouth Daily As Needed for Dizziness., Disp: , Rfl:     omeprazole (prilOSEC) 10 MG capsule, Take 1 capsule by mouth Daily., Disp: , Rfl:     tiZANidine (ZANAFLEX) 4 MG tablet, Take 1 tablet by mouth Daily As Needed for Muscle Spasms., Disp: , Rfl:       ROS:  Cardiac review of systems is positive for dyspnea on exertion and edema    Objective     /85   Pulse 80   Ht 157.5 cm (62\")   Wt 52.6 kg (116 lb)   BMI 21.22 kg/m²       General Appearance:   well developed  well nourished  HENT:   oropharynx moist  lips not cyanotic  Respiratory:  no respiratory distress  normal breath sounds  no rales  Cardiovascular:  no jugular venous distention  regular rhythm  S1 normal, S2 normal  no S3, no S4   no murmur  no rub, no thrill  No carotid bruit  pedal pulses normal  lower extremity edema: none    Musculoskeletal:  no clubbing of " fingers.   normocephalic, head atraumatic  Skin:   warm, dry  Psychiatric:  judgement and insight appropriate  normal mood and affect    ECHO:  Results for orders placed in visit on 10/13/23    Adult Transthoracic Echo Complete W/ Cont if Necessary Per Protocol    Interpretation Summary  Normal left ventricular systolic function.  Trace aortic regurgitation.  Trace MR.  Moderate tricuspid regurgitation with moderate pulmonary hypertension.  Estimated pulmonary artery systolic pressure around 45 to 50 mm.    STRESS:  Results for orders placed during the hospital encounter of 10/20/23    Stress Test With Myocardial Perfusion One Day    Interpretation Summary  Patient received Lexiscan 0.4 mg IV fusion over 10 seconds.  Baseline ECG showed normal sinus rhythm.  At peak stress, 1 mm horizontal ST depression was noted in the inferolateral leads.  No significant arrhythmias were noted.  Myocardial perfusion images demonstrate a small area of mild perfusion defect in the distal anterior segment without reversibility on resting images, more likely related to attenuation artifact.  The left ventricle is normal in size with a calculated ejection fraction of 64%.  No wall motion abnormalities were noted.  No visual transient ischemic dilatation was noted.  Impressions are consistent with low risk myocardial perfusion study.    CATH:  No results found for this or any previous visit.    BMP:     Glucose   Date Value Ref Range Status   04/17/2023 99 65 - 99 mg/dL Final     BUN   Date Value Ref Range Status   04/17/2023 19 8 - 23 mg/dL Final     Creatinine   Date Value Ref Range Status   04/17/2023 0.66 0.57 - 1.00 mg/dL Final     Sodium   Date Value Ref Range Status   04/17/2023 139 136 - 145 mmol/L Final     Potassium   Date Value Ref Range Status   04/17/2023 4.4 3.5 - 5.2 mmol/L Final     Chloride   Date Value Ref Range Status   04/17/2023 101 98 - 107 mmol/L Final     CO2   Date Value Ref Range Status   04/17/2023 27.9 22.0 -  "29.0 mmol/L Final     Calcium   Date Value Ref Range Status   04/17/2023 9.7 8.6 - 10.5 mg/dL Final     BUN/Creatinine Ratio   Date Value Ref Range Status   04/17/2023 28.8 (H) 7.0 - 25.0 Final     Anion Gap   Date Value Ref Range Status   04/17/2023 10.1 5.0 - 15.0 mmol/L Final     eGFR   Date Value Ref Range Status   04/17/2023 94.5 >60.0 mL/min/1.73 Final     LIPIDS:  No results found for: \"CHOL\", \"TRIG\", \"HDL\", \"LDL\", \"VLDL\", \"LDLHDL\"      Procedures             ASSESSMENT:  Diagnoses and all orders for this visit:    1. Essential hypertension (Primary)    2. Abnormal echocardiogram    3. Dyspnea on exertion    4. Edema leg         PLAN:    1.  Patient had Cardiolite stress test 10/2023 that showed no evidence of ischemia and normal ejection fraction.  2.  Patient had echocardiogram 10/2023 that showed normal ejection fraction and trace aortic insufficiency.  She had moderate tricuspid regurgitation with RVSP estimated at greater than 50 and right atrial enlargement.  3.  I have asked the patient to get a right heart cath for evaluation of possible pulmonary hypertension.  She sounds like she has a short remote smoking history.  She was told by a nurse once when she was in the hospital that she felt she had obstructive sleep apnea although the patient has never been evaluated.  4.  Blood pressures under good control.  5.  Further recommendations after right heart cath and possible sending her to pulmonary for evaluation.      Return in about 2 months (around 10/8/2024) for asya gardner.     Patient was given instructions and counseling regarding her condition or for health maintenance advice. Please see specific information pulled into the AVS if appropriate.         Arnol Britt MD   8/9/2024  08:40 EDT  "

## 2024-08-12 PROBLEM — I27.20 PULMONARY HYPERTENSION: Status: ACTIVE | Noted: 2024-08-08

## 2024-08-13 ENCOUNTER — TELEPHONE (OUTPATIENT)
Dept: CARDIOLOGY | Facility: CLINIC | Age: 74
End: 2024-08-13
Payer: MEDICARE

## 2024-08-13 DIAGNOSIS — R06.09 DYSPNEA ON EXERTION: ICD-10-CM

## 2024-08-13 DIAGNOSIS — R60.0 EDEMA LEG: ICD-10-CM

## 2024-08-13 DIAGNOSIS — I27.20 PULMONARY HYPERTENSION: Primary | ICD-10-CM

## 2024-08-13 DIAGNOSIS — R06.02 SHORTNESS OF BREATH: ICD-10-CM

## 2024-08-13 DIAGNOSIS — R93.1 ABNORMAL ECHOCARDIOGRAM: ICD-10-CM

## 2024-08-13 NOTE — TELEPHONE ENCOUNTER
I spoke to patient and gave an arrival time of 7:00 on 08/21/24 for [facility]. Patient was instructed to have a  for the day of the procedure and to arrive at the [facility] registration area. Patient was educated about stent placement and informed that in the event of stent placement, the patient will be required to stay overnight for observation. Patient was instructed to continue all medications as usual. Patient was instructed to be NPO after midnight with only sips of water as needed. Patient was instructed to have labs completed on the morning of the procedure. Patient is agreeable with no other questions or concerns.

## 2024-08-19 NOTE — PRE-PROCEDURE INSTRUCTIONS
PATIENT INSTRUCTED TO BE:    - NPO AFTER MIDNIGHT EXCEPT CAN HAVE SIPS OF WATER WITH HIS MEDICATION PRIOR TO PROCEDURE    -  INSTRUCTED NO LOTIONS, JEWELRY, PIERCINGS, OR DEODORANT DAY OF THE PROCEDURE    - INSTRUCTED TO TAKE THE FOLLOWING MEDICATIONS THE DAY OF SURGERY:       Diclofenac, Norco, Buspirone, Claritin, Coreg, Docusate, Prilosec    - INSTRUCTED PT TO FOLLOW ANY INSTRUCTIONS GIVEN BY HIS CARDIOLOGIST.    - INFORMED PT THEY ATTEMPT RADIAL APPROACH FIRST IF UNABLE TO PERFORM CATH WITH THAT APPROACH THEY WILL DO A FEMORAL APPROACH.  ALSO, INFORMED PT THEY WILL BE ON BEDREST POSTOP.  IF THE SURGEON FEELS  AN INTERVENTION OR STENTS NEEDS TO BE PLACED, HE/SHE WILL STAY OVER NIGHT FOR OBSERVATION AND MONITORING.     - INFORMED THE PATIENT HE CAN HAVE TWO VISITORS WITH HIM THE DAY OF THE PROCEDURE    - INSTRUCTED PT TO PARK ON THE NORTH SIDE OF THE HOSPITAL IN THE OPEN PARKING LOT, ENTER THE HOSPITAL THRU ENTRANCE C/ [address] AND  CHECK IN AT THE REGISTRATION DESK, AFTER REGISTRATION PT WILL BE TAKEN THE THE PREOP AREA FOR HIS OR HER PROCEDURE.    -ARRIVAL TIME GIVEN BY CARDIOLOGIST OFFICE, INFORMED PT IF ARRIVAL TIME CHANGES OR ADJUSTMENTS NEED TO BE MADE IN THEIR ARRIVAL TIME, HE/SHE WOULD RECEIVE A CALL.    -INSTRUCTED PT TO COME TO [facility] TO GET THEIR LABS/ EKG DONE PRIOR TO PROCEDURE      - PATIENT VERBALIZED UNDERSTANDING    
60

## 2024-08-21 ENCOUNTER — HOSPITAL ENCOUNTER (OUTPATIENT)
Facility: HOSPITAL | Age: 74
Setting detail: HOSPITAL OUTPATIENT SURGERY
Discharge: HOME | End: 2024-08-21
Attending: INTERNAL MEDICINE
Payer: MEDICARE

## 2024-08-21 VITALS
HEIGHT: 62 IN | DIASTOLIC BLOOD PRESSURE: 88 MMHG | TEMPERATURE: 97.5 F | SYSTOLIC BLOOD PRESSURE: 137 MMHG | HEART RATE: 77 BPM | WEIGHT: 114.42 LBS | RESPIRATION RATE: 18 BRPM | BODY MASS INDEX: 21.06 KG/M2 | OXYGEN SATURATION: 97 %

## 2024-08-21 DIAGNOSIS — R06.02 SHORTNESS OF BREATH: ICD-10-CM

## 2024-08-21 DIAGNOSIS — I27.20 PULMONARY HYPERTENSION: ICD-10-CM

## 2024-08-21 DIAGNOSIS — I27.9 PULMONARY HEART DISEASE, UNSPECIFIED: ICD-10-CM

## 2024-08-21 DIAGNOSIS — R93.1 ABNORMAL FINDINGS ON DIAGNOSTIC IMAGING OF HEART AND CORONARY CIRCULATION: ICD-10-CM

## 2024-08-21 LAB
ANION GAP SERPL CALCULATED.3IONS-SCNC: 6.9 MMOL/L (ref 5–15)
BASE DEFICIT: ABNORMAL
BASE EXCESS BLDV CALC-SCNC: 2 MMOL/L (ref -2–2)
BUN SERPL-MCNC: 22 MG/DL (ref 8–23)
BUN/CREAT SERPL: 27.5 (ref 7–25)
CA-I BLDA-SCNC: 1.23 MMOL/L (ref 4.5–5.3)
CALCIUM SPEC-SCNC: 9 MG/DL (ref 8.6–10.5)
CHLORIDE SERPL-SCNC: 104 MMOL/L (ref 98–107)
CO2 CONTENT VENOUS: 30 MMOL/L
CO2 SERPL-SCNC: 28.1 MMOL/L (ref 22–29)
CREAT SERPL-MCNC: 0.8 MG/DL (ref 0.57–1)
DEPRECATED RDW RBC AUTO: 45.6 FL (ref 37–54)
EGFRCR SERPLBLD CKD-EPI 2021: 78.9 ML/MIN/1.73
ERYTHROCYTE [DISTWIDTH] IN BLOOD BY AUTOMATED COUNT: 12.5 % (ref 12.3–15.4)
GLUCOSE BLDC GLUCOMTR-MCNC: 96 MG/DL (ref 70–130)
GLUCOSE SERPL-MCNC: 95 MG/DL (ref 65–99)
HCO3 BLDV-SCNC: 28.1 MMOL/L (ref 22–26)
HCT VFR BLD AUTO: 33.9 % (ref 34–46.6)
HCT VFR BLDA CALC: 30 % (ref 38–51)
HGB BLD-MCNC: 11 G/DL (ref 12–15.9)
HGB BLDA-MCNC: 10.2 G/DL (ref 12–17)
INR PPP: 0.97 (ref 0.86–1.15)
MCH RBC QN AUTO: 31.8 PG (ref 26.6–33)
MCHC RBC AUTO-ENTMCNC: 32.4 G/DL (ref 31.5–35.7)
MCV RBC AUTO: 98 FL (ref 79–97)
PCO2 BLDV: 51.3 MM HG (ref 41–51)
PH BLDV: 7.35 PH UNITS (ref 7.31–7.41)
PLATELET # BLD AUTO: 247 10*3/MM3 (ref 140–450)
PMV BLD AUTO: 9.5 FL (ref 6–12)
PO2 BLDV: 41 MM HG (ref 35–42)
POTASSIUM BLDA-SCNC: 3.8 MMOL/L (ref 3.5–4.9)
POTASSIUM SERPL-SCNC: 3.8 MMOL/L (ref 3.5–5.2)
PROTHROMBIN TIME: 13.1 SECONDS (ref 11.8–14.9)
RBC # BLD AUTO: 3.46 10*6/MM3 (ref 3.77–5.28)
SAO2 % BLDCOV: 73 % (ref 45–75)
SODIUM BLD-SCNC: 142 MMOL/L (ref 138–146)
SODIUM SERPL-SCNC: 139 MMOL/L (ref 136–145)
WBC NRBC COR # BLD AUTO: 4.32 10*3/MM3 (ref 3.4–10.8)

## 2024-08-21 PROCEDURE — 85027 COMPLETE CBC AUTOMATED: CPT

## 2024-08-21 PROCEDURE — 80048 BASIC METABOLIC PNL TOTAL CA: CPT

## 2024-08-21 PROCEDURE — 84295 ASSAY OF SERUM SODIUM: CPT

## 2024-08-21 PROCEDURE — 84132 ASSAY OF SERUM POTASSIUM: CPT

## 2024-08-21 PROCEDURE — 85014 HEMATOCRIT: CPT

## 2024-08-21 PROCEDURE — 82330 ASSAY OF CALCIUM: CPT

## 2024-08-21 PROCEDURE — 99153 MOD SED SAME PHYS/QHP EA: CPT | Performed by: INTERNAL MEDICINE

## 2024-08-21 PROCEDURE — 25010000002 MIDAZOLAM PER 1MG: Performed by: INTERNAL MEDICINE

## 2024-08-21 PROCEDURE — 85610 PROTHROMBIN TIME: CPT

## 2024-08-21 PROCEDURE — 99152 MOD SED SAME PHYS/QHP 5/>YRS: CPT | Performed by: INTERNAL MEDICINE

## 2024-08-21 PROCEDURE — 93451 RIGHT HEART CATH: CPT | Performed by: INTERNAL MEDICINE

## 2024-08-21 PROCEDURE — 82947 ASSAY GLUCOSE BLOOD QUANT: CPT

## 2024-08-21 PROCEDURE — C1894 INTRO/SHEATH, NON-LASER: HCPCS | Performed by: INTERNAL MEDICINE

## 2024-08-21 PROCEDURE — 82803 BLOOD GASES ANY COMBINATION: CPT

## 2024-08-21 PROCEDURE — 25010000002 MEPERIDINE PER 100 MG: Performed by: INTERNAL MEDICINE

## 2024-08-21 RX ORDER — LIDOCAINE HYDROCHLORIDE 20 MG/ML
INJECTION, SOLUTION INFILTRATION; PERINEURAL
Status: DISCONTINUED | OUTPATIENT
Start: 2024-08-21 | End: 2024-08-21 | Stop reason: HOSPADM

## 2024-08-21 RX ORDER — SODIUM CHLORIDE 0.9 % (FLUSH) 0.9 %
10 SYRINGE (ML) INJECTION EVERY 12 HOURS SCHEDULED
Status: DISCONTINUED | OUTPATIENT
Start: 2024-08-21 | End: 2024-08-21 | Stop reason: HOSPADM

## 2024-08-21 RX ORDER — SODIUM CHLORIDE 0.9 % (FLUSH) 0.9 %
10 SYRINGE (ML) INJECTION AS NEEDED
Status: DISCONTINUED | OUTPATIENT
Start: 2024-08-21 | End: 2024-08-21 | Stop reason: HOSPADM

## 2024-08-21 RX ORDER — MEPERIDINE HYDROCHLORIDE 25 MG/ML
INJECTION INTRAMUSCULAR; INTRAVENOUS; SUBCUTANEOUS
Status: DISCONTINUED | OUTPATIENT
Start: 2024-08-21 | End: 2024-08-21 | Stop reason: HOSPADM

## 2024-08-21 RX ORDER — SODIUM CHLORIDE 9 MG/ML
40 INJECTION, SOLUTION INTRAVENOUS AS NEEDED
Status: DISCONTINUED | OUTPATIENT
Start: 2024-08-21 | End: 2024-08-21 | Stop reason: HOSPADM

## 2024-08-21 RX ORDER — MIDAZOLAM HYDROCHLORIDE 2 MG/2ML
INJECTION, SOLUTION INTRAMUSCULAR; INTRAVENOUS
Status: DISCONTINUED | OUTPATIENT
Start: 2024-08-21 | End: 2024-08-21 | Stop reason: HOSPADM

## 2024-08-21 NOTE — DISCHARGE INSTRUCTIONS
DISCHARGE INSTRUCTIONS  VASCULAR  PROCEDURES      For your surgery you had:  IV sedation.     You may experience dizziness, drowsiness, or light-headedness for several hours following surgery/procedure.  Do not stay alone today or tonight.  Limit your activity for 24 hours.  Resume your diet slowly.  Follow whatever special dietary instructions you may have been given by your doctor.  You should not drive or operate machinery, drink alcohol, or sign legally binding documents for 24 hours or while you are taking pain medication.    NOTIFY YOUR DOCTOR IF YOU EXPERIENCE ANY OF THE FOLLOWING:  Temperature greater than 101 degrees Fahrenheit  Shaking Chills  Redness or excessive drainage from incision  Nausea, vomiting and/or pain that is not controlled by prescribed medications  Increase in bleeding or bleeding that is excessive  Unable to urinate in 6 hours after surgery  If unable to reach your doctor, please go to the closest Emergency Room  May remove dressing: in 24 hours  No heavy lifting greater than 10-15lbs for 3 days.  You may shower tomorrow, no submerging of incision for 2 weeks. (Pat site dry only)      Medications per physician instructions as indicated on After Visit Summary.          SPECIAL INSTRUCTIONS:

## 2024-08-22 LAB
BASE DEFICIT: ABNORMAL
BASE DEFICIT: ABNORMAL
BASE EXCESS BLDV CALC-SCNC: 0 MMOL/L (ref -2–2)
BASE EXCESS BLDV CALC-SCNC: 4 MMOL/L (ref -2–2)
CA-I BLDA-SCNC: 1.01 MMOL/L (ref 4.5–5.3)
CA-I BLDA-SCNC: 1.2 MMOL/L (ref 4.5–5.3)
CO2 CONTENT VENOUS: 27 MMOL/L
CO2 CONTENT VENOUS: 30 MMOL/L
GLUCOSE BLDC GLUCOMTR-MCNC: 90 MG/DL (ref 70–130)
GLUCOSE BLDC GLUCOMTR-MCNC: 94 MG/DL (ref 70–130)
HCO3 BLDV-SCNC: 25.8 MMOL/L (ref 22–26)
HCO3 BLDV-SCNC: 28.9 MMOL/L (ref 22–26)
HCT VFR BLDA CALC: 27 % (ref 38–51)
HCT VFR BLDA CALC: 31 % (ref 38–51)
HGB BLDA-MCNC: 10.5 G/DL (ref 12–17)
HGB BLDA-MCNC: 9.2 G/DL (ref 12–17)
PCO2 BLDV: 47.6 MM HG (ref 41–51)
PCO2 BLDV: 50 MM HG (ref 41–51)
PH BLDV: 7.34 PH UNITS (ref 7.31–7.41)
PH BLDV: 7.37 PH UNITS (ref 7.31–7.41)
PO2 BLDV: 43 MM HG (ref 35–42)
PO2 BLDV: 43 MM HG (ref 35–42)
POTASSIUM BLDA-SCNC: 3.4 MMOL/L (ref 3.5–4.9)
POTASSIUM BLDA-SCNC: 3.8 MMOL/L (ref 3.5–4.9)
SAO2 % BLDCOV: 75 % (ref 45–75)
SAO2 % BLDCOV: 76 % (ref 45–75)
SODIUM BLD-SCNC: 139 MMOL/L (ref 138–146)
SODIUM BLD-SCNC: 141 MMOL/L (ref 138–146)

## 2024-09-01 NOTE — PLAN OF CARE
Goal Outcome Evaluation:  Plan of Care Reviewed With: patient        Progress: no change  Outcome Evaluation: Pt remained A&Ox4 this shift. Also, remained on room air maintaining stable oxygen saturation. NG tube was removed as per order this shift. Pt tolerated well. Pt was medicated with PRN Norco 10 and Dilaudid this shift to help achieve an acceptable pain tolerance level. All other vital signs remained stable.   No significant past surgical history Clear

## 2024-09-02 PROCEDURE — 87186 SC STD MICRODIL/AGAR DIL: CPT | Performed by: NURSE PRACTITIONER

## 2024-09-02 PROCEDURE — 87086 URINE CULTURE/COLONY COUNT: CPT | Performed by: NURSE PRACTITIONER

## 2024-09-02 PROCEDURE — 87077 CULTURE AEROBIC IDENTIFY: CPT | Performed by: NURSE PRACTITIONER

## 2024-09-04 ENCOUNTER — TELEPHONE (OUTPATIENT)
Dept: URGENT CARE | Facility: CLINIC | Age: 74
End: 2024-09-04
Payer: MEDICARE

## 2024-09-04 NOTE — TELEPHONE ENCOUNTER
----- Message from Des [place]ah sent at 9/4/2024  9:35 AM EDT -----  I spoke to the patient.  Her urine grew out Klebsiella.  I sent over Keflex for her.  According to the culture it should work.  If she does not show improvement couple days or symptoms do not fernando after in treatment follow-up with primary

## 2024-10-08 ENCOUNTER — OFFICE VISIT (OUTPATIENT)
Dept: CARDIOLOGY | Facility: CLINIC | Age: 74
End: 2024-10-08
Payer: MEDICARE

## 2024-10-08 VITALS
WEIGHT: 115 LBS | SYSTOLIC BLOOD PRESSURE: 143 MMHG | DIASTOLIC BLOOD PRESSURE: 91 MMHG | BODY MASS INDEX: 21.16 KG/M2 | HEART RATE: 75 BPM | HEIGHT: 62 IN

## 2024-10-08 DIAGNOSIS — R93.1 ABNORMAL ECHOCARDIOGRAM: ICD-10-CM

## 2024-10-08 DIAGNOSIS — I10 ESSENTIAL HYPERTENSION: ICD-10-CM

## 2024-10-08 DIAGNOSIS — R06.09 DYSPNEA ON EXERTION: Primary | ICD-10-CM

## 2024-10-08 PROCEDURE — 3079F DIAST BP 80-89 MM HG: CPT

## 2024-10-08 PROCEDURE — 1159F MED LIST DOCD IN RCRD: CPT

## 2024-10-08 PROCEDURE — 99214 OFFICE O/P EST MOD 30 MIN: CPT

## 2024-10-08 PROCEDURE — 3077F SYST BP >= 140 MM HG: CPT

## 2024-10-08 PROCEDURE — 1160F RVW MEDS BY RX/DR IN RCRD: CPT

## 2024-10-08 NOTE — PROGRESS NOTES
Chief Complaint  2 month follow up , Essential hypertension, and Chest Pain    Subjective        History of Present Illness  Fanta Hawkins presents to [facility] CARDIOLOGY for follow up.   Patient is a 73-year-old female with past medical history outlined below, significant for hypertension who presents for follow-up on recent right heart catheterization done for shortness of breath that demonstrated normal right heart filling pressures, normal pulmonary capillary wedge pressure.  Patient continues to note shortness of breath and dyspnea on exertion.  She denies chest pain or discomfort.  She has no dizziness, lightheadedness, palpitations, edema or syncope.    Past Medical History:   Diagnosis Date    Anxiety     Arthritis     Asthma     Chest pain     COPD (chronic obstructive pulmonary disease) 08/15/2022    Essential hypertension 08/24/2022    Injury of back     Mood disorder     S/P exploratory laparotomy with lysis of adhesions 08/22/2022       ALLERGY  Allergies   Allergen Reactions    Indomethacin Palpitations and Shortness Of Breath    Meloxicam Itching and Shortness Of Breath     Other reaction(s): soa, itching    Moxifloxacin Seizure     Other reaction(s): seizures    Morphine Hives and Itching     Other reaction(s): hives        Past Surgical History:   Procedure Laterality Date    BACK SURGERY      rods and pins    BLADDER SURGERY      CARDIAC CATHETERIZATION N/A 8/21/2024    Procedure: Right Heart Cath;  Surgeon: Arnol Britt MD;  Location: [facility] CATH INVASIVE LOCATION;  Service: Cardiology;  Laterality: N/A;    COLONOSCOPY      EXPLORATORY LAPAROTOMY N/A 03/19/2012    repair of colon perforation after colonoscopy    EXPLORATORY LAPAROTOMY N/A 08/19/2022    Procedure: LAPAROTOMY EXPLORATORY;  Surgeon: Snehal Medina MD;  Location: [facility] OR [facility] [place] [facility];  Service: General;  Laterality: N/A;    EYE SURGERY      HYSTERECTOMY          Social History     Socioeconomic History     Marital status:    Tobacco Use    Smoking status: Former     Current packs/day: 0.00     Average packs/day: 0.3 packs/day for 10.0 years (2.5 ttl pk-yrs)     Types: Cigarettes     Start date:      Quit date: 2022     Years since quittin.1     Passive exposure: Never    Smokeless tobacco: Never   Vaping Use    Vaping status: Never Used   Substance and Sexual Activity    Alcohol use: Never    Drug use: Never    Sexual activity: Defer       Family History   Problem Relation Age of Onset    Malig Hyperthermia Neg Hx         Current Outpatient Medications on File Prior to Visit   Medication Sig    albuterol sulfate  (90 Base) MCG/ACT inhaler Inhale 2 puffs Every 4 (Four) Hours As Needed.    atorvastatin (LIPITOR) 10 MG tablet Take 1 tablet by mouth Daily.    busPIRone (BUSPAR) 10 MG tablet Take 1 tablet by mouth 2 (Two) Times a Day As Needed.    carvedilol (COREG) 6.25 MG tablet Take 1 tablet by mouth 2 (Two) Times a Day With Meals.    Cholecalciferol (Vitamin D3) 50 MCG (2000 UT) capsule Take 1 capsule by mouth Daily.    diclofenac (VOLTAREN) 50 MG EC tablet Take 1 tablet by mouth Daily.    docusate sodium 100 MG capsule Take 1 capsule by mouth 2 (Two) Times a Day As Needed.    fluticasone (FLONASE) 50 MCG/ACT nasal spray 1 spray by Each Nare route Daily As Needed.    HYDROcodone-acetaminophen (NORCO) 5-325 MG per tablet Take 1 tablet by mouth 3 (Three) Times a Day As Needed.    loratadine (CLARITIN) 10 MG tablet Take 1 tablet by mouth Daily.    meclizine (ANTIVERT) 25 MG tablet Take 1 tablet by mouth Daily As Needed for Dizziness.    omeprazole (prilOSEC) 10 MG capsule Take 1 capsule by mouth Daily.    tiZANidine (ZANAFLEX) 4 MG tablet Take 1 tablet by mouth Daily As Needed for Muscle Spasms.     No current facility-administered medications on file prior to visit.       Objective   Vitals:    10/08/24 0933 10/08/24 0935   BP: 155/88 143/91   Pulse: 78 75   Weight: 52.2 kg (115 lb)   "  Height: 157.5 cm (62\")        Physical Exam  Constitutional:       General: She is awake. She is not in acute distress.     Appearance: Normal appearance.   HENT:      Head: Normocephalic.      Nose: Nose normal. No congestion.   Eyes:      Extraocular Movements: Extraocular movements intact.      Conjunctiva/sclera: Conjunctivae normal.      Pupils: Pupils are equal, round, and reactive to light.   Neck:      Thyroid: No thyromegaly.      Vascular: No JVD.   Cardiovascular:      Rate and Rhythm: Normal rate and regular rhythm.      Chest Wall: PMI is not displaced.      Pulses: Normal pulses.      Heart sounds: Normal heart sounds, S1 normal and S2 normal. No murmur heard.     No friction rub. No gallop. No S3 or S4 sounds.   Pulmonary:      Effort: Pulmonary effort is normal.      Breath sounds: Normal breath sounds. No wheezing, rhonchi or rales.   Abdominal:      General: Bowel sounds are normal.      Palpations: Abdomen is soft.      Tenderness: There is no abdominal tenderness.   Musculoskeletal:      Cervical back: No tenderness.      Right lower leg: No edema.      Left lower leg: No edema.   Lymphadenopathy:      Cervical: No cervical adenopathy.   Skin:     General: Skin is warm and dry.      Capillary Refill: Capillary refill takes less than 2 seconds.      Coloration: Skin is not cyanotic.      Findings: No petechiae or rash.      Nails: There is no clubbing.   Neurological:      Mental Status: She is alert.   Psychiatric:         Mood and Affect: Mood normal.         Behavior: Behavior is cooperative.           Result Review     The following data was reviewed by JIN Silverman on 10/08/24.      CMP          8/21/2024    08:09   CMP   Glucose 95    BUN 22    Creatinine 0.80    EGFR 78.9    Sodium 139    Potassium 3.8    Chloride 104    Calcium 9.0    BUN/Creatinine Ratio 27.5    Anion Gap 6.9      CBC w/diff          8/21/2024    08:09 8/21/2024    13:09 8/21/2024    13:16 8/21/2024    13:20 "   CBC w/Diff   WBC 4.32       RBC 3.46       Hemoglobin 11.0  10.5  10.2  9.2    Hematocrit 33.9  31  30  27    MCV 98.0       MCH 31.8       MCHC 32.4       RDW 12.5       Platelets 247              Results for orders placed in visit on 10/13/23    Adult Transthoracic Echo Complete W/ Cont if Necessary Per Protocol    Interpretation Summary  Normal left ventricular systolic function.  Trace aortic regurgitation.  Trace MR.  Moderate tricuspid regurgitation with moderate pulmonary hypertension.  Estimated pulmonary artery systolic pressure around 45 to 50 mm.    Results for orders placed during the hospital encounter of 10/20/23    Stress Test With Myocardial Perfusion One Day    Interpretation Summary  Patient received Lexiscan 0.4 mg IV fusion over 10 seconds.  Baseline ECG showed normal sinus rhythm.  At peak stress, 1 mm horizontal ST depression was noted in the inferolateral leads.  No significant arrhythmias were noted.  Myocardial perfusion images demonstrate a small area of mild perfusion defect in the distal anterior segment without reversibility on resting images, more likely related to attenuation artifact.  The left ventricle is normal in size with a calculated ejection fraction of 64%.  No wall motion abnormalities were noted.  No visual transient ischemic dilatation was noted.  Impressions are consistent with low risk myocardial perfusion study.    No results found for this or any previous visit.          Procedures    Assessment & Plan  Diagnoses and all orders for this visit:    1. Dyspnea on exertion (Primary)  -     Ambulatory Referral to Pulmonology    2. Essential hypertension    3. Abnormal echocardiogram    1.  Recent right heart catheterization was normal.  Patient was encouraged to follow-up with pulmonology for continued shortness of breath.  Reassured her I do not feel this is related to her heart.  2.  Blood pressure is mildly elevated but patient reports normal blood pressure readings at  home.  Continue current medications.  3.  Status post recent right heart catheterization.  Will continue to monitor clinically.    Follow Up   Return in about 6 months (around 4/8/2025) for With Dr. Britt.    Patient was given instructions and counseling regarding her condition or for health maintenance advice. Please see specific information pulled into the AVS if appropriate.     Inge Ivey, JIN  10/08/24  10:26 EDT    Dictated Utilizing Dragon Dictation

## 2024-10-29 NOTE — PLAN OF CARE
Diagnosis: [x] TONY  (G47.33) [] CSA (G47.31) []  Other:__________________   Length of Need: [] 13 months [x]  99 Months                                          []  Other:__________________   Machine (MAREN): [] Respironics Auto (with modem for remote monitoring)       [] Other:____________________    []  ResMed Auto (with modem for remote monitoring)    []  CPAP () [] Bilevel ()   Mode: Mode:   [] Auto [] Fixed [] Auto [] Spontaneous   Pmin:_________cmH2O      Pmax:_________cmH2O   P:_________cmH2O    EPAPmin:__________cmH2O IPAP:__________cmH2O     IPAPmax:__________cmH2O EPAP:__________cmH2O     PSmin:_______  PSmax:_______       (ResMed) PS:_________     Flex/EPR - 3 full time                          Ramp time: 30 min Flex/EPR - 3 full time                 Ramp time: 30 min   Ramp Pressure:___________cmH2O Ramp Pressure:____________cmH2O         Humidifier: [] Heated ()                                               [] Passive     [] Water chamber replacement ()/ 1 per 6 months   Mask:   [] Nasal () /1 per 3 months [x] Full Face () /1 per 3 months   [] Patient choice -Size and fit mask [x] Patient Choice - Size and fit mask   [] Dispense:  [x] Dispense: F&P Senthil full face mask   [] Headgear () / 1 per 3 months [x] Headgear () / 1 per 3 months   [] Replacement Nasal Cushion ()/2 per month [x] Interface Replacement ()/1 per month   [] Replacement Nasal Pillows ()/2 per month       Tubing: [] Heated ()/1 per 3 months                           [] Standard ()/1 per 3 months  [] Other:____________________   Filters: [] Non-disposable ()/1 per 6 months                 [] Ultra-Fine, Disposable ()/2 per month     Miscellaneous: [] Chin Strap ()/ 1 per 6months      [] Other:__________________________________         Start Order Date: 10/29/24    MEDICAL JUSTIFICATION:  I, the undersigned, certify that the above prescribed supplies are  Goal Outcome Evaluation:  Plan of Care Reviewed With: patient        Progress: declining  Outcome Evaluation: patient is alert and oriented this shift, medicated with prn medication for pain and nausea. NG tube inserted and connected to low wall suction. started on a NPO diet with ice chips and sips with meds. encouraged activity. vss, no other changes at this time

## 2024-11-08 NOTE — PROGRESS NOTES
Primary Care Provider  Eduard Minaya MD   Referring Provider  Inge Ivey*    Patient Complaint  Shortness of Breath, Establish Care, and COPD      Subjective       History of Presenting Illness  Fanta Hawkins is a pleasant 73 y.o. female who presents to [facility] for new patient appointment.  Patient was referred by cardiology for shortness of air on exertion.    Patient underwent right heart cath 8/21/2024 which revealed normal right heart filling pressures, normal pulmonary capillary wedge pressure.  Patient is currently on albuterol inhaler for shortness of air or wheeze but states she might use it once a day if even that often.  She is not on any maintenance inhalers. Patient is a former smoker quit in 2022.  Patient states she started in 2013 but only smoked for about 9 years with a 2.5 pack year history.  Patient states she did grow up in a secondhand environment as well as a spouse who smoked.  She worked in housekeeping.  She has no pets in the home.  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  States the lady she sits with does have a bird and she does take care of the bird.  She does not have a regular cough but states when she does cough she has clear mucus. Patient states her shortness of air with exertion of moderate severity but improves with rest.  She is not on any oxygen or durable medical equipment.  She is up-to-date with her vaccines.  Patient denies coughing, wheezing, headaches, chest pain, weight loss or hemoptysis. Patient denies fevers, chills and night sweats. Fanta Hawkins is able to perform ADLs without difficulties.Chest x-ray 9/2/2024 revealed no acute cardiopulmonary abnormality with lungs adequately expanded and clear.  Patient has not had a PFT or CT.    I have personally reviewed the review of systems, past family, social, medical and surgical histories; and agree with their  findings.      Review of Systems   Constitutional: Negative.    HENT: Negative.     Respiratory:  Positive for shortness of breath.    Cardiovascular: Negative.    Musculoskeletal: Negative.    Neurological: Negative.    Psychiatric/Behavioral: Negative.           Family History   Problem Relation Age of Onset    Emphysema Mother     COPD Mother     Malig Hyperthermia Neg Hx         Social History     Socioeconomic History    Marital status:    Tobacco Use    Smoking status: Former     Current packs/day: 0.00     Average packs/day: 0.3 packs/day for 10.0 years (2.5 ttl pk-yrs)     Types: Cigarettes     Start date:      Quit date: 2022     Years since quittin.1     Passive exposure: Never    Smokeless tobacco: Never   Vaping Use    Vaping status: Never Used   Substance and Sexual Activity    Alcohol use: Never    Drug use: Never    Sexual activity: Defer        Past Medical History:   Diagnosis Date    Anxiety     Arthritis     Asthma     Chest pain     COPD (chronic obstructive pulmonary disease) 08/15/2022    Essential hypertension 2022    Injury of back     Mood disorder     S/P exploratory laparotomy with lysis of adhesions 2022        Immunization History   Administered Date(s) Administered    31-influenza Vac Quardvalent Preservativ 10/14/2019, 10/08/2021, 10/10/2023, 10/02/2024    COVID-19 (PFIZER) Purple Cap Monovalent 2021, 2021, 2021    Fluzone  >6mos 10/29/2012, 10/28/2013, 10/30/2014, 10/26/2016, 10/05/2017, 2018, 10/14/2019, 2022    Influenza Injectable Mdck Pf Quad 10/05/2017, 2018, 2022    Pneumococcal Conjugate 13-Valent (PCV13) 2020    Pneumococcal Polysaccharide (PPSV23) 2014    Tdap 2020, 2021       Allergies   Allergen Reactions    Indomethacin Palpitations and Shortness Of Breath    Meloxicam Itching and Shortness Of Breath     Other reaction(s): soa, itching    Moxifloxacin Seizure     Other  reaction(s): seizures    Morphine Hives and Itching     Other reaction(s): hives          Current Outpatient Medications:     acetaminophen (TYLENOL) 650 MG 8 hr tablet, TAKE 2 TABLETS BY MOUTH EVERY 8 HOURS FOR 15 DAYS AS NEEDED, Disp: , Rfl:     albuterol sulfate  (90 Base) MCG/ACT inhaler, Inhale 2 puffs Every 4 (Four) Hours As Needed., Disp: , Rfl:     atorvastatin (LIPITOR) 10 MG tablet, Take 1 tablet by mouth Daily., Disp: , Rfl:     busPIRone (BUSPAR) 10 MG tablet, Take 1 tablet by mouth 2 (Two) Times a Day As Needed., Disp: , Rfl:     carvedilol (COREG) 6.25 MG tablet, Take 1 tablet by mouth 2 (Two) Times a Day With Meals., Disp: 180 tablet, Rfl: 6    cetirizine (zyrTEC) 10 MG tablet, Take 1 tablet by mouth Daily., Disp: , Rfl:     Cholecalciferol (Vitamin D3) 50 MCG (2000 UT) capsule, Take 1 capsule by mouth Daily., Disp: , Rfl:     diclofenac (VOLTAREN) 50 MG EC tablet, Take 1 tablet by mouth Daily., Disp: , Rfl:     docusate sodium 100 MG capsule, Take 1 capsule by mouth 2 (Two) Times a Day As Needed., Disp: , Rfl:     fluticasone (FLONASE) 50 MCG/ACT nasal spray, 1 spray by Each Nare route Daily As Needed., Disp: , Rfl:     HYDROcodone-acetaminophen (NORCO) 5-325 MG per tablet, Take 1 tablet by mouth 3 (Three) Times a Day As Needed., Disp: , Rfl:     Linzess 145 MCG capsule capsule, TAKE 1 CAPSULE BY MOUTH DAILY 30 MINUTES BEFORE FIRST MEAL OF THE DAY ON AN EMPTY STOMACH, Disp: , Rfl:     meclizine (ANTIVERT) 25 MG tablet, Take 1 tablet by mouth Daily As Needed for Dizziness., Disp: , Rfl:     omeprazole (prilOSEC) 10 MG capsule, Take 1 capsule by mouth Daily., Disp: , Rfl:     tiZANidine (ZANAFLEX) 4 MG tablet, Take 1 tablet by mouth Daily As Needed for Muscle Spasms., Disp: , Rfl:     loratadine (CLARITIN) 10 MG tablet, Take 1 tablet by mouth Daily. (Patient not taking: Reported on 11/11/2024), Disp: , Rfl:          Vital Signs   /85 (BP Location: Left arm, Patient Position: Sitting, Cuff  "Size: Adult)   Pulse 75   Temp 97.8 °F (36.6 °C)   Resp 16   Ht 157.5 cm (62\")   Wt 50.8 kg (112 lb)   SpO2 99% Comment: room air  BMI 20.49 kg/m²       Objective     Physical Exam  Vitals reviewed.   Constitutional:       General: She is not in acute distress.     Appearance: Normal appearance. She is not ill-appearing.   HENT:      Head: Normocephalic and atraumatic.      Nose: Nose normal.      Mouth/Throat:      Mouth: Mucous membranes are moist.      Pharynx: Oropharynx is clear.   Eyes:      Extraocular Movements: Extraocular movements intact.      Conjunctiva/sclera: Conjunctivae normal.      Pupils: Pupils are equal, round, and reactive to light.   Cardiovascular:      Rate and Rhythm: Normal rate and regular rhythm.      Pulses: Normal pulses.      Heart sounds: Normal heart sounds.   Pulmonary:      Effort: Pulmonary effort is normal. No respiratory distress.      Breath sounds: Normal breath sounds. No stridor. No wheezing, rhonchi or rales.   Abdominal:      General: Bowel sounds are normal.   Musculoskeletal:         General: Normal range of motion.      Cervical back: Normal range of motion and neck supple.   Skin:     General: Skin is warm and dry.   Neurological:      Mental Status: She is alert and oriented to person, place, and time.   Psychiatric:         Behavior: Behavior normal.         Results Review  I have personally reviewed the prior office notes, hospital records, labs, and diagnostics.  XR Chest 2 View [[id number]] (Order [id number])  Order  Status: Final result     Appointment Information    PACS Images     Radiology Images  Study Result    Narrative & Impression   XR CHEST 2 VW     Date of Exam: 9/2/2024 3:26 PM EDT     Indication: cough, weakness     Comparison: [facility] chest x-ray 4/17/2023     Findings:  Lungs are adequately expanded and appear clear. No consolidation or pleural effusion is seen. Cardiomediastinal contours are stable.     IMPRESSION:  Impression:  No acute " cardiopulmonary abnormality is identified.        Electronically Signed: Lisa Arzola    9/2/2024 3:51 PM EDT    Workstation ID: [id number]     Assessment         Patient Active Problem List   Diagnosis    Nicotine use    Chest pain    Shortness of breath    COPD (chronic obstructive pulmonary disease)    Severe malnutrition    S/P exploratory laparotomy with lysis of adhesions    Essential hypertension    Pulmonary hypertension        Plan     Diagnoses and all orders for this visit:    1. Dyspnea on exertion (Primary)  -     6 Minute Walk Test; Future  -     Alpha - 1 - Antitrypsin Phenotype; Future  -     Complete PFT - Pre & Post Bronchodilator; Future  -     Cancel: Adult Transthoracic Echo Complete W/ Cont if Necessary Per Protocol; Future  -     Cancel: CT Chest With Contrast; Future  -     CT Chest Without Contrast; Future    2. Nicotine dependence, cigarettes, in remission  -     CT Chest Without Contrast; Future    3. Cough, unspecified type  -     CT Chest Without Contrast; Future             Smoking status:  reports that she quit smoking about 2 years ago. Her smoking use included cigarettes. She started smoking about 11 years ago. She has a 2.5 pack-year smoking history. She has never been exposed to tobacco smoke. She has never used smokeless tobacco.    Vaccination status: Reviewed  Immunization History   Administered Date(s) Administered    31-influenza Vac Quardvalent Preservativ 10/14/2019, 10/08/2021, 10/10/2023, 10/02/2024    COVID-19 (PFIZER) Purple Cap Monovalent 07/01/2021, 07/30/2021, 08/20/2021    Fluzone  >6mos 10/29/2012, 10/28/2013, 10/30/2014, 10/26/2016, 10/05/2017, 11/21/2018, 10/14/2019, 09/23/2022    Influenza Injectable Mdck Pf Quad 10/05/2017, 11/21/2018, 09/23/2022    Pneumococcal Conjugate 13-Valent (PCV13) 06/23/2020    Pneumococcal Polysaccharide (PPSV23) 04/28/2014    Tdap 08/17/2020, 05/22/2021        Medications personally reviewed    Follow Up  Return in about 4 months  (around 3/11/2025) for after PFT with MD.    Patient was given instructions and counseling regarding her condition or for health maintenance advice. Please see specific information pulled into the AVS if appropriate.     I spent 20 minutes caring for Fanta Hawkins on this date of service. This time includes time spent by me in the following activities:preparing for the visit, reviewing tests, obtaining and/or reviewing a separately obtained history, performing a medically appropriate examination and/or evaluation, counseling and educating the patient/family/caregiver, ordering medications, tests, or procedures, documenting information in the medical record, independently interpreting results and communicating that information with the patient/family/caregiver and answered questions family members, discuss medications.

## 2024-11-11 ENCOUNTER — OFFICE VISIT (OUTPATIENT)
Dept: PULMONOLOGY | Facility: CLINIC | Age: 74
End: 2024-11-11
Payer: MEDICARE

## 2024-11-11 VITALS
RESPIRATION RATE: 16 BRPM | HEIGHT: 62 IN | OXYGEN SATURATION: 99 % | WEIGHT: 112 LBS | BODY MASS INDEX: 20.61 KG/M2 | TEMPERATURE: 97.8 F | DIASTOLIC BLOOD PRESSURE: 85 MMHG | HEART RATE: 75 BPM | SYSTOLIC BLOOD PRESSURE: 154 MMHG

## 2024-11-11 DIAGNOSIS — F17.211 NICOTINE DEPENDENCE, CIGARETTES, IN REMISSION: ICD-10-CM

## 2024-11-11 DIAGNOSIS — R05.9 COUGH, UNSPECIFIED TYPE: ICD-10-CM

## 2024-11-11 DIAGNOSIS — R06.09 DYSPNEA ON EXERTION: Primary | ICD-10-CM

## 2024-11-11 PROCEDURE — 1160F RVW MEDS BY RX/DR IN RCRD: CPT | Performed by: NURSE PRACTITIONER

## 2024-11-11 PROCEDURE — 3079F DIAST BP 80-89 MM HG: CPT | Performed by: NURSE PRACTITIONER

## 2024-11-11 PROCEDURE — 3077F SYST BP >= 140 MM HG: CPT | Performed by: NURSE PRACTITIONER

## 2024-11-11 PROCEDURE — 1159F MED LIST DOCD IN RCRD: CPT | Performed by: NURSE PRACTITIONER

## 2024-11-11 PROCEDURE — 99214 OFFICE O/P EST MOD 30 MIN: CPT | Performed by: NURSE PRACTITIONER

## 2024-11-11 RX ORDER — SENNOSIDES 8.6 MG
CAPSULE ORAL
COMMUNITY
Start: 2024-10-02

## 2024-11-11 RX ORDER — CETIRIZINE HYDROCHLORIDE 10 MG/1
1 TABLET ORAL DAILY
COMMUNITY
Start: 2024-10-21

## 2024-11-11 RX ORDER — LINACLOTIDE 145 UG/1
CAPSULE, GELATIN COATED ORAL
COMMUNITY
Start: 2024-10-17

## 2025-01-20 ENCOUNTER — HOSPITAL ENCOUNTER (OUTPATIENT)
Dept: RESPIRATORY THERAPY | Facility: HOSPITAL | Age: 75
Discharge: HOME | End: 2025-01-20
Payer: MEDICARE

## 2025-01-20 ENCOUNTER — HOSPITAL ENCOUNTER (OUTPATIENT)
Dept: CT IMAGING | Facility: HOSPITAL | Age: 75
Discharge: HOME | End: 2025-01-20
Payer: MEDICARE

## 2025-01-20 DIAGNOSIS — R05.9 COUGH, UNSPECIFIED TYPE: ICD-10-CM

## 2025-01-20 DIAGNOSIS — F17.211 NICOTINE DEPENDENCE, CIGARETTES, IN REMISSION: ICD-10-CM

## 2025-01-20 DIAGNOSIS — R06.09 DYSPNEA ON EXERTION: ICD-10-CM

## 2025-01-20 PROCEDURE — 94729 DIFFUSING CAPACITY: CPT

## 2025-01-20 PROCEDURE — 94726 PLETHYSMOGRAPHY LUNG VOLUMES: CPT

## 2025-01-20 PROCEDURE — 94618 PULMONARY STRESS TESTING: CPT

## 2025-01-20 PROCEDURE — 94060 EVALUATION OF WHEEZING: CPT

## 2025-01-20 PROCEDURE — 71250 CT THORAX DX C-: CPT

## 2025-01-20 RX ORDER — ALBUTEROL SULFATE 0.83 MG/ML
2.5 SOLUTION RESPIRATORY (INHALATION) ONCE
Status: COMPLETED | OUTPATIENT
Start: 2025-01-20 | End: 2025-01-20

## 2025-01-20 RX ADMIN — ALBUTEROL SULFATE 2.5 MG: 2.5 SOLUTION RESPIRATORY (INHALATION) at 13:05

## 2025-01-20 NOTE — PROGRESS NOTES
Exercise Oximetry    Patient Name:Fanta Hawkins   MRN: [id number]   Date: 01/20/25             ROOM AIR BASELINE   SpO2% 100   Heart Rate 72   Blood Pressure 154/95     EXERCISE ON ROOM AIR SpO2% EXERCISE ON O2 @  LPM SpO2%   1 MINUTE 100 1 MINUTE    2 MINUTES 100 2 MINUTES    3 MINUTES 99 3 MINUTES    4 MINUTES 100 4 MINUTES    5 MINUTES 99 5 MINUTES    6 MINUTES 99 6 MINUTES               Distance Walked  1000 feet Distance Walked   Dyspnea (Aries Scale)  8 Dyspnea (Aries Scale)   Fatigue (Aries Scale)  8 Fatigue (Aries Scale)   SpO2% Post Exercise  100 SpO2% Post Exercise   HR Post Exercise  81 HR Post Exercise   Time to Recovery   Time to Recovery     Comments:

## 2025-01-22 DIAGNOSIS — N63.0 BREAST NODULE: ICD-10-CM

## 2025-01-22 DIAGNOSIS — R59.0 AXILLARY LYMPHADENOPATHY: ICD-10-CM

## 2025-01-22 DIAGNOSIS — R93.89 ABNORMAL CT OF THE CHEST: Primary | ICD-10-CM

## 2025-01-22 DIAGNOSIS — R92.8 OTHER ABNORMAL AND INCONCLUSIVE FINDINGS ON DIAGNOSTIC IMAGING OF BREAST: ICD-10-CM

## 2025-02-04 ENCOUNTER — TELEPHONE (OUTPATIENT)
Dept: PULMONOLOGY | Facility: CLINIC | Age: 75
End: 2025-02-04
Payer: MEDICARE

## 2025-02-04 DIAGNOSIS — R59.0 AXILLARY LYMPHADENOPATHY: ICD-10-CM

## 2025-02-04 DIAGNOSIS — R92.8 OTHER ABNORMAL AND INCONCLUSIVE FINDINGS ON DIAGNOSTIC IMAGING OF BREAST: ICD-10-CM

## 2025-02-04 DIAGNOSIS — N63.0 BREAST NODULE: ICD-10-CM

## 2025-02-04 DIAGNOSIS — R93.89 ABNORMAL CT OF THE CHEST: Primary | ICD-10-CM

## 2025-02-04 NOTE — TELEPHONE ENCOUNTER
Received communication from financial clearance (Carolina Bradford) that breast ultrasound needs to be limited versus whole indicated in the order.  JIN Castaneda agreeable and order was modified to indicate limited.

## 2025-02-20 ENCOUNTER — OFFICE VISIT (OUTPATIENT)
Dept: GASTROENTEROLOGY | Facility: CLINIC | Age: 75
End: 2025-02-20
Payer: MEDICARE

## 2025-02-20 VITALS
BODY MASS INDEX: 22.49 KG/M2 | SYSTOLIC BLOOD PRESSURE: 149 MMHG | HEART RATE: 72 BPM | WEIGHT: 122.2 LBS | DIASTOLIC BLOOD PRESSURE: 87 MMHG | HEIGHT: 62 IN

## 2025-02-20 DIAGNOSIS — D64.9 ANEMIA, UNSPECIFIED TYPE: ICD-10-CM

## 2025-02-20 DIAGNOSIS — R19.4 CHANGE IN BOWEL HABITS: ICD-10-CM

## 2025-02-20 DIAGNOSIS — R11.10 REGURGITATION OF FOOD: ICD-10-CM

## 2025-02-20 DIAGNOSIS — R13.19 ESOPHAGEAL DYSPHAGIA: Primary | ICD-10-CM

## 2025-02-20 DIAGNOSIS — K59.04 CHRONIC IDIOPATHIC CONSTIPATION: ICD-10-CM

## 2025-02-20 RX ORDER — ALBUTEROL SULFATE 90 UG/1
INHALANT RESPIRATORY (INHALATION)
COMMUNITY

## 2025-02-20 RX ORDER — GABAPENTIN 300 MG/1
CAPSULE ORAL
COMMUNITY
Start: 2025-01-13

## 2025-02-20 NOTE — PROGRESS NOTES
Patient Name: Fanta Hawkins   Visit Date: 02/20/2025   Patient ID: [id number]  Provider: JIN Cortez    Sex: female  Location:  Location Address:  Location Phone: [address]  [place] [address] [phone]    YOB: 1950  Age: 74 y.o.   Primary Care Provider Edaurd Minaya MD      Referring Provider: Eduard Minaya MD        Chief Complaint  Constipation (Pt states having 3 Bms weekly with straining, having hard-small stool ), Nausea, and Abdominal Pain (Lower ABD pain )    History of Present Illness    New pt presents with constipation x 3-4 months, pt states she can feel stool feel stuck in rectal area and she performs digital disimpaction. Vance #1. No blood seen in stool.   Has tried Linzess 145 mcg/d - states caused diarrhea  Occasional discomfort lower abdomen, states can get sore if constipated or with straining. No abd pain w meals  No wt loss  Pt takes omeprazole 10 mg/d , pt has c/o regurgitation, denies much issue with HB, has been taking for about 1 yr. Pt states occasionally with liquids she feels it stop in chest and it's painful , denies issues w solids.   Pt has been anemic for at least 1 yr per her report  Pt states she had a perforation with her last colonoscopy 2012  Past Medical History:   Diagnosis Date    Anemia 2/20/2025    Anxiety     Arthritis     Asthma     Chest pain     COPD (chronic obstructive pulmonary disease) 08/15/2022    Essential hypertension 08/24/2022    Injury of back     Mood disorder     S/P exploratory laparotomy with lysis of adhesions 08/22/2022       Past Surgical History:   Procedure Laterality Date    BACK SURGERY      rods and pins    BLADDER SURGERY      CARDIAC CATHETERIZATION N/A 8/21/2024    Procedure: Right Heart Cath;  Surgeon: Arnol Britt MD;  Location: [facility] CATH INVASIVE LOCATION;  Service: Cardiology;  Laterality: N/A;    COLONOSCOPY      EXPLORATORY LAPAROTOMY N/A 03/19/2012    repair of colon  "perforation after colonoscopy    EXPLORATORY LAPAROTOMY N/A 2022    Procedure: LAPAROTOMY EXPLORATORY;  Surgeon: Snehal Medina MD;  Location: [facility];  Service: General;  Laterality: N/A;    EYE SURGERY      HYSTERECTOMY         Allergies   Allergen Reactions    Indomethacin Palpitations and Shortness Of Breath    Meloxicam Itching and Shortness Of Breath     Other reaction(s): soa, itching    Moxifloxacin Seizure     Other reaction(s): seizures    Morphine Hives and Itching     Other reaction(s): hives       Family History   Problem Relation Age of Onset    Emphysema Mother     COPD Mother     Malig Hyperthermia Neg Hx     Colon cancer Neg Hx         Social History     Tobacco Use    Smoking status: Former     Current packs/day: 0.00     Average packs/day: 0.3 packs/day for 10.0 years (2.5 ttl pk-yrs)     Types: Cigarettes     Start date:      Quit date: 2022     Years since quittin.4     Passive exposure: Never    Smokeless tobacco: Never   Vaping Use    Vaping status: Never Used   Substance Use Topics    Alcohol use: Never    Drug use: Never       Objective     Vital Signs:   /87 (BP Location: Right arm, Patient Position: Sitting, Cuff Size: Adult)   Pulse 72   Ht 157.5 cm (62\")   Wt 55.4 kg (122 lb 3.2 oz)   BMI 22.35 kg/m²       Physical Exam  Constitutional:       General: The patient is not in acute distress.     Appearance: Normal appearance.   HENT:      Head: Normocephalic and atraumatic.      Nose: Nose normal.   Pulmonary:      Effort: Pulmonary effort is normal. No respiratory distress.   Abdominal:      General: Abdomen is flat.      Palpations: Abdomen is soft. There is no mass.      Tenderness: There is no abdominal tenderness. There is no guarding.   Musculoskeletal:      Cervical back: Neck supple.      Right lower leg: No edema.      Left lower leg: No edema.   Skin:     General: Skin is warm and dry.   Neurological:      General: No focal deficit present. "      Mental Status: The patient is alert and oriented to person, place, and time.      Gait: Gait normal.   Psychiatric:         Mood and Affect: Mood normal.         Speech: Speech normal.         Behavior: Behavior normal.         Thought Content: Thought content normal.     Result Review :   The following data was reviewed by: JIN Cortez on 02/20/2025:    CBC w/diff          8/21/2024    08:09 8/21/2024    13:09 8/21/2024    13:16 8/21/2024    13:20   CBC w/Diff   WBC 4.32       RBC 3.46       Hemoglobin 11.0  10.5  10.2  9.2    Hematocrit 33.9  31  30  27    MCV 98.0       MCH 31.8       MCHC 32.4       RDW 12.5       Platelets 247         CMP          8/21/2024    08:09   CMP   Glucose 95    BUN 22    Creatinine 0.80    EGFR 78.9    Sodium 139    Potassium 3.8    Chloride 104    Calcium 9.0    BUN/Creatinine Ratio 27.5    Anion Gap 6.9                    Assessment and Plan    Diagnoses and all orders for this visit:    1. Esophageal dysphagia (Primary)  -     Case Request; Standing  -     Case Request    2. Anemia, unspecified type  -     Case Request; Standing  -     Case Request    3. Chronic idiopathic constipation    4. Regurgitation of food  -     Case Request; Standing  -     Case Request    5. Change in bowel habits    Other orders  -     linaclotide (Linzess) 72 MCG capsule capsule; Take 1 capsule by mouth Every Morning Before Breakfast for 90 days.  Dispense: 90 capsule; Refill: 0  -     Follow Anesthesia Guidelines / Protocol; Standing  -     Follow Anesthesia Guidelines / Protocol; Future  -     Verify NPO; Standing              Follow Up   Return for follow up after procedure.  I recommended colonoscopy d/t anemia and change in bowel pattern but pt refused d/t hx complication in the past. She describes perforation occurred in 2012. Pt states she has hx scar tissue.  I explained that a colonoscopy is the best way to screen for colon cancer, and without doing this we do not know if  she has colon cancer or not; pt verbalized understanding but still declined.   We will start Linzess 72 mcg/d , give instruction sheet.  Encouraged patient to avoid rectal stimulation or digital disimpaction for constipation.  Pt has anemia and is taking Omeprazole  Pt was agreeable to EGD Surgical Risk and Benefits: Possible risks/complications, benefits, and alternatives to surgical or invasive procedure have been explained to patient and/or legal guardian; risks include bleeding, infection, and perforation. Patient has been evaluated and can tolerate anesthesia and/or sedation. Risks, benefits, and alternatives to anesthesia and sedation have been explained to patient and/or legal guardian.   Cardio: Dr.Monnin Castillo:      Patient was given instructions and counseling regarding her condition or for health maintenance advice. Please see specific information pulled into the AVS if appropriate.

## 2025-02-21 ENCOUNTER — HOSPITAL ENCOUNTER (OUTPATIENT)
Dept: ULTRASOUND IMAGING | Facility: HOSPITAL | Age: 75
Discharge: HOME | End: 2025-02-21
Payer: MEDICARE

## 2025-02-21 ENCOUNTER — HOSPITAL ENCOUNTER (OUTPATIENT)
Dept: MAMMOGRAPHY | Facility: HOSPITAL | Age: 75
Discharge: HOME | End: 2025-02-21
Payer: MEDICARE

## 2025-02-21 DIAGNOSIS — R93.89 ABNORMAL CT OF THE CHEST: ICD-10-CM

## 2025-02-21 DIAGNOSIS — N63.0 BREAST NODULE: ICD-10-CM

## 2025-02-21 DIAGNOSIS — N63.0 BREAST NODULE: Primary | ICD-10-CM

## 2025-02-21 DIAGNOSIS — R92.8 OTHER ABNORMAL AND INCONCLUSIVE FINDINGS ON DIAGNOSTIC IMAGING OF BREAST: ICD-10-CM

## 2025-02-21 DIAGNOSIS — R59.0 AXILLARY LYMPHADENOPATHY: ICD-10-CM

## 2025-02-21 PROCEDURE — G0279 TOMOSYNTHESIS, MAMMO: HCPCS

## 2025-02-21 PROCEDURE — 77066 DX MAMMO INCL CAD BI: CPT

## 2025-02-21 PROCEDURE — 76642 ULTRASOUND BREAST LIMITED: CPT

## 2025-02-24 ENCOUNTER — TELEPHONE (OUTPATIENT)
Dept: GASTROENTEROLOGY | Facility: CLINIC | Age: 75
End: 2025-02-24
Payer: MEDICARE

## 2025-02-24 NOTE — TELEPHONE ENCOUNTER
2025    Dear Dr Britt,      Patient Name: Fanta Hawkins  : 1950      This patient is waiting to have a Colonoscopy and/or Esophagogastroduodenoscopy which I will perform at [facility] on 3.19.25. Please respond to this request noting your recommendations regarding clearance from a Cardiac  standpoint.  You may contact our office at [phone] Option 2 with any questions. I appreciate your prompt response in this matter. Please return this form to our office as soon as possible to [phone].    ____ I approve my patient from a Cardiac  standpoint    ____ I do NOT approve my patient from a Cardiac  standpoint at this time    Please inform our office if the patient requires additional follow-up from your office prior to scheduled procedure date.      Please specify clearance expiration date:____________________________________      Approving physician name (please print): _____________________________________________      Approving physician signature: ________________________________ Date:________________  Sincerely,  [facility] - Gastroenterology   Dr. Hernandez          Please fax approval or denial to our office as soon as possible.

## 2025-02-24 NOTE — TELEPHONE ENCOUNTER
Procedure: Colonoscopy and/or EGD     Med Directive: n/a     PMH: HTN, dyspnea on exertion, abnormal echo     Last Seen: 10/08/2024    Echo 10/16/23    Normal left ventricular systolic function.  Trace aortic regurgitation.  Trace MR.  Moderate tricuspid regurgitation with moderate pulmonary hypertension.  Estimated pulmonary artery systolic pressure around 45 to 50 mm.

## 2025-02-24 NOTE — TELEPHONE ENCOUNTER
2025    Dear Dr Madrid,      Patient Name: Fanta Hawkins  : 1950      This patient is waiting to have a Colonoscopy and/or Esophagogastroduodenoscopy which I will perform at [facility] on 3.19.25. Please respond to this request noting your recommendations regarding clearance from a Pulmonary  standpoint.  You may contact our office at [phone] Option 2 with any questions. I appreciate your prompt response in this matter. Please return this form to our office as soon as possible to [phone].    ____ I approve my patient from a Pulmonary  standpoint    ____ I do NOT approve my patient from a Pulmonary  standpoint at this time    Please inform our office if the patient requires additional follow-up from your office prior to scheduled procedure date.      Please specify clearance expiration date:____________________________________      Approving physician name (please print): _____________________________________________      Approving physician signature: ________________________________ Date:________________  Sincerely,  [facility] - Gastroenterology   Dr. Hernandez           Please fax approval or denial to our office as soon as possible.

## 2025-03-07 PROCEDURE — 87086 URINE CULTURE/COLONY COUNT: CPT | Performed by: NURSE PRACTITIONER

## 2025-03-07 PROCEDURE — 87660 TRICHOMONAS VAGIN DIR PROBE: CPT | Performed by: NURSE PRACTITIONER

## 2025-03-07 PROCEDURE — 87510 GARDNER VAG DNA DIR PROBE: CPT | Performed by: NURSE PRACTITIONER

## 2025-03-07 PROCEDURE — 87480 CANDIDA DNA DIR PROBE: CPT | Performed by: NURSE PRACTITIONER

## 2025-03-12 NOTE — PRE-PROCEDURE INSTRUCTIONS
"PAT attempted but no answer. Left detailed message. Called pt in regards to procedure on 3/19/25.Pt told to arrive at 0900 at entrance \"C\" and explained that this is an arrival time, not the procedure time. Expect to be here for 3 to 4 hours.     Must have  over the age of 18 to drive home. May have two visitors; however, children under the age of 12 must stay in waiting room with an adult.     Educated on diet/NPO. Pt was told that nothing can be placed in mouth two hours prior to arrival including gum, mints, cigarettes. Meds can be taken as usual but must be taken two hours prior to arrival. Diabetic medications, blood thinners, and diuretics must be held the night prior and the day of procedure. Weight-loss injections with weekly doses must be held a week prior.    Pt instructed to return call to confirm receipt of instructions and for any questions.  "

## 2025-03-13 ENCOUNTER — OFFICE VISIT (OUTPATIENT)
Dept: PULMONOLOGY | Facility: CLINIC | Age: 75
End: 2025-03-13
Payer: MEDICARE

## 2025-03-13 VITALS
HEIGHT: 62 IN | HEART RATE: 73 BPM | TEMPERATURE: 97.9 F | SYSTOLIC BLOOD PRESSURE: 169 MMHG | BODY MASS INDEX: 21.02 KG/M2 | DIASTOLIC BLOOD PRESSURE: 111 MMHG | OXYGEN SATURATION: 97 % | WEIGHT: 114.2 LBS | RESPIRATION RATE: 18 BRPM

## 2025-03-13 DIAGNOSIS — J43.2 CENTRILOBULAR EMPHYSEMA: ICD-10-CM

## 2025-03-13 DIAGNOSIS — I27.20 PULMONARY HYPERTENSION: Primary | ICD-10-CM

## 2025-03-13 DIAGNOSIS — R06.02 SHORTNESS OF BREATH: ICD-10-CM

## 2025-03-13 DIAGNOSIS — Z72.0 NICOTINE USE: ICD-10-CM

## 2025-03-13 PROCEDURE — 99214 OFFICE O/P EST MOD 30 MIN: CPT | Performed by: INTERNAL MEDICINE

## 2025-03-13 PROCEDURE — 3077F SYST BP >= 140 MM HG: CPT | Performed by: INTERNAL MEDICINE

## 2025-03-13 PROCEDURE — 3080F DIAST BP >= 90 MM HG: CPT | Performed by: INTERNAL MEDICINE

## 2025-03-13 RX ORDER — GABAPENTIN 100 MG/1
100 CAPSULE ORAL 4 TIMES DAILY
COMMUNITY
Start: 2025-03-11

## 2025-03-13 RX ORDER — CARVEDILOL 12.5 MG/1
1 TABLET ORAL DAILY
COMMUNITY

## 2025-03-13 NOTE — PROGRESS NOTES
Primary Care Provider  Eduard Minaya MD     Referring Provider  No ref. provider found     Chief Complaint  Follow-up (3 month/Pulmonary clearance) and Shortness of Breath    Subjective          Fanta Hawkins presents to [facility] PULMONARY [facility]ICAL [facility]ARE MEDICINE  History of Present Illness  Fanta Hawkins is a 74 y.o. female patient   History of Present Illness  The patient presents for evaluation of shortness of breath.    She experiences dyspnea during ambulation, particularly when walking long distances such as from the parking lot to the clinic. She attributes this to an elevation in her blood pressure, which was recorded as 168/111 this morning. Despite these symptoms, she maintains an active lifestyle, although not to the extent she previously could. She has been abstinent from smoking for the past 3 years and ensures that no one smokes in her vicinity. She has sufficient albuterol inhalers at her disposal. She declines the option of pulmonary rehabilitation. She utilizes an albuterol inhaler twice daily as needed but does not require it on a daily basis.    She is scheduled for an endoscopy next Wednesday with Dr. Andersen due to reflux issues. This will be her first endoscopy. She was previously anemic, and they suspect possible bleeding in her stomach from ulcers.    She takes hydrocodone for pain management.    She recently had a mammogram, which revealed a nodule. They informed her that it did not appear to be cancerous, and they plan to recheck it in about 6 months.    Supplemental Information  She has been experiencing leg swelling, which she believes may be a side effect of Macrobid, a medication she is taking for an infection.    SOCIAL HISTORY  The patient has not smoked for the last 3 years.    MEDICATIONS  Current: hydrocodone, albuterol, Macrobid    IMMUNIZATIONS  She has received her flu shot and RSV vaccine.    Review of Systems     General:  No Fatigue, No Fever No  weight loss or loss of appetite  HEENT: No dysphagia, No Visual Changes, no rhinorrhea  Respiratory:  + cough,+Dyspnea, scant phlegm, No Pleuritic Pain, no wheezing, no hemoptysis.  Cardiovascular: Denies chest pain, denies palpitations,+EL, No Chest Pressure  Gastrointestinal:  No Abdominal Pain, No Nausea, No Vomiting, No Diarrhea  Genitourinary:  No Dysuria, No Frequency, No Hesitancy  Musculoskeletal: No muscle pain or swelling  Endocrine:  No Heat Intolerance, No Cold Intolerance  Hematologic:  No Bleeding, No Bruising  Psychiatric:  No Anxiety, No Depression  Neurologic:  No Confusion, no Dysarthria, No Headaches  Skin:  No Rash, No Open Wounds    Family History   Problem Relation Age of Onset    Emphysema Mother     COPD Mother     Malig Hyperthermia Neg Hx     Colon cancer Neg Hx         Social History     Socioeconomic History    Marital status:    Tobacco Use    Smoking status: Former     Current packs/day: 0.00     Average packs/day: 0.3 packs/day for 10.0 years (2.5 ttl pk-yrs)     Types: Cigarettes     Start date:      Quit date: 2022     Years since quittin.5     Passive exposure: Never    Smokeless tobacco: Never   Vaping Use    Vaping status: Never Used   Substance and Sexual Activity    Alcohol use: Never    Drug use: Never    Sexual activity: Defer        Past Medical History:   Diagnosis Date    Anemia 2025    Anxiety     Arthritis     Asthma     Chest pain     COPD (chronic obstructive pulmonary disease) 08/15/2022    Essential hypertension 2022    Injury of back     Mood disorder     S/P exploratory laparotomy with lysis of adhesions 2022        Immunization History   Administered Date(s) Administered    31-influenza Vac Quardvalent Preservativ 10/14/2019, 10/08/2021, 10/10/2023, 10/02/2024    COVID-19 (PFIZER) Purple Cap Monovalent 2021, 2021, 2021    Fluzone  >6mos 10/29/2012, 10/28/2013, 10/30/2014, 10/26/2016, 10/05/2017, 2018,  10/14/2019, 09/23/2022    Influenza Injectable Mdck Pf Quad 10/05/2017, 11/21/2018, 09/23/2022    Pneumococcal Conjugate 13-Valent (PCV13) 06/23/2020    Pneumococcal Polysaccharide (PPSV23) 04/28/2014    Tdap 08/17/2020, 05/22/2021         Allergies   Allergen Reactions    Indomethacin Palpitations and Shortness Of Breath    Meloxicam Itching and Shortness Of Breath     Other reaction(s): soa, itching    Moxifloxacin Seizure     Other reaction(s): seizures    Morphine Hives and Itching     Other reaction(s): hives          Current Outpatient Medications:     acetaminophen (TYLENOL) 650 MG 8 hr tablet, TAKE 2 TABLETS BY MOUTH EVERY 8 HOURS FOR 15 DAYS AS NEEDED, Disp: , Rfl:     albuterol sulfate HFA (Ventolin HFA) 108 (90 Base) MCG/ACT inhaler, Every 4 (Four) Hours., Disp: , Rfl:     busPIRone (BUSPAR) 10 MG tablet, Take 1 tablet by mouth 2 (Two) Times a Day As Needed., Disp: , Rfl:     carvedilol (COREG) 6.25 MG tablet, Take 1 tablet by mouth 2 (Two) Times a Day With Meals., Disp: 180 tablet, Rfl: 6    cetirizine (zyrTEC) 10 MG tablet, Take 1 tablet by mouth Daily., Disp: , Rfl:     diclofenac (VOLTAREN) 50 MG EC tablet, Take 1 tablet by mouth Daily., Disp: , Rfl:     fluconazole (DIFLUCAN) 150 MG tablet, Take one dose now, repeat in 7 days, Disp: 2 tablet, Rfl: 0    fluticasone (FLONASE) 50 MCG/ACT nasal spray, 1 spray by Each Nare route Daily As Needed., Disp: , Rfl:     gabapentin (NEURONTIN) 100 MG capsule, Take 1 capsule by mouth 4 (Four) Times a Day., Disp: , Rfl:     HYDROcodone-acetaminophen (NORCO) 5-325 MG per tablet, Take 1 tablet by mouth 3 (Three) Times a Day As Needed., Disp: , Rfl:     linaclotide (Linzess) 72 MCG capsule capsule, Take 1 capsule by mouth Every Morning Before Breakfast for 90 days., Disp: 90 capsule, Rfl: 0    meclizine (ANTIVERT) 25 MG tablet, Take 1 tablet by mouth Daily As Needed for Dizziness., Disp: , Rfl:     nitrofurantoin, macrocrystal-monohydrate, (MACROBID) 100 MG capsule,  "Take 1 capsule by mouth 2 (Two) Times a Day for 7 days., Disp: 14 capsule, Rfl: 0    phenazopyridine (PYRIDIUM) 200 MG tablet, Take 1 tablet by mouth 3 (Three) Times a Day As Needed for Bladder Spasms or Dysuria., Disp: 6 tablet, Rfl: 0    tiZANidine (ZANAFLEX) 4 MG tablet, Take 1 tablet by mouth Daily As Needed for Muscle Spasms., Disp: , Rfl:     albuterol sulfate  (90 Base) MCG/ACT inhaler, Inhale 2 puffs Every 4 (Four) Hours As Needed. (Patient not taking: Reported on 3/13/2025), Disp: , Rfl:     atorvastatin (LIPITOR) 10 MG tablet, Take 1 tablet by mouth Daily. (Patient not taking: Reported on 3/13/2025), Disp: , Rfl:     carvedilol (COREG) 12.5 MG tablet, Take 1 tablet by mouth Daily. (Patient not taking: Reported on 3/13/2025), Disp: , Rfl:     docusate sodium 100 MG capsule, Take 1 capsule by mouth 2 (Two) Times a Day As Needed. (Patient not taking: Reported on 3/13/2025), Disp: , Rfl:     gabapentin (NEURONTIN) 300 MG capsule, Take 1 capsule 3 times a day by oral route as directed for 30 days. (Patient not taking: Reported on 3/13/2025), Disp: , Rfl:     linaclotide (Linzess) 145 MCG capsule capsule, Take 1 capsule by mouth Every Morning Before Breakfast. (Patient not taking: Reported on 3/13/2025), Disp: , Rfl:     omeprazole (prilOSEC) 10 MG capsule, Take 1 capsule by mouth Daily. (Patient not taking: Reported on 3/13/2025), Disp: , Rfl:      Objective   Physical Exam  Physical Exam  A slight wheeze is heard in the lungs.    Vital Signs  Blood pressure was 168/111 this morning.    Vital Signs:   BP (!) 169/111 (BP Location: Right arm, Patient Position: Sitting, Cuff Size: Adult)   Pulse 73   Temp 97.9 °F (36.6 °C) (Tympanic)   Resp 18   Ht 157.5 cm (62\")   Wt 51.8 kg (114 lb 3.2 oz)   SpO2 97% Comment: room air  BMI 20.89 kg/m²       Vital Signs Reviewed  WDWN, Alert, NAD.   HEENT:  PERRL, EOMI.  OP, nares clear, no sinus tenderness  Mallampatti classification : 1  Neck:  Supple, no JVD, no " thyromegaly  Lymph: no axillary, cervical, supraclavicular lymphadenopathy noted bilaterally  Chest:  good aeration, bilateral diminished breath sounds, no wheezing, crackles or rhonchi, resonant to percussion b/l  CV: RRR, no MGR, pulses 2+, equal.  Abd:  Soft, NT, ND, + BS, no HSM  EXT:  no clubbing, no cyanosis, No BLE edema  Neuro:  A&Ox3, CN grossly intact, no focal deficits.  Skin: No rashes or lesions noted     Result Review :   The following data was reviewed by: Bud Madrid MD on 03/13/2025:  Common labs          8/21/2024    08:09 8/21/2024    13:09 8/21/2024    13:16 8/21/2024    13:20   Common Labs   Glucose 95       BUN 22       Creatinine 0.80       Sodium 139       Potassium 3.8       Chloride 104       Calcium 9.0       WBC 4.32       Hemoglobin 11.0  10.5  10.2  9.2    Hematocrit 33.9  31  30  27    Platelets 247         CMP          8/21/2024    08:09   CMP   Glucose 95    BUN 22    Creatinine 0.80    EGFR 78.9    Sodium 139    Potassium 3.8    Chloride 104    Calcium 9.0    BUN/Creatinine Ratio 27.5    Anion Gap 6.9      CBC          8/21/2024    08:09 8/21/2024    13:09 8/21/2024    13:16 8/21/2024    13:20   CBC   WBC 4.32       RBC 3.46       Hemoglobin 11.0  10.5  10.2  9.2    Hematocrit 33.9  31  30  27    MCV 98.0       MCH 31.8       MCHC 32.4       RDW 12.5       Platelets 247         CBC w/diff          8/21/2024    08:09 8/21/2024    13:09 8/21/2024    13:16 8/21/2024    13:20   CBC w/Diff   WBC 4.32       RBC 3.46       Hemoglobin 11.0  10.5  10.2  9.2    Hematocrit 33.9  31  30  27    MCV 98.0       MCH 31.8       MCHC 32.4       RDW 12.5       Platelets 247         Data reviewed : Radiologic studies Previous imaging reviewed.     Results  Imaging  CT scan shows probable emphysema in the right and left lung.    Testing  Spirometry and lung volumes are normal. Diffusion capacity shows a slight decline.             Assessment and Plan    Diagnoses and all orders for this visit:    1.  Pulmonary hypertension (Primary)    2. Centrilobular emphysema    3. Shortness of breath    4. Nicotine use      Assessment & Plan  1. Shortness of breath.  Lung function tests, including spirometry and lung volumes, are normal. However, there is a slight decline in diffusion capacity, likely related to previous smoking. A CT scan shows probable emphysema, with minor damage in the upper parts of the lungs. She is advised to continue using the albuterol inhaler as needed and to maintain an active lifestyle. She is encouraged to stay up to date on vaccinations, including influenza, pneumonia, and RSV. She is cleared for her upcoming GI procedure, with a mild risk of perioperative issues. If the need for the inhaler increases, a maintenance dose once a day can be considered.    2. Gastroesophageal Reflux Disease (GERD).  She reports experiencing reflux issues. An endoscopy is scheduled for next Wednesday to investigate potential stomach ulcers and anemia. She is advised to proceed with the scheduled endoscopy.    Preop pulm clearance: Patient is planned for EGD, Colonoscopy next week. She has mild symptoms and overall lung function is close to normal. She is cleared from pulm stand point for the GI procedures.     3. Pain management.  She is currently taking hydrocodone for pain.    4. Breast nodule.  A recent mammogram showed a breast nodule, which was followed up and deemed non-cancerous. A re-evaluation is planned in approximately 6 months.    Follow-up  The patient will follow up in 6 months.    Follow Up   Return in about 6 months (around 9/13/2025).  Patient was given instructions and counseling regarding her condition or for health maintenance advice. Please see specific information pulled into the AVS if appropriate.     Patient or patient representative verbalized consent for the use of Ambient Listening during the visit with  Bud Madrid MD for chart documentation. 3/13/2025  17:46 EDT    Electronically  signed by Bud Madrid MD, 3/13/2025, 17:47 EDT.

## 2025-03-18 ENCOUNTER — ANESTHESIA EVENT (OUTPATIENT)
Dept: GASTROENTEROLOGY | Facility: HOSPITAL | Age: 75
End: 2025-03-18
Payer: MEDICARE

## 2025-03-18 NOTE — ANESTHESIA PREPROCEDURE EVALUATION
Anesthesia Evaluation     Patient summary reviewed and Nursing notes reviewed   NPO Solid Status: > 8 hours  NPO Liquid Status: > 8 hours           Airway   Mallampati: I  TM distance: >3 FB  Neck ROM: full  No difficulty expected  Dental    (+) poor dentition        Pulmonary     breath sounds clear to auscultation  (+) COPD mild, asthma (mild, intermittent - inhaler prn),shortness of breath  Cardiovascular - normal exam  Exercise tolerance: good (4-7 METS)    ECG reviewed  Rhythm: regular  Rate: normal    (+) hypertension well controlled, valvular problems/murmurs MR      Neuro/Psych  (+) psychiatric history Anxiety  GI/Hepatic/Renal/Endo      Musculoskeletal     Abdominal    Substance History      OB/GYN          Other   arthritis,     ROS/Med Hx Other: Hx dysphagia, anemia, regurg of food    ECHO 10/13/23; EF 62%,   Normal left ventricular systolic function.  Trace aortic regurgitation.  Trace MR.  Moderate tricuspid regurgitation with moderate pulmonary hypertension.  Estimated pulmonary artery systolic pressure around 45 to 50 mm.    Cards clearance per Dr. Britt with acceptable risks on 02/24/25     EKG 04/17/23: HR 78,   Sinus rhythm  Probable left atrial enlargement   septal infarct, age indeterminate                 Anesthesia Plan    ASA 3     general   total IV anesthesia  (Total IV Anesthesia    Patient understands anesthesia not responsible for dental damage.      Discussed risks with pt including aspiration, allergic reactions, apnea, advanced airway placement. Pt verbalized understanding. All questions answered.     )  intravenous induction     Anesthetic plan, risks, benefits, and alternatives have been provided, discussed and informed consent has been obtained with: patient.  Pre-procedure education provided  Plan discussed with CRNA.      CODE STATUS:

## 2025-03-19 ENCOUNTER — ANESTHESIA (OUTPATIENT)
Dept: GASTROENTEROLOGY | Facility: HOSPITAL | Age: 75
End: 2025-03-19
Payer: MEDICARE

## 2025-03-19 ENCOUNTER — HOSPITAL ENCOUNTER (OUTPATIENT)
Facility: HOSPITAL | Age: 75
Setting detail: HOSPITAL OUTPATIENT SURGERY
Discharge: HOME | End: 2025-03-19
Attending: INTERNAL MEDICINE | Admitting: INTERNAL MEDICINE
Payer: MEDICARE

## 2025-03-19 VITALS
RESPIRATION RATE: 16 BRPM | SYSTOLIC BLOOD PRESSURE: 165 MMHG | HEART RATE: 73 BPM | OXYGEN SATURATION: 97 % | BODY MASS INDEX: 21.13 KG/M2 | WEIGHT: 115.52 LBS | TEMPERATURE: 98.7 F | DIASTOLIC BLOOD PRESSURE: 86 MMHG

## 2025-03-19 DIAGNOSIS — R11.10 REGURGITATION OF FOOD: ICD-10-CM

## 2025-03-19 DIAGNOSIS — R13.19 ESOPHAGEAL DYSPHAGIA: ICD-10-CM

## 2025-03-19 DIAGNOSIS — D64.9 ANEMIA, UNSPECIFIED TYPE: ICD-10-CM

## 2025-03-19 PROCEDURE — 25010000002 PROPOFOL 10 MG/ML EMULSION

## 2025-03-19 PROCEDURE — 25010000002 LIDOCAINE PF 2% 2 % SOLUTION

## 2025-03-19 PROCEDURE — 88305 TISSUE EXAM BY PATHOLOGIST: CPT | Performed by: INTERNAL MEDICINE

## 2025-03-19 PROCEDURE — 25810000003 LACTATED RINGERS PER 1000 ML

## 2025-03-19 PROCEDURE — 43239 EGD BIOPSY SINGLE/MULTIPLE: CPT | Performed by: INTERNAL MEDICINE

## 2025-03-19 RX ORDER — PROPOFOL 10 MG/ML
VIAL (ML) INTRAVENOUS AS NEEDED
Status: DISCONTINUED | OUTPATIENT
Start: 2025-03-19 | End: 2025-03-19 | Stop reason: SURG

## 2025-03-19 RX ORDER — SODIUM CHLORIDE, SODIUM LACTATE, POTASSIUM CHLORIDE, CALCIUM CHLORIDE 600; 310; 30; 20 MG/100ML; MG/100ML; MG/100ML; MG/100ML
30 INJECTION, SOLUTION INTRAVENOUS CONTINUOUS
Status: DISCONTINUED | OUTPATIENT
Start: 2025-03-19 | End: 2025-03-19 | Stop reason: HOSPADM

## 2025-03-19 RX ORDER — LIDOCAINE HYDROCHLORIDE 20 MG/ML
INJECTION, SOLUTION EPIDURAL; INFILTRATION; INTRACAUDAL; PERINEURAL AS NEEDED
Status: DISCONTINUED | OUTPATIENT
Start: 2025-03-19 | End: 2025-03-19 | Stop reason: SURG

## 2025-03-19 RX ADMIN — PROPOFOL 50 MG: 10 INJECTION, EMULSION INTRAVENOUS at 11:24

## 2025-03-19 RX ADMIN — SODIUM CHLORIDE, SODIUM LACTATE, POTASSIUM CHLORIDE, CALCIUM CHLORIDE 30 ML/HR: 20; 30; 600; 310 INJECTION, SOLUTION INTRAVENOUS at 10:41

## 2025-03-19 RX ADMIN — LIDOCAINE HYDROCHLORIDE 40 MG: 20 INJECTION, SOLUTION EPIDURAL; INFILTRATION; INTRACAUDAL; PERINEURAL at 11:24

## 2025-03-19 RX ADMIN — PROPOFOL 150 MCG/KG/MIN: 10 INJECTION, EMULSION INTRAVENOUS at 11:26

## 2025-03-19 NOTE — ANESTHESIA POSTPROCEDURE EVALUATION
Patient: Fanta Hawkins    Procedure Summary       Date: 03/19/25 Room / Location: [facility] ENDOSCOPY 4 / [facility] ENDOSCOPY    Anesthesia Start: 1122 Anesthesia Stop: 1141    Procedure: ESOPHAGOGASTRODUODENOSCOPY with biopsies Diagnosis:       Esophageal dysphagia      Anemia, unspecified type      Regurgitation of food      (Esophageal dysphagia [R13.19])      (Anemia, unspecified type [D64.9])      (Regurgitation of food [R11.10])    Surgeons: Eduard Hernandez MD Provider: Keisha Colunga CRNA    Anesthesia Type: general ASA Status: 3            Anesthesia Type: general    Vitals  Vitals Value Taken Time   /86 03/19/25 11:54   Temp 37.1 °C (98.7 °F) 03/19/25 11:54   Pulse 73 03/19/25 11:54   Resp 16 03/19/25 11:54   SpO2 97 % 03/19/25 11:54           Post Anesthesia Care and Evaluation    Post-procedure mental status: acceptable.  Pain management: satisfactory to patient    Airway patency: patent  Anesthetic complications: No anesthetic complications    Cardiovascular status: acceptable  Respiratory status: acceptable    Comments: Per chart review

## 2025-03-19 NOTE — H&P
Pre Procedure History & Physical    Chief Complaint:   Esophageal dysphagia and anemia    Subjective     HPI:   Esophageal dysphagia and anemia     Past Medical History:   Past Medical History:   Diagnosis Date    Anemia 2/20/2025    Anxiety     Arthritis     Asthma     Chest pain     COPD (chronic obstructive pulmonary disease) 08/15/2022    Essential hypertension 08/24/2022    Injury of back     Mood disorder     S/P exploratory laparotomy with lysis of adhesions 08/22/2022       Past Surgical History:  Past Surgical History:   Procedure Laterality Date    BACK SURGERY      rods and pins    BLADDER SURGERY      CARDIAC CATHETERIZATION N/A 8/21/2024    Procedure: Right Heart Cath;  Surgeon: Arnol Britt MD;  Location: [facility] CATH INVASIVE LOCATION;  Service: Cardiology;  Laterality: N/A;    COLONOSCOPY      EXPLORATORY LAPAROTOMY N/A 03/19/2012    repair of colon perforation after colonoscopy    EXPLORATORY LAPAROTOMY N/A 08/19/2022    Procedure: LAPAROTOMY EXPLORATORY;  Surgeon: Snehal Medina MD;  Location[facility];  Service: General;  Laterality: N/A;    EYE SURGERY      HYSTERECTOMY         Family History:  Family History   Problem Relation Age of Onset    Emphysema Mother     COPD Mother     Malig Hyperthermia Neg Hx     Colon cancer Neg Hx        Social History:   reports that she quit smoking about 2 years ago. Her smoking use included cigarettes. She started smoking about 12 years ago. She has a 2.5 pack-year smoking history. She has never been exposed to tobacco smoke. She has never used smokeless tobacco. She reports that she does not drink alcohol and does not use drugs.    Medications:   Medications Prior to Admission   Medication Sig Dispense Refill Last Dose/Taking    cetirizine (zyrTEC) 10 MG tablet Take 1 tablet by mouth Daily.   Past Week    diclofenac (VOLTAREN) 50 MG EC tablet Take 1 tablet by mouth Daily.   Past Week    fluconazole (DIFLUCAN) 150 MG tablet Take one dose  now, repeat in 7 days 2 tablet 0 Past Month    fluticasone (FLONASE) 50 MCG/ACT nasal spray 1 spray by Each Nare route Daily As Needed.   Past Month    HYDROcodone-acetaminophen (NORCO) 5-325 MG per tablet Take 1 tablet by mouth 3 (Three) Times a Day As Needed.   Past Week    linaclotide (Linzess) 72 MCG capsule capsule Take 1 capsule by mouth Every Morning Before Breakfast for 90 days. 90 capsule 0 Past Week    acetaminophen (TYLENOL) 650 MG 8 hr tablet TAKE 2 TABLETS BY MOUTH EVERY 8 HOURS FOR 15 DAYS AS NEEDED       albuterol sulfate HFA (Ventolin HFA) 108 (90 Base) MCG/ACT inhaler Every 4 (Four) Hours.   Unknown    busPIRone (BUSPAR) 10 MG tablet Take 1 tablet by mouth 2 (Two) Times a Day As Needed.   Unknown    carvedilol (COREG) 6.25 MG tablet Take 1 tablet by mouth 2 (Two) Times a Day With Meals. 180 tablet 6     docusate sodium 100 MG capsule Take 1 capsule by mouth 2 (Two) Times a Day As Needed. (Patient not taking: Reported on 3/13/2025)   Not Taking    gabapentin (NEURONTIN) 100 MG capsule Take 1 capsule by mouth 4 (Four) Times a Day.   Unknown    meclizine (ANTIVERT) 25 MG tablet Take 1 tablet by mouth Daily As Needed for Dizziness.   Unknown    tiZANidine (ZANAFLEX) 4 MG tablet Take 1 tablet by mouth Daily As Needed for Muscle Spasms.   Unknown       Allergies:  Indomethacin, Meloxicam, Moxifloxacin, and Morphine        Objective     Blood pressure 167/97, pulse 79, temperature 97.1 °F (36.2 °C), temperature source Temporal, resp. rate 19, weight 52.4 kg (115 lb 8.3 oz), SpO2 98%.    Physical Exam   Constitutional: Pt is oriented to person, place, and time and well-developed, well-nourished, and in no distress.   Mouth/Throat: Oropharynx is clear and moist.   Neck: Normal range of motion.   Cardiovascular: Normal rate, regular rhythm and normal heart sounds.    Pulmonary/Chest: Effort normal and breath sounds normal.   Abdominal: Soft. Nontender  Skin: Skin is warm and dry.   Psychiatric: Mood, memory,  affect and judgment normal.     Assessment & Plan     Diagnosis:  Esophageal dysphagia and anemia     Anticipated Surgical Procedure:  EGD    The risks, benefits, and alternatives of this procedure have been discussed with the patient or the responsible party- the patient understands and agrees to proceed.

## 2025-03-20 LAB
CYTO UR: NORMAL
LAB AP CASE REPORT: NORMAL
LAB AP CLINICAL INFORMATION: NORMAL
PATH REPORT.FINAL DX SPEC: NORMAL
PATH REPORT.GROSS SPEC: NORMAL

## 2025-03-25 ENCOUNTER — RESULTS FOLLOW-UP (OUTPATIENT)
Dept: GASTROENTEROLOGY | Facility: HOSPITAL | Age: 75
End: 2025-03-25
Payer: MEDICARE

## 2025-03-27 NOTE — TELEPHONE ENCOUNTER
Spoke to patient and informed of JIN Gonsalez result note and recommendations.  Verified patient understanding.     Patient is agreeable to discuss medication add/change at next office visit, however doesn't want to start anything at this time.  She does report that normally her blood pressure is high and verified she is taking Coreg as prescribed.    Confirmed follow up with JIN Gonsalez on 06.05.25 at 1030.    Advised patient to reach out to our office with any GI needs.

## 2025-04-11 ENCOUNTER — OFFICE VISIT (OUTPATIENT)
Dept: CARDIOLOGY | Facility: CLINIC | Age: 75
End: 2025-04-11
Payer: MEDICARE

## 2025-04-11 VITALS
BODY MASS INDEX: 21.16 KG/M2 | WEIGHT: 115 LBS | HEIGHT: 62 IN | HEART RATE: 78 BPM | SYSTOLIC BLOOD PRESSURE: 137 MMHG | DIASTOLIC BLOOD PRESSURE: 94 MMHG

## 2025-04-11 DIAGNOSIS — R06.02 SHORTNESS OF BREATH: Primary | ICD-10-CM

## 2025-04-11 DIAGNOSIS — I10 ESSENTIAL HYPERTENSION: ICD-10-CM

## 2025-04-11 PROCEDURE — 3075F SYST BP GE 130 - 139MM HG: CPT | Performed by: INTERNAL MEDICINE

## 2025-04-11 PROCEDURE — 3080F DIAST BP >= 90 MM HG: CPT | Performed by: INTERNAL MEDICINE

## 2025-04-11 PROCEDURE — 99214 OFFICE O/P EST MOD 30 MIN: CPT | Performed by: INTERNAL MEDICINE

## 2025-04-11 NOTE — PROGRESS NOTES
"Chief Complaint  Hypertension (6m Follow Up)    Subjective        Fanta Hawkins presents to [facility] CARDIOLOGY  History of present illness:    Patient states when she is walking she will become very weak feeling like her energy is draining.  She will note shortness of breath.  She states her pulmonologist felt she had some emphysema and COPD but \"it was not that bad.\"  She notes no real presyncope or syncope.      Past Medical History:   Diagnosis Date    Anemia 2025    Anxiety     Arthritis     Asthma     Chest pain     COPD (chronic obstructive pulmonary disease) 08/15/2022    Essential hypertension 2022    Injury of back     Mood disorder     S/P exploratory laparotomy with lysis of adhesions 2022         Past Surgical History:   Procedure Laterality Date    BACK SURGERY      rods and pins    BLADDER SURGERY      CARDIAC CATHETERIZATION N/A 2024    Procedure: Right Heart Cath;  Surgeon: Arnol Britt MD;  Location: [facility] CATH INVASIVE LOCATION;  Service: Cardiology;  Laterality: N/A;    COLONOSCOPY      ENDOSCOPY N/A 3/19/2025    Procedure: ESOPHAGOGASTRODUODENOSCOPY with biopsies;  Surgeon: Eduard Hernandez MD;  Location: [facility] ENDOSCOPY;  Service: Gastroenterology;  Laterality: N/A;  gastritis    EXPLORATORY LAPAROTOMY N/A 2012    repair of colon perforation after colonoscopy    EXPLORATORY LAPAROTOMY N/A 2022    Procedure: LAPAROTOMY EXPLORATORY;  Surgeon: Snehal Medina MD;  Location: [facility] OR [facility];  Service: General;  Laterality: N/A;    EYE SURGERY      HYSTERECTOMY            Social History     Socioeconomic History    Marital status:    Tobacco Use    Smoking status: Former     Current packs/day: 0.00     Average packs/day: 0.3 packs/day for 10.0 years (2.5 ttl pk-yrs)     Types: Cigarettes     Start date:      Quit date: 2022     Years since quittin.6     Passive exposure: Never    Smokeless tobacco: Never "   Vaping Use    Vaping status: Never Used   Substance and Sexual Activity    Alcohol use: Never    Drug use: Never    Sexual activity: Defer         Family History   Problem Relation Age of Onset    Emphysema Mother     COPD Mother     Malig Hyperthermia Neg Hx     Colon cancer Neg Hx           Allergies   Allergen Reactions    Indomethacin Palpitations and Shortness Of Breath    Meloxicam Itching and Shortness Of Breath     Other reaction(s): soa, itching    Moxifloxacin Seizure     Other reaction(s): seizures    Morphine Hives and Itching     Other reaction(s): hives            Current Outpatient Medications:     acetaminophen (TYLENOL) 650 MG 8 hr tablet, TAKE 2 TABLETS BY MOUTH EVERY 8 HOURS FOR 15 DAYS AS NEEDED, Disp: , Rfl:     albuterol sulfate HFA (Ventolin HFA) 108 (90 Base) MCG/ACT inhaler, Every 4 (Four) Hours., Disp: , Rfl:     busPIRone (BUSPAR) 10 MG tablet, Take 1 tablet by mouth 2 (Two) Times a Day As Needed., Disp: , Rfl:     carvedilol (COREG) 6.25 MG tablet, Take 1 tablet by mouth 2 (Two) Times a Day With Meals., Disp: 180 tablet, Rfl: 6    cetirizine (zyrTEC) 10 MG tablet, Take 1 tablet by mouth Daily., Disp: , Rfl:     diclofenac (VOLTAREN) 50 MG EC tablet, Take 1 tablet by mouth Daily., Disp: , Rfl:     fluconazole (DIFLUCAN) 150 MG tablet, Take one dose now, repeat in 7 days, Disp: 2 tablet, Rfl: 0    fluticasone (FLONASE) 50 MCG/ACT nasal spray, 1 spray by Each Nare route Daily As Needed., Disp: , Rfl:     gabapentin (NEURONTIN) 100 MG capsule, Take 1 capsule by mouth 4 (Four) Times a Day., Disp: , Rfl:     HYDROcodone-acetaminophen (NORCO) 5-325 MG per tablet, Take 1 tablet by mouth 3 (Three) Times a Day As Needed., Disp: , Rfl:     linaclotide (Linzess) 72 MCG capsule capsule, Take 1 capsule by mouth Every Morning Before Breakfast for 90 days., Disp: 90 capsule, Rfl: 0    meclizine (ANTIVERT) 25 MG tablet, Take 1 tablet by mouth Daily As Needed for Dizziness., Disp: , Rfl:     tiZANidine  "(ZANAFLEX) 4 MG tablet, Take 1 tablet by mouth Daily As Needed for Muscle Spasms., Disp: , Rfl:     docusate sodium 100 MG capsule, Take 1 capsule by mouth 2 (Two) Times a Day As Needed. (Patient not taking: Reported on 3/13/2025), Disp: , Rfl:       ROS:  Cardiac review of systems positive for dyspnea on exertion, exertional weakness    Objective     /94   Pulse 78   Ht 157.5 cm (62\")   Wt 52.2 kg (115 lb)   BMI 21.03 kg/m²       General Appearance:   well developed  well nourished  HENT:   oropharynx moist  lips not cyanotic  Respiratory:  no respiratory distress  normal breath sounds  no rales  Cardiovascular:  no jugular venous distention  regular rhythm  S1 normal, S2 normal  no S3, no S4   no murmur  no rub, no thrill  No carotid bruit  pedal pulses normal  lower extremity edema: none    Musculoskeletal:  no clubbing of fingers.   normocephalic, head atraumatic  Skin:   warm, dry  Psychiatric:  judgement and insight appropriate  normal mood and affect    ECHO:  Results for orders placed in visit on 10/13/23    Adult Transthoracic Echo Complete W/ Cont if Necessary Per Protocol    Interpretation Summary  Normal left ventricular systolic function.  Trace aortic regurgitation.  Trace MR.  Moderate tricuspid regurgitation with moderate pulmonary hypertension.  Estimated pulmonary artery systolic pressure around 45 to 50 mm.    STRESS:  Results for orders placed during the hospital encounter of 10/20/23    Stress Test With Myocardial Perfusion One Day    Interpretation Summary  Patient received Lexiscan 0.4 mg IV fusion over 10 seconds.  Baseline ECG showed normal sinus rhythm.  At peak stress, 1 mm horizontal ST depression was noted in the inferolateral leads.  No significant arrhythmias were noted.  Myocardial perfusion images demonstrate a small area of mild perfusion defect in the distal anterior segment without reversibility on resting images, more likely related to attenuation artifact.  The left " ventricle is normal in size with a calculated ejection fraction of 64%.  No wall motion abnormalities were noted.  No visual transient ischemic dilatation was noted.  Impressions are consistent with low risk myocardial perfusion study.    CATH:  Results for orders placed during the hospital encounter of 24    Cardiac Catheterization/Vascular Study    Conclusion  [facility]  CARDIAC CATHETERIZATION PROCEDURE REPORT    Patient: Fanta Hawkins  : 1950  MRN: [id number]  Procedure Date: 24    Referring Physician:  Eduard Priest    Interventional Cardiologist:  Arnol Britt MD    Indication:  Shortness of breath  Possible pulmonary hypertension with estimated RVSP greater than 50 mmHg by echocardiogram.    Clinical Presentation:  Patient is a 73-year-old female presenting with dyspnea on exertion.  She had a stress test that showed no obvious ischemia and normal ejection fraction.  She had an echocardiogram that showed normal ejection fraction and moderate tricuspid regurgitation with estimated RVSP greater than 50 mmHg.    Procedure performed:  Right brachial venous sheath placement  Right Heart Catheterization  Moderate conscious sedation  Manual pressure for venous hemostasis      Procedure Details:  Informed consent was obtained with an explanation of the risks, benefits and alternatives of the procedure. The patient was brought to the Cardiac Catheterization Laboratory in a fasting state.  The patient was prepped and draped in a standard, sterile fashion. Moderate sedation with Fentanyl and Versed was administered by the circulating nurse. Lidocaine 2% was used to anesthetize the right brachial region and access was obtained in the right brachial vein and a 7 [place] sheath was placed.  A 7 [place]angel[place] catheter was then advanced into the heart and out into the pulmonary artery.  Pressure tracings were obtained in the pulmonary wedge, pulmonary artery, right ventricle, and  right atrial positions.  Cardiac output was determined by thermodilution.  At the end of the procedure, the [place] catheter was removed.        Findings:      2. Hemodynamics:  Right Atrium: Mean 2 mmHg, RA sets 75%  Right Ventricle: 28/2 mmHg, RV sat 73%  Pulmonary Artery: 28/7 with a mean approximately 14 mmHg, PA sat 76%  Pulmonary Wedge: 7 mmHg  Cardiac Output/Index: By Alexi 5.6 L/min/3.7 L/min/m2.  By thermodilution 3.81 L/min / 2.52 L/min/m².    AO Sat: 99%            Patient tolerated the procedure well without any complications, and transferred to the post procedure area for recovery in a stable condition.    Cumulative fluoroscopy time: 3.8    Cumulative air kerma: 37.42    Total amount of contrast used: None      Complications:  None.    Estimated Blood Loss:  Minimal.    Conclusions:  Right heart filling pressures.  Normal pulmonary capillary wedge pressure.  No significant stepup in oxygen noted.    Recommendations:  Further workup for dyspnea on exertion to include possible pulmonary referral.      Arnol Britt MD    08/21/24  15:55 EDT    BMP:     Glucose   Date Value Ref Range Status   08/21/2024 95 65 - 99 mg/dL Final     BUN   Date Value Ref Range Status   08/21/2024 22 8 - 23 mg/dL Final     Creatinine   Date Value Ref Range Status   08/21/2024 0.80 0.57 - 1.00 mg/dL Final     Sodium   Date Value Ref Range Status   08/21/2024 139 136 - 145 mmol/L Final     Potassium   Date Value Ref Range Status   08/21/2024 3.8 3.5 - 5.2 mmol/L Final     Chloride   Date Value Ref Range Status   08/21/2024 104 98 - 107 mmol/L Final     CO2   Date Value Ref Range Status   08/21/2024 28.1 22.0 - 29.0 mmol/L Final     Calcium   Date Value Ref Range Status   08/21/2024 9.0 8.6 - 10.5 mg/dL Final     BUN/Creatinine Ratio   Date Value Ref Range Status   08/21/2024 27.5 (H) 7.0 - 25.0 Final     Anion Gap   Date Value Ref Range Status   08/21/2024 6.9 5.0 - 15.0 mmol/L Final     eGFR   Date Value Ref Range Status  "  08/21/2024 78.9 >60.0 mL/min/1.73 Final     LIPIDS:  No results found for: \"CHOL\", \"TRIG\", \"HDL\", \"LDL\", \"VLDL\", \"LDLHDL\"      Procedures             ASSESSMENT:  Diagnoses and all orders for this visit:    1. Shortness of breath (Primary)  -     Treadmill Stress Test; Future    2. Essential hypertension         PLAN:    1.  Patient had echocardiogram done back in October 2023 that showed normal ejection fraction with moderate tricuspid regurgitation.  Her RVSP was estimated at 45 to 50 mmHg but pulmonary asked for a right heart cath which showed completely normal right-sided filling pressures with a mean pulmonary artery pressure of 14 mmHg, pulmonary capillary wedge pressure of 7 mmHg.  2.  Asked the patient for a symptom limited, Maira protocol treadmill EKG just to see if we can reproduce the symptoms.  1 to see what the EKG looks like along with her blood pressure.  Also want to make sure she has chronotropic competence.  3.  Continue the Coreg.  4.  Patient will continue to follow with pulmonary.  5.  Patient did have a recent EGD which showed esophageal dysmotility, gastritis.      Return in about 2 months (around 6/11/2025) for asya rodriguez.     Patient was given instructions and counseling regarding her condition or for health maintenance advice. Please see specific information pulled into the AVS if appropriate.         Arnol Britt MD   4/11/2025  16:23 EDT  "

## 2025-04-25 ENCOUNTER — HOSPITAL ENCOUNTER (OUTPATIENT)
Facility: HOSPITAL | Age: 75
Discharge: HOME | End: 2025-04-25
Payer: MEDICARE

## 2025-04-25 VITALS — HEIGHT: 62 IN | BODY MASS INDEX: 21.35 KG/M2 | WEIGHT: 116 LBS

## 2025-04-25 DIAGNOSIS — R06.02 SHORTNESS OF BREATH: ICD-10-CM

## 2025-04-25 LAB
BH CV IMMEDIATE POST RECOVERY TECH DATA SYMPTOMS: NORMAL
BH CV IMMEDIATE POST TECH DATA BLOOD PRESSURE: NORMAL MMHG
BH CV IMMEDIATE POST TECH DATA HEART RATE: 101 BPM
BH CV IMMEDIATE POST TECH DATA OXYGEN SATS: 97 %
BH CV NINE MINUTE RECOVERY TECH DATA BLOOD PRESSURE: NORMAL MMHG
BH CV NINE MINUTE RECOVERY TECH DATA HEART RATE: 81 BPM
BH CV NINE MINUTE RECOVERY TECH DATA SYMPTOMS: NORMAL
BH CV SIX MINUTE RECOVERY TECH DATA BLOOD PRESSURE: NORMAL
BH CV SIX MINUTE RECOVERY TECH DATA HEART RATE: 78 BPM
BH CV SIX MINUTE RECOVERY TECH DATA SYMPTOMS: NORMAL
BH CV STRESS BP STAGE 1: NORMAL
BH CV STRESS DURATION MIN STAGE 1: 3
BH CV STRESS DURATION SEC STAGE 1: 0
BH CV STRESS DURATION SEC STAGE 2: 30
BH CV STRESS GRADE STAGE 1: 0
BH CV STRESS GRADE STAGE 2: 0
BH CV STRESS HR STAGE 1: 119
BH CV STRESS HR STAGE 2: 119
BH CV STRESS METS STAGE 1: 5
BH CV STRESS O2 STAGE 1: 98
BH CV STRESS O2 STAGE 2: 98
BH CV STRESS PROTOCOL 1: NORMAL
BH CV STRESS RECOVERY BP: NORMAL MMHG
BH CV STRESS RECOVERY HR: 74 BPM
BH CV STRESS RECOVERY O2: 97 %
BH CV STRESS SPEED STAGE 1: 1
BH CV STRESS SPEED STAGE 2: 2
BH CV STRESS STAGE 1: 1
BH CV STRESS STAGE 2: 2
BH CV THREE MINUTE POST TECH DATA BLOOD PRESSURE: NORMAL MMHG
BH CV THREE MINUTE POST TECH DATA HEART RATE: 79 BPM
BH CV THREE MINUTE POST TECH DATA OXYGEN SATURATION: 97 %
BH CV THREE MINUTE RECOVERY TECH DATA SYMPTOM: NORMAL
BH CV TWELVE MINUTE RECOVERY TECH DATA BLOOD PRESSURE: NORMAL MMHG
BH CV TWELVE MINUTE RECOVERY TECH DATA HEART RATE: 74 BPM
BH CV TWELVE MINUTE RECOVERY TECH DATA SYMPTOMS: NORMAL
MAXIMAL PREDICTED HEART RATE: 148 BPM
PERCENT MAX PREDICTED HR: 80.41 %
STRESS BASELINE BP: NORMAL MMHG
STRESS BASELINE HR: 90 BPM
STRESS O2 SAT REST: 98 %
STRESS PERCENT HR: 95 %
STRESS POST ESTIMATED WORKLOAD: 2.5 METS
STRESS POST EXERCISE DUR MIN: 3 MIN
STRESS POST EXERCISE DUR SEC: 28 SEC
STRESS POST O2 SAT PEAK: 98 %
STRESS POST PEAK BP: NORMAL MMHG
STRESS POST PEAK HR: 119 BPM
STRESS TARGET HR: 126 BPM

## 2025-04-25 PROCEDURE — 93017 CV STRESS TEST TRACING ONLY: CPT

## 2025-04-28 ENCOUNTER — RESULTS FOLLOW-UP (OUTPATIENT)
Dept: CARDIOLOGY | Facility: CLINIC | Age: 75
End: 2025-04-28
Payer: MEDICARE

## 2025-04-28 NOTE — TELEPHONE ENCOUNTER
Medication(s) to Refill:   Requested Prescriptions     Pending Prescriptions Disp Refills    ALBUTEROL 108 (90 Base) MCG/ACT Inhalation Aero Soln [Pharmacy Med Name: ALBUTEROL HFA (PROAIR) INHALER] 8.5 each 0     Sig: TAKE 2 PUFFS BY MOUTH EVERY 6 HOURS AS NEEDED FOR WHEEZE OR SHORTNESS OF BREATH     Last Time Medication was Filled:  3/7/23    Recent Visits  Date Type Provider Dept   01/25/23 Office Visit DO Juwan Pendleton [address] [place] [address]     Future Appointments  No visits were found SW patient, went over need to schedule f/u with provider to discuss in detail. Patient comes to [place] on Fridays as her friend brings her. Will ask provider for guidance.

## 2025-05-16 ENCOUNTER — OFFICE VISIT (OUTPATIENT)
Dept: CARDIOLOGY | Facility: CLINIC | Age: 75
End: 2025-05-16
Payer: MEDICARE

## 2025-05-16 VITALS
SYSTOLIC BLOOD PRESSURE: 147 MMHG | HEIGHT: 62 IN | BODY MASS INDEX: 21.23 KG/M2 | WEIGHT: 115.4 LBS | DIASTOLIC BLOOD PRESSURE: 99 MMHG | HEART RATE: 78 BPM

## 2025-05-16 DIAGNOSIS — R06.02 SHORTNESS OF BREATH: ICD-10-CM

## 2025-05-16 DIAGNOSIS — I10 ESSENTIAL HYPERTENSION: Primary | ICD-10-CM

## 2025-05-16 RX ORDER — LOSARTAN POTASSIUM 25 MG/1
25 TABLET ORAL DAILY
Qty: 90 TABLET | Refills: 3 | Status: SHIPPED | OUTPATIENT
Start: 2025-05-16

## 2025-05-16 NOTE — ASSESSMENT & PLAN NOTE
Patient's blood pressure is elevated.  She was advised by her PCP not to take any blood pressure medication unless her diastolic blood pressure was greater than 110.  I recommend that she start losartan 25 mg daily for better blood pressure control.

## 2025-05-16 NOTE — ASSESSMENT & PLAN NOTE
Patient's shortness of breath is nonspecific.  May be related to just activity intolerance however cannot rule out anginal equivalent.  Recent treadmill stress test shows fair chronotropic competence for low level exercise.  Her blood pressure became very hypertensive.  No obvious ischemia is noted but less sensitive due to baseline EKG changes.  Only other option would be to possibly consider left heart cath. Risk and benefits were discussed and patient wants to give it some consideration before agreeing to it.

## 2025-05-16 NOTE — PROGRESS NOTES
Chief Complaint  Follow-up (Abnormal Stress Test/), Shortness of Breath, and Hypertension    Subjective        History of Present Illness  Fanta Hawkins presents to [facility] CARDIOLOGY for follow up.   Patient is a 74-year-old female with past medical history outlined below, significant for hypertension who presents for follow-up.  She has exertional fatigue and dyspnea on exertion.  She denies chest pain or discomfort.  She has no palpitations or edema.  She is following up on a recent treadmill stress test.    Past Medical History:   Diagnosis Date    Anemia 2/20/2025    Anxiety     Arthritis     Asthma     Chest pain     COPD (chronic obstructive pulmonary disease) 08/15/2022    Essential hypertension 08/24/2022    Injury of back     Mood disorder     S/P exploratory laparotomy with lysis of adhesions 08/22/2022       ALLERGY  Allergies   Allergen Reactions    Indomethacin Palpitations and Shortness Of Breath    Meloxicam Itching and Shortness Of Breath     Other reaction(s): soa, itching    Moxifloxacin Seizure     Other reaction(s): seizures    Morphine Hives and Itching     Other reaction(s): hives        Past Surgical History:   Procedure Laterality Date    BACK SURGERY      rods and pins    BLADDER SURGERY      CARDIAC CATHETERIZATION N/A 8/21/2024    Procedure: Right Heart Cath;  Surgeon: Arnol Britt MD;  Location: [facility] CATH INVASIVE LOCATION;  Service: Cardiology;  Laterality: N/A;    COLONOSCOPY      ENDOSCOPY N/A 3/19/2025    Procedure: ESOPHAGOGASTRODUODENOSCOPY with biopsies;  Surgeon: Eduard Hernandez MD;  Location: [facility] ENDOSCOPY;  Service: Gastroenterology;  Laterality: N/A;  gastritis    EXPLORATORY LAPAROTOMY N/A 03/19/2012    repair of colon perforation after colonoscopy    EXPLORATORY LAPAROTOMY N/A 08/19/2022    Procedure: LAPAROTOMY EXPLORATORY;  Surgeon: Snehal Medina MD;  Location: [facility] OR [facility];  Service: General;  Laterality: N/A;    EYE  SURGERY      HYSTERECTOMY          Social History     Socioeconomic History    Marital status:    Tobacco Use    Smoking status: Former     Current packs/day: 0.00     Average packs/day: 0.3 packs/day for 10.0 years (2.5 ttl pk-yrs)     Types: Cigarettes     Start date:      Quit date: 2022     Years since quittin.7     Passive exposure: Never    Smokeless tobacco: Never   Vaping Use    Vaping status: Never Used   Substance and Sexual Activity    Alcohol use: Never    Drug use: Never    Sexual activity: Defer       Family History   Problem Relation Age of Onset    Emphysema Mother     COPD Mother     Malig Hyperthermia Neg Hx     Colon cancer Neg Hx         Current Outpatient Medications on File Prior to Visit   Medication Sig    acetaminophen (TYLENOL) 650 MG 8 hr tablet TAKE 2 TABLETS BY MOUTH EVERY 8 HOURS FOR 15 DAYS AS NEEDED    albuterol sulfate HFA (Ventolin HFA) 108 (90 Base) MCG/ACT inhaler Every 4 (Four) Hours.    carvedilol (COREG) 6.25 MG tablet Take 1 tablet by mouth 2 (Two) Times a Day With Meals.    cetirizine (zyrTEC) 10 MG tablet Take 1 tablet by mouth Daily.    fluticasone (FLONASE) 50 MCG/ACT nasal spray 1 spray by Each Nare route Daily As Needed.    gabapentin (NEURONTIN) 100 MG capsule Take 1 capsule by mouth 4 (Four) Times a Day.    HYDROcodone-acetaminophen (NORCO) 5-325 MG per tablet Take 1 tablet by mouth 3 (Three) Times a Day As Needed.    linaclotide (Linzess) 72 MCG capsule capsule Take 1 capsule by mouth Every Morning Before Breakfast for 90 days.    meclizine (ANTIVERT) 25 MG tablet Take 1 tablet by mouth Daily As Needed for Dizziness.    tiZANidine (ZANAFLEX) 4 MG tablet Take 1 tablet by mouth Daily As Needed for Muscle Spasms.    busPIRone (BUSPAR) 10 MG tablet Take 1 tablet by mouth 2 (Two) Times a Day As Needed. (Patient not taking: Reported on 2025)    diclofenac (VOLTAREN) 50 MG EC tablet Take 1 tablet by mouth Daily. (Patient not taking: Reported on  "5/16/2025)    docusate sodium 100 MG capsule Take 1 capsule by mouth 2 (Two) Times a Day As Needed. (Patient not taking: Reported on 3/13/2025)    fluconazole (DIFLUCAN) 150 MG tablet Take one dose now, repeat in 7 days (Patient not taking: Reported on 5/16/2025)     No current facility-administered medications on file prior to visit.       Objective   Vitals:    05/16/25 0823   BP: 147/99   BP Location: Left arm   Patient Position: Sitting   Cuff Size: Small Adult   Pulse: 78   Weight: 52.3 kg (115 lb 6.4 oz)   Height: 157.5 cm (62.01\")       Physical Exam  Constitutional:       General: She is awake. She is not in acute distress.     Appearance: Normal appearance.   HENT:      Head: Normocephalic.      Nose: Nose normal. No congestion.   Eyes:      Extraocular Movements: Extraocular movements intact.      Conjunctiva/sclera: Conjunctivae normal.      Pupils: Pupils are equal, round, and reactive to light.   Neck:      Thyroid: No thyromegaly.      Vascular: No JVD.   Cardiovascular:      Rate and Rhythm: Normal rate and regular rhythm.      Chest Wall: PMI is not displaced.      Pulses: Normal pulses.      Heart sounds: Normal heart sounds, S1 normal and S2 normal. No murmur heard.     No friction rub. No gallop. No S3 or S4 sounds.   Pulmonary:      Effort: Pulmonary effort is normal.      Breath sounds: Normal breath sounds. No wheezing, rhonchi or rales.   Abdominal:      General: Bowel sounds are normal.      Palpations: Abdomen is soft.      Tenderness: There is no abdominal tenderness.   Musculoskeletal:      Cervical back: No tenderness.      Right lower leg: No edema.      Left lower leg: No edema.   Lymphadenopathy:      Cervical: No cervical adenopathy.   Skin:     General: Skin is warm and dry.      Capillary Refill: Capillary refill takes less than 2 seconds.      Coloration: Skin is not cyanotic.      Findings: No petechiae or rash.      Nails: There is no clubbing.   Neurological:      Mental Status: " She is alert.   Psychiatric:         Mood and Affect: Mood normal.         Behavior: Behavior is cooperative.           Result Review     The following data was reviewed by JIN Silverman on 05/16/25.      CMP          8/21/2024    08:09   CMP   Glucose 95    BUN 22    Creatinine 0.80    EGFR 78.9    Sodium 139    Potassium 3.8    Chloride 104    Calcium 9.0    BUN/Creatinine Ratio 27.5    Anion Gap 6.9      CBC w/diff          8/21/2024    08:09 8/21/2024    13:09 8/21/2024    13:16 8/21/2024    13:20   CBC w/Diff   WBC 4.32       RBC 3.46       Hemoglobin 11.0  10.5  10.2  9.2    Hematocrit 33.9  31  30  27    MCV 98.0       MCH 31.8       MCHC 32.4       RDW 12.5       Platelets 247              Results for orders placed in visit on 10/13/23    Adult Transthoracic Echo Complete W/ Cont if Necessary Per Protocol    Interpretation Summary  Normal left ventricular systolic function.  Trace aortic regurgitation.  Trace MR.  Moderate tricuspid regurgitation with moderate pulmonary hypertension.  Estimated pulmonary artery systolic pressure around 45 to 50 mm.    Results for orders placed during the hospital encounter of 04/25/25    Treadmill Stress Test    Interpretation Summary    Blood pressure demonstrated a hypertensive response to stress.    Patient was only able to walk 3 minutes and 28 seconds on modified Xavier protocol achieving 2.53 metabolic equivalents.  Her heart rate increased from 78 bpm up to 119 bpm which was 80% of the maximum predicted heart rate.    Resting electrocardiogram showed normal sinus rhythm with nonspecific ST-T wave changes.  Stress electrocardiogram showed no obvious significant change in her ST segments.    Patient became very weak and tired and almost not able to continue at 30 seconds on the treadmill.    Treadmill EKG shows fair chronotropic competence for low level exercise.  Her blood pressure became very hypertensive.  No obvious ischemia is noted but less sensitive due  to baseline EKG changes.  Only other option would be to possibly consider left heart cath.    No results found for this or any previous visit.          Procedures      Assessment & Plan  Essential hypertension  Patient's blood pressure is elevated.  She was advised by her PCP not to take any blood pressure medication unless her diastolic blood pressure was greater than 110.  I recommend that she start losartan 25 mg daily for better blood pressure control.  Shortness of breath  Patient's shortness of breath is nonspecific.  May be related to just activity intolerance however cannot rule out anginal equivalent.  Recent treadmill stress test shows fair chronotropic competence for low level exercise.  Her blood pressure became very hypertensive.  No obvious ischemia is noted but less sensitive due to baseline EKG changes.  Only other option would be to possibly consider left heart cath. Risk and benefits were discussed and patient wants to give it some consideration before agreeing to it.                     Follow Up   Return in about 3 months (around 8/16/2025) for With JIN Camilo.    Patient was given instructions and counseling regarding her condition or for health maintenance advice. Please see specific information pulled into the AVS if appropriate.     JIN Silverman  05/16/25  09:16 EDT    Dictated Utilizing Dragon Dictation

## 2025-05-17 ENCOUNTER — APPOINTMENT (OUTPATIENT)
Dept: CT IMAGING | Facility: HOSPITAL | Age: 75
End: 2025-05-17
Payer: MEDICARE

## 2025-05-17 ENCOUNTER — HOSPITAL ENCOUNTER (EMERGENCY)
Facility: HOSPITAL | Age: 75
Discharge: HOME | End: 2025-05-17
Attending: EMERGENCY MEDICINE
Payer: MEDICARE

## 2025-05-17 VITALS
BODY MASS INDEX: 21.54 KG/M2 | RESPIRATION RATE: 16 BRPM | HEIGHT: 62 IN | WEIGHT: 117.06 LBS | HEART RATE: 82 BPM | DIASTOLIC BLOOD PRESSURE: 61 MMHG | OXYGEN SATURATION: 96 % | SYSTOLIC BLOOD PRESSURE: 104 MMHG | TEMPERATURE: 98 F

## 2025-05-17 DIAGNOSIS — G43.909 MIGRAINE WITHOUT STATUS MIGRAINOSUS, NOT INTRACTABLE, UNSPECIFIED MIGRAINE TYPE: Primary | ICD-10-CM

## 2025-05-17 LAB
ALBUMIN SERPL-MCNC: 3.6 G/DL (ref 3.5–5.2)
ALBUMIN/GLOB SERPL: 0.9 G/DL
ALP SERPL-CCNC: 96 U/L (ref 39–117)
ALT SERPL W P-5'-P-CCNC: 6 U/L (ref 1–33)
ANION GAP SERPL CALCULATED.3IONS-SCNC: 9.4 MMOL/L (ref 5–15)
AST SERPL-CCNC: 12 U/L (ref 1–32)
BASOPHILS # BLD AUTO: 0.04 10*3/MM3 (ref 0–0.2)
BASOPHILS NFR BLD AUTO: 0.8 % (ref 0–1.5)
BILIRUB SERPL-MCNC: 0.3 MG/DL (ref 0–1.2)
BUN SERPL-MCNC: 16 MG/DL (ref 8–23)
BUN/CREAT SERPL: 19.8 (ref 7–25)
CALCIUM SPEC-SCNC: 9.4 MG/DL (ref 8.6–10.5)
CHLORIDE SERPL-SCNC: 102 MMOL/L (ref 98–107)
CO2 SERPL-SCNC: 25.6 MMOL/L (ref 22–29)
CREAT SERPL-MCNC: 0.81 MG/DL (ref 0.57–1)
DEPRECATED RDW RBC AUTO: 44.9 FL (ref 37–54)
EGFRCR SERPLBLD CKD-EPI 2021: 77.2 ML/MIN/1.73
EOSINOPHIL # BLD AUTO: 0.1 10*3/MM3 (ref 0–0.4)
EOSINOPHIL NFR BLD AUTO: 1.9 % (ref 0.3–6.2)
ERYTHROCYTE [DISTWIDTH] IN BLOOD BY AUTOMATED COUNT: 12.4 % (ref 12.3–15.4)
GLOBULIN UR ELPH-MCNC: 3.8 GM/DL
GLUCOSE SERPL-MCNC: 134 MG/DL (ref 65–99)
HCT VFR BLD AUTO: 33.9 % (ref 34–46.6)
HGB BLD-MCNC: 10.9 G/DL (ref 12–15.9)
HOLD SPECIMEN: NORMAL
HOLD SPECIMEN: NORMAL
IMM GRANULOCYTES # BLD AUTO: 0.01 10*3/MM3 (ref 0–0.05)
IMM GRANULOCYTES NFR BLD AUTO: 0.2 % (ref 0–0.5)
LYMPHOCYTES # BLD AUTO: 1.11 10*3/MM3 (ref 0.7–3.1)
LYMPHOCYTES NFR BLD AUTO: 21.5 % (ref 19.6–45.3)
MAGNESIUM SERPL-MCNC: 2.3 MG/DL (ref 1.6–2.4)
MCH RBC QN AUTO: 32.1 PG (ref 26.6–33)
MCHC RBC AUTO-ENTMCNC: 32.2 G/DL (ref 31.5–35.7)
MCV RBC AUTO: 99.7 FL (ref 79–97)
MONOCYTES # BLD AUTO: 0.47 10*3/MM3 (ref 0.1–0.9)
MONOCYTES NFR BLD AUTO: 9.1 % (ref 5–12)
NEUTROPHILS NFR BLD AUTO: 3.44 10*3/MM3 (ref 1.7–7)
NEUTROPHILS NFR BLD AUTO: 66.5 % (ref 42.7–76)
NRBC BLD AUTO-RTO: 0 /100 WBC (ref 0–0.2)
PLATELET # BLD AUTO: 285 10*3/MM3 (ref 140–450)
PMV BLD AUTO: 9.1 FL (ref 6–12)
POTASSIUM SERPL-SCNC: 3.9 MMOL/L (ref 3.5–5.2)
PROT SERPL-MCNC: 7.4 G/DL (ref 6–8.5)
RBC # BLD AUTO: 3.4 10*6/MM3 (ref 3.77–5.28)
SODIUM SERPL-SCNC: 137 MMOL/L (ref 136–145)
WBC NRBC COR # BLD AUTO: 5.17 10*3/MM3 (ref 3.4–10.8)
WHOLE BLOOD HOLD COAG: NORMAL
WHOLE BLOOD HOLD SPECIMEN: NORMAL

## 2025-05-17 PROCEDURE — 85025 COMPLETE CBC W/AUTO DIFF WBC: CPT

## 2025-05-17 PROCEDURE — 99284 EMERGENCY DEPT VISIT MOD MDM: CPT

## 2025-05-17 PROCEDURE — 25010000002 DEXAMETHASONE SODIUM PHOSPHATE 10 MG/ML SOLUTION

## 2025-05-17 PROCEDURE — 96375 TX/PRO/DX INJ NEW DRUG ADDON: CPT

## 2025-05-17 PROCEDURE — 70450 CT HEAD/BRAIN W/O DYE: CPT

## 2025-05-17 PROCEDURE — 96374 THER/PROPH/DIAG INJ IV PUSH: CPT

## 2025-05-17 PROCEDURE — 25010000002 METOCLOPRAMIDE PER 10 MG

## 2025-05-17 PROCEDURE — 80053 COMPREHEN METABOLIC PANEL: CPT

## 2025-05-17 PROCEDURE — 83735 ASSAY OF MAGNESIUM: CPT

## 2025-05-17 RX ORDER — METOCLOPRAMIDE HYDROCHLORIDE 5 MG/ML
10 INJECTION INTRAMUSCULAR; INTRAVENOUS ONCE
Status: COMPLETED | OUTPATIENT
Start: 2025-05-17 | End: 2025-05-17

## 2025-05-17 RX ORDER — DEXAMETHASONE SODIUM PHOSPHATE 10 MG/ML
10 INJECTION, SOLUTION INTRAMUSCULAR; INTRAVENOUS ONCE
Status: COMPLETED | OUTPATIENT
Start: 2025-05-17 | End: 2025-05-17

## 2025-05-17 RX ORDER — ACETAMINOPHEN 500 MG
1000 TABLET ORAL ONCE
Status: COMPLETED | OUTPATIENT
Start: 2025-05-17 | End: 2025-05-17

## 2025-05-17 RX ORDER — ONDANSETRON 4 MG/1
4 TABLET, ORALLY DISINTEGRATING ORAL 4 TIMES DAILY PRN
Qty: 20 TABLET | Refills: 0 | Status: SHIPPED | OUTPATIENT
Start: 2025-05-17

## 2025-05-17 RX ORDER — ONDANSETRON 4 MG/1
4 TABLET, ORALLY DISINTEGRATING ORAL 4 TIMES DAILY PRN
Qty: 20 TABLET | Refills: 0 | Status: SHIPPED | OUTPATIENT
Start: 2025-05-17 | End: 2025-05-17

## 2025-05-17 RX ADMIN — METOCLOPRAMIDE 10 MG: 5 INJECTION, SOLUTION INTRAMUSCULAR; INTRAVENOUS at 19:10

## 2025-05-17 RX ADMIN — DEXAMETHASONE SODIUM PHOSPHATE 10 MG: 10 INJECTION INTRAMUSCULAR; INTRAVENOUS at 19:09

## 2025-05-17 RX ADMIN — ACETAMINOPHEN 1000 MG: 500 TABLET ORAL at 19:10

## 2025-05-17 NOTE — DISCHARGE INSTRUCTIONS
Thank you for allowing us to provide care for you today.  Your workup today revealed evidence of migraine headache. You have been prescribed Zofran for nausea and vomiting management.  Recommend that you begin taking Tylenol as needed up to 3000 mg a day for headache management.  I recommend that you follow-up with your primary care provider in the next 7 days related to this ED event. Thank you

## 2025-05-17 NOTE — ED PROVIDER NOTES
Time: 6:35 PM EDT  Date of encounter:  5/17/2025  Independent Historian/Clinical History and Information was obtained by:   Patient    History is limited by: N/A    Chief Complaint: Headache x 1 day      History of Present Illness:  Patient is a 74 y.o. year old female who presents to the emergency department for evaluation of headache x 1 day.  Patient has paramedical history significant for essential hypertension, COPD.  Patient reports she has been having a headache noon today.  The pain radiates up the side of her right trap into her neck and around into her head.  Denies acute change in vision.  Has a history of cataract surgery with lens replacement.  Patient family bedside denies any aphasia or appreciable facial drooping.  Denies LOC.  Denies anticoagulation.      Patient Care Team  Primary Care Provider: Eduard Minaya MD    Past Medical History:     Allergies   Allergen Reactions    Indomethacin Palpitations and Shortness Of Breath    Meloxicam Itching and Shortness Of Breath     Other reaction(s): soa, itching    Moxifloxacin Seizure     Other reaction(s): seizures    Morphine Hives and Itching     Other reaction(s): hives     Past Medical History:   Diagnosis Date    Anemia 2/20/2025    Anxiety     Arthritis     Asthma     Chest pain     COPD (chronic obstructive pulmonary disease) 08/15/2022    Essential hypertension 08/24/2022    Injury of back     Mood disorder     S/P exploratory laparotomy with lysis of adhesions 08/22/2022     Past Surgical History:   Procedure Laterality Date    BACK SURGERY      rods and pins    BLADDER SURGERY      CARDIAC CATHETERIZATION N/A 8/21/2024    Procedure: Right Heart Cath;  Surgeon: Arnol Britt MD;  Location: [facility] CATH INVASIVE LOCATION;  Service: Cardiology;  Laterality: N/A;    COLONOSCOPY      ENDOSCOPY N/A 3/19/2025    Procedure: ESOPHAGOGASTRODUODENOSCOPY with biopsies;  Surgeon: Eduard Hernandez MD;  Location: [facility] ENDOSCOPY;  Service:  Gastroenterology;  Laterality: N/A;  gastritis    EXPLORATORY LAPAROTOMY N/A 03/19/2012    repair of colon perforation after colonoscopy    EXPLORATORY LAPAROTOMY N/A 08/19/2022    Procedure: LAPAROTOMY EXPLORATORY;  Surgeon: Snehal Medina MD;  [place] [facility] [place] [facility] [place]  Service: General;  Laterality: N/A;    EYE SURGERY      HYSTERECTOMY       Family History   Problem Relation Age of Onset    Emphysema Mother     COPD Mother     Malig Hyperthermia Neg Hx     Colon cancer Neg Hx        Home Medications:  Prior to Admission medications    Medication Sig Start Date End Date Taking? Authorizing Provider   acetaminophen (TYLENOL) 650 MG 8 hr tablet TAKE 2 TABLETS BY MOUTH EVERY 8 HOURS FOR 15 DAYS AS NEEDED 10/2/24   Senia Garcia MD   albuterol sulfate HFA (Ventolin HFA) 108 (90 Base) MCG/ACT inhaler Every 4 (Four) Hours.    Senia Garcia MD   busPIRone (BUSPAR) 10 MG tablet Take 1 tablet by mouth 2 (Two) Times a Day As Needed.  Patient not taking: Reported on 5/16/2025 6/24/22   Senia Garcia MD   carvedilol (COREG) 6.25 MG tablet Take 1 tablet by mouth 2 (Two) Times a Day With Meals. 9/15/23   Eric Gaston MD   cetirizine (zyrTEC) 10 MG tablet Take 1 tablet by mouth Daily. 10/21/24   Senia Garcia MD   diclofenac (VOLTAREN) 50 MG EC tablet Take 1 tablet by mouth Daily.  Patient not taking: Reported on 5/16/2025    Senia Garcia MD   docusate sodium 100 MG capsule Take 1 capsule by mouth 2 (Two) Times a Day As Needed.  Patient not taking: Reported on 3/13/2025    Senia Garcia MD   fluconazole (DIFLUCAN) 150 MG tablet Take one dose now, repeat in 7 days  Patient not taking: Reported on 5/16/2025 3/7/25   Rohini Delgado APRN   fluticasone (FLONASE) 50 MCG/ACT nasal spray 1 spray by Each Nare route Daily As Needed.    Senia Garcia MD   gabapentin (NEURONTIN) 100 MG capsule Take 1 capsule by mouth 4 (Four) Times a Day. 3/11/25   Jose  "MD Senia   HYDROcodone-acetaminophen (NORCO) 5-325 MG per tablet Take 1 tablet by mouth 3 (Three) Times a Day As Needed. 10/14/21   ProviderSenia MD   linaclotide (Linzess) 72 MCG capsule capsule Take 1 capsule by mouth Every Morning Before Breakfast for 90 days. 25  Carmencita Arevalo APRN   losartan (Cozaar) 25 MG tablet Take 1 tablet by mouth Daily. 25   Inge Ivey APRN   meclizine (ANTIVERT) 25 MG tablet Take 1 tablet by mouth Daily As Needed for Dizziness.    ProviderSenia MD   tiZANidine (ZANAFLEX) 4 MG tablet Take 1 tablet by mouth Daily As Needed for Muscle Spasms.    ProviderSenia MD        Social History:   Social History     Tobacco Use    Smoking status: Former     Current packs/day: 0.00     Average packs/day: 0.3 packs/day for 10.0 years (2.5 ttl pk-yrs)     Types: Cigarettes     Start date:      Quit date: 2022     Years since quittin.7     Passive exposure: Never    Smokeless tobacco: Never   Vaping Use    Vaping status: Never Used   Substance Use Topics    Alcohol use: Never    Drug use: Never         Review of Systems:  Review of Systems     Physical Exam:  /61 (BP Location: Right arm, Patient Position: Sitting)   Pulse 82   Temp 98 °F (36.7 °C) (Oral)   Resp 16   Ht 157.5 cm (62\")   Wt 53.1 kg (117 lb 1 oz)   SpO2 96%   BMI 21.41 kg/m²     Physical Exam  Vitals and nursing note reviewed.   Constitutional:       General: She is not in acute distress.     Appearance: She is not ill-appearing.   HENT:      Head: Normocephalic.      Mouth/Throat:      Mouth: Mucous membranes are moist.   Eyes:      Pupils: Pupils are equal, round, and reactive to light.   Neck:      Meningeal: Brudzinski's sign and Kernig's sign absent.   Cardiovascular:      Pulses:           Carotid pulses are 2+ on the right side and 2+ on the left side.     Comments: No evidence of carotid bruit  Pulmonary:      Effort: Pulmonary effort " is normal.   Abdominal:      General: There is no distension.      Tenderness: There is no abdominal tenderness. There is no guarding.   Musculoskeletal:      Cervical back: Normal range of motion and neck supple.      Right lower leg: No edema.      Left lower leg: No edema.   Skin:     General: Skin is warm and dry.   Neurological:      General: No focal deficit present.      Mental Status: She is alert and oriented to person, place, and time. Mental status is at baseline.      Cranial Nerves: No cranial nerve deficit.   Psychiatric:         Mood and Affect: Mood normal.         Behavior: Behavior normal.                    Medical Decision Making:      Comorbidities that affect care:    Hypertension    External Notes reviewed:    Previous Clinic Note: Previous clinic note on 5/6/2025 reviewed      The following orders were placed and all results were independently analyzed by me:  Orders Placed This Encounter   Procedures    CT Head Without Contrast    [place] Draw    Comprehensive Metabolic Panel    Magnesium    CBC Auto Differential    CBC & Differential    Green Top (Gel)    Lavender Top    Gold Top - SST    Light Blue Top       Medications Given in the Emergency Department:  Medications   dexAMETHasone sodium phosphate injection 10 mg (10 mg Intravenous Given 5/17/25 1909)   acetaminophen (TYLENOL) tablet 1,000 mg (1,000 mg Oral Given 5/17/25 1910)   metoclopramide (REGLAN) injection 10 mg (10 mg Intravenous Given 5/17/25 1910)        ED Course:         Labs:    Lab Results (last 24 hours)       Procedure Component Value Units Date/Time    CBC & Differential [[id number]]  (Abnormal) Collected: 05/17/25 1909    Specimen: Blood Updated: 05/17/25 1922    Narrative:      The following orders were created for panel order CBC & Differential.  Procedure                               Abnormality         Status                     ---------                               -----------         ------                      CBC Auto Differential[[id number]]        Abnormal            Final result                 Please view results for these tests on the individual orders.    Comprehensive Metabolic Panel [[id number]]  (Abnormal) Collected: 05/17/25 1909    Specimen: Blood Updated: 05/17/25 1937     Glucose 134 mg/dL      BUN 16 mg/dL      Creatinine 0.81 mg/dL      Sodium 137 mmol/L      Potassium 3.9 mmol/L      Chloride 102 mmol/L      CO2 25.6 mmol/L      Calcium 9.4 mg/dL      Total Protein 7.4 g/dL      Albumin 3.6 g/dL      ALT (SGPT) 6 U/L      AST (SGOT) 12 U/L      Alkaline Phosphatase 96 U/L      Total Bilirubin 0.3 mg/dL      Globulin 3.8 gm/dL      A/G Ratio 0.9 g/dL      BUN/Creatinine Ratio 19.8     Anion Gap 9.4 mmol/L      eGFR 77.2 mL/min/1.73     Narrative:      GFR Categories in Chronic Kidney Disease (CKD)              GFR Category          GFR (mL/min/1.73)    Interpretation  G1                    90 or greater        Normal or high (1)  G2                    60-89                Mild decrease (1)  G3a                   45-59                Mild to moderate decrease  G3b                   30-44                Moderate to severe decrease  G4                    15-29                Severe decrease  G5                    14 or less           Kidney failure    (1)In the absence of evidence of kidney disease, neither GFR category G1 or G2 fulfill the criteria for CKD.    eGFR calculation 2021 CKD-EPI creatinine equation, which does not include race as a factor    Magnesium [[id number]]  (Normal) Collected: 05/17/25 1909    Specimen: Blood Updated: 05/17/25 1938     Magnesium 2.3 mg/dL     CBC Auto Differential [[id number]]  (Abnormal) Collected: 05/17/25 1909    Specimen: Blood Updated: 05/17/25 1922     WBC 5.17 10*3/mm3      RBC 3.40 10*6/mm3      Hemoglobin 10.9 g/dL      Hematocrit 33.9 %      MCV 99.7 fL      MCH 32.1 pg      MCHC 32.2 g/dL      RDW 12.4 %      RDW-SD 44.9 fl      MPV 9.1 fL      Platelets 285  10*3/mm3      Neutrophil % 66.5 %      Lymphocyte % 21.5 %      Monocyte % 9.1 %      Eosinophil % 1.9 %      Basophil % 0.8 %      Immature Grans % 0.2 %      Neutrophils, Absolute 3.44 10*3/mm3      Lymphocytes, Absolute 1.11 10*3/mm3      Monocytes, Absolute 0.47 10*3/mm3      Eosinophils, Absolute 0.10 10*3/mm3      Basophils, Absolute 0.04 10*3/mm3      Immature Grans, Absolute 0.01 10*3/mm3      nRBC 0.0 /100 WBC              Imaging:    CT Head Without Contrast  Result Date: 5/17/2025  CT HEAD WO CONTRAST Date of exam: 5/17/2025 7:25 PM EDT. Indication: HA (headache). Comparisons: 4/18/2023; 4/17/2023; 1/20/2014. TECHNIQUE: Axial CT images were obtained of the head without contrast administration. Reconstructed 2D coronal and sagittal images were also obtained. Automated exposure control and iterative construction methods were used. Streak artifacts obscure detail.  FINDINGS: A routine nonenhanced head CT was performed. No acute brain abnormality is seen. No acute infarct. No acute intracranial hemorrhage. No midline shift or acute intracranial mass effect is seen. The ventricular size and configuration are within normal limits. No acute skull fracture is identified. There may be mild chronic small vessel disease. The patient has undergone bilateral cataract extractions with intra-ocular lens implants. Degenerative changes involve the partially imaged cervical spine and the bilateral temporomandibular joints (TMJs). There is chronic leftward nasal septal deviation. Mild age-indeterminate mucosal thickening and/or retained secretions involve(s) the imaged paranasal sinuses. No air-fluid interfaces are seen within the imaged paranasal sinuses. There is a possible odontogenic origin of the left maxillary sinus disease.     No acute brain abnormality is seen. No acute infarct. No acute intracranial hemorrhage. Please see above comments for further detail.    Portions of this note were completed with a voice  recognition program. Electronically Signed: Des Macedo MD  5/17/2025 7:40 PM EDT  Workstation ID: [id number]        Differential Diagnosis and Discussion:    Headache: Differential diagnosis includes but is not limited to migraine, cluster headache, hypertension, tumor, subarachnoid bleeding, pseudotumor cerebri, temporal arteritis, infections, tension headache, and TMJ syndrome.    PROCEDURES:    Labs were collected in the emergency department and all labs were reviewed and interpreted by me.    No orders to display       Procedures    MDM     Amount and/or Complexity of Data Reviewed  Clinical lab tests: reviewed  Tests in the radiology section of CPT®: reviewed    The patient presented to the emergency department with a headache. The patient is now resting comfortably in feels better, is alert, talkative, interactive and in no distress after ED treatment. The patient appears well and is able to tolerate PO fluids. Repeat examination is unremarkable and benign. The patient is neurologically intact, has a normal mental status, and this ambulatory in the ED. The history, exam, diagnostic testing (if any) and the patient's current condition do not suggest meningitis, stroke, sepsis, subarachnoid hemorrhage, intracranial bleeding, encephalitis, temporal arteritis or other significant pathology to warrant further testing, continued ED treatment, admission, neurological consultation, for other specialist evaluation at this point. The vital signs have been stable. The patient's condition is stable and appropriate for discharge. The patient will pursue further outpatient evaluation with the primary care physician or other designated or consulting physician as indicated in the discharge instructions.                  Patient Care Considerations:    SEPSIS was considered but is NOT present in the emergency department as SIRS criteria is not present..      Consultants/Shared Management Plan:    SHARED VISIT: I have  discussed the case with my supervising physician, Dr. Costello who states Crees the treatment plan as outlined MDM and ED course. The substantive portion of the medical decision was made by the attesting physician who made or approve the management plan and will take responsibility for the patient.  Clinical findings were discussed and ultimate disposition was made in consult with supervising physician.    Social Determinants of Health:    Patient is independent, reliable, and has access to care.       Disposition and Care Coordination:    Discharged: I considered escalation of care by admitting this patient to the hospital, however patient does not meet sepsis or SIRS criteria    I have explained the patient´s condition, diagnoses and treatment plan based on the information available to me at this time. I have answered questions and addressed any concerns. The patient has a good  understanding of the patient´s diagnosis, condition, and treatment plan as can be expected at this point. The vital signs have been stable. The patient´s condition is stable and appropriate for discharge from the emergency department.      The patient will pursue further outpatient evaluation with the primary care physician or other designated or consulting physician as outlined in the discharge instructions. They are agreeable to this plan of care and follow-up instructions have been explained in detail. The patient has received these instructions in written format and has expressed an understanding of the discharge instructions. The patient is aware that any significant change in condition or worsening of symptoms should prompt an immediate return to this or the closest emergency department or call to 911.  I have explained discharge medications and the need for follow up with the patient/caretakers. This was also printed in the discharge instructions. Patient was discharged with the following medications and follow up:      Medication List         New Prescriptions      ondansetron ODT 4 MG disintegrating tablet  Commonly known as: ZOFRAN-ODT  Take 1 tablet by mouth 4 (Four) Times a Day As Needed for Nausea or Vomiting.               Where to Get Your Medications        These medications were sent to [facility]/pharmacy #[id number] - [place], [place] - [address] - [phone]  - [phone] FX  [address] [place] [address]      Hours: 24-hours Phone: [phone]   ondansetron ODT 4 MG disintegrating tablet      Eduard Minaya MD  [address] [place] [id number] [phone]    Schedule an appointment as soon as possible for a visit          Final diagnoses:   Migraine without status migrainosus, not intractable, unspecified migraine type        ED Disposition       ED Disposition   Discharge    Condition   Stable    Comment   --               This medical record created using voice recognition software.             Nate Barron, PARioC  05/17/25 [id number]

## 2025-05-17 NOTE — ED PROVIDER NOTES
"SHARED VISIT ATTESTATION:    This visit was performed by myself and an APC.  I performed the substantive portion of the medical decision making.  The management plan was made or approved by me, and I take responsibility for patient management.      SHARED VISIT NOTE:    Patient is 74 y.o. year old female that presents to the ED for evaluation of headache.     Physical Exam    ED Course:    /83 (BP Location: Left arm, Patient Position: Sitting)   Pulse 87   Temp 98.1 °F (36.7 °C) (Oral)   Resp 16   Ht 157.5 cm (62\")   Wt 53.1 kg (117 lb 1 oz)   SpO2 98%   BMI 21.41 kg/m²       The following orders were placed and all results were independently analyzed by me:  Orders Placed This Encounter   Procedures    CT Head Without Contrast    [place] Draw    Comprehensive Metabolic Panel    Magnesium    CBC Auto Differential    CBC & Differential    Green Top (Gel)    Lavender Top    Gold Top - SST    Light Blue Top       Medications Given in the Emergency Department:  Medications   dexAMETHasone sodium phosphate injection 10 mg (10 mg Intravenous Given 5/17/25 1909)   acetaminophen (TYLENOL) tablet 1,000 mg (1,000 mg Oral Given 5/17/25 1910)   metoclopramide (REGLAN) injection 10 mg (10 mg Intravenous Given 5/17/25 1910)        ED Course:         Labs:    Lab Results (last 24 hours)       Procedure Component Value Units Date/Time    CBC & Differential [[id number]]  (Abnormal) Collected: 05/17/25 1909    Specimen: Blood Updated: 05/17/25 1922    Narrative:      The following orders were created for panel order CBC & Differential.  Procedure                               Abnormality         Status                     ---------                               -----------         ------                     CBC Auto Differential[[id number]]        Abnormal            Final result                 Please view results for these tests on the individual orders.    Comprehensive Metabolic Panel [[id number]]  (Abnormal) Collected: " 05/17/25 1909    Specimen: Blood Updated: 05/17/25 1937     Glucose 134 mg/dL      BUN 16 mg/dL      Creatinine 0.81 mg/dL      Sodium 137 mmol/L      Potassium 3.9 mmol/L      Chloride 102 mmol/L      CO2 25.6 mmol/L      Calcium 9.4 mg/dL      Total Protein 7.4 g/dL      Albumin 3.6 g/dL      ALT (SGPT) 6 U/L      AST (SGOT) 12 U/L      Alkaline Phosphatase 96 U/L      Total Bilirubin 0.3 mg/dL      Globulin 3.8 gm/dL      A/G Ratio 0.9 g/dL      BUN/Creatinine Ratio 19.8     Anion Gap 9.4 mmol/L      eGFR 77.2 mL/min/1.73     Narrative:      GFR Categories in Chronic Kidney Disease (CKD)              GFR Category          GFR (mL/min/1.73)    Interpretation  G1                    90 or greater        Normal or high (1)  G2                    60-89                Mild decrease (1)  G3a                   45-59                Mild to moderate decrease  G3b                   30-44                Moderate to severe decrease  G4                    15-29                Severe decrease  G5                    14 or less           Kidney failure    (1)In the absence of evidence of kidney disease, neither GFR category G1 or G2 fulfill the criteria for CKD.    eGFR calculation 2021 CKD-EPI creatinine equation, which does not include race as a factor    Magnesium [[id number]]  (Normal) Collected: 05/17/25 1909    Specimen: Blood Updated: 05/17/25 1938     Magnesium 2.3 mg/dL     CBC Auto Differential [[id number]]  (Abnormal) Collected: 05/17/25 1909    Specimen: Blood Updated: 05/17/25 1922     WBC 5.17 10*3/mm3      RBC 3.40 10*6/mm3      Hemoglobin 10.9 g/dL      Hematocrit 33.9 %      MCV 99.7 fL      MCH 32.1 pg      MCHC 32.2 g/dL      RDW 12.4 %      RDW-SD 44.9 fl      MPV 9.1 fL      Platelets 285 10*3/mm3      Neutrophil % 66.5 %      Lymphocyte % 21.5 %      Monocyte % 9.1 %      Eosinophil % 1.9 %      Basophil % 0.8 %      Immature Grans % 0.2 %      Neutrophils, Absolute 3.44 10*3/mm3      Lymphocytes, Absolute  1.11 10*3/mm3      Monocytes, Absolute 0.47 10*3/mm3      Eosinophils, Absolute 0.10 10*3/mm3      Basophils, Absolute 0.04 10*3/mm3      Immature Grans, Absolute 0.01 10*3/mm3      nRBC 0.0 /100 WBC              Imaging:    CT Head Without Contrast  Result Date: 5/17/2025  CT HEAD WO CONTRAST Date of exam: 5/17/2025 7:25 PM EDT. Indication: HA (headache). Comparisons: 4/18/2023; 4/17/2023; 1/20/2014. TECHNIQUE: Axial CT images were obtained of the head without contrast administration. Reconstructed 2D coronal and sagittal images were also obtained. Automated exposure control and iterative construction methods were used. Streak artifacts obscure detail.  FINDINGS: A routine nonenhanced head CT was performed. No acute brain abnormality is seen. No acute infarct. No acute intracranial hemorrhage. No midline shift or acute intracranial mass effect is seen. The ventricular size and configuration are within normal limits. No acute skull fracture is identified. There may be mild chronic small vessel disease. The patient has undergone bilateral cataract extractions with intra-ocular lens implants. Degenerative changes involve the partially imaged cervical spine and the bilateral temporomandibular joints (TMJs). There is chronic leftward nasal septal deviation. Mild age-indeterminate mucosal thickening and/or retained secretions involve(s) the imaged paranasal sinuses. No air-fluid interfaces are seen within the imaged paranasal sinuses. There is a possible odontogenic origin of the left maxillary sinus disease.     No acute brain abnormality is seen. No acute infarct. No acute intracranial hemorrhage. Please see above comments for further detail.    Portions of this note were completed with a voice recognition program. Electronically Signed: Des Macedo MD  5/17/2025 7:40 PM EDT  Workstation ID: [id number]      MDM:    Procedures    Labs were collected in the emergency department and all labs were reviewed and interpreted  by me.  CT scan was performed in the emergency department and the CT scan radiology impression was interpreted by me.                     Rehan Costello MD  19:52 EDT  05/17/25         Rehan Costello MD  05/1950

## 2025-05-22 ENCOUNTER — TELEPHONE (OUTPATIENT)
Dept: CARDIOLOGY | Facility: CLINIC | Age: 75
End: 2025-05-22
Payer: MEDICARE

## 2025-05-22 NOTE — TELEPHONE ENCOUNTER
SW patient, patient BP/HR- 92/68- 99, patient has been feeling tired, weak with vision issues- patient states she hasn't felt good since she increased her losartan.     Patient advised on ER precautions if symptoms become severe. Patient verbalized understanding and appreciation.

## 2025-05-22 NOTE — TELEPHONE ENCOUNTER
Patient had her daughter call to report feeling badly since her b/p meds have been changed. Patient reports feeling dizzy, fatigued and having some vision changes. Phone called recorded and routed to RN. Patient is aware RN will call her back.

## 2025-05-23 NOTE — TELEPHONE ENCOUNTER
LONNY Patient. Went over recommendations and asked patient to keep BP over the next week, if it does not help let RN know. Patient verbalized understanding and appreciation.

## 2025-06-03 ENCOUNTER — HOSPITAL ENCOUNTER (OUTPATIENT)
Dept: GENERAL RADIOLOGY | Facility: HOSPITAL | Age: 75
Discharge: HOME | End: 2025-06-03
Payer: MEDICARE

## 2025-06-03 ENCOUNTER — TRANSCRIBE ORDERS (OUTPATIENT)
Dept: GENERAL RADIOLOGY | Facility: HOSPITAL | Age: 75
End: 2025-06-03
Payer: MEDICARE

## 2025-06-03 DIAGNOSIS — M54.50 LOW BACK PAIN WITHOUT SCIATICA, UNSPECIFIED BACK PAIN LATERALITY, UNSPECIFIED CHRONICITY: ICD-10-CM

## 2025-06-03 DIAGNOSIS — M54.50 LOW BACK PAIN WITHOUT SCIATICA, UNSPECIFIED BACK PAIN LATERALITY, UNSPECIFIED CHRONICITY: Primary | ICD-10-CM

## 2025-06-03 PROCEDURE — 72100 X-RAY EXAM L-S SPINE 2/3 VWS: CPT

## 2025-06-03 PROCEDURE — 72220 X-RAY EXAM SACRUM TAILBONE: CPT

## 2025-06-05 ENCOUNTER — OFFICE VISIT (OUTPATIENT)
Dept: GASTROENTEROLOGY | Facility: CLINIC | Age: 75
End: 2025-06-05
Payer: MEDICARE

## 2025-06-05 VITALS
BODY MASS INDEX: 21.46 KG/M2 | HEIGHT: 62 IN | SYSTOLIC BLOOD PRESSURE: 145 MMHG | WEIGHT: 116.6 LBS | DIASTOLIC BLOOD PRESSURE: 81 MMHG | HEART RATE: 80 BPM

## 2025-06-05 DIAGNOSIS — K22.4 ESOPHAGEAL SPASM: ICD-10-CM

## 2025-06-05 DIAGNOSIS — D64.9 ANEMIA, UNSPECIFIED TYPE: ICD-10-CM

## 2025-06-05 DIAGNOSIS — R13.19 ESOPHAGEAL DYSPHAGIA: ICD-10-CM

## 2025-06-05 DIAGNOSIS — K58.1 IRRITABLE BOWEL SYNDROME WITH CONSTIPATION: Primary | ICD-10-CM

## 2025-06-05 DIAGNOSIS — K29.70 GASTRITIS WITHOUT BLEEDING, UNSPECIFIED CHRONICITY, UNSPECIFIED GASTRITIS TYPE: ICD-10-CM

## 2025-06-05 RX ORDER — LUBIPROSTONE 8 UG/1
8 CAPSULE ORAL 2 TIMES DAILY WITH MEALS
Qty: 180 CAPSULE | Refills: 1 | Status: SHIPPED | OUTPATIENT
Start: 2025-06-05

## 2025-06-05 RX ORDER — SUMATRIPTAN 50 MG/1
TABLET, FILM COATED ORAL
COMMUNITY
Start: 2025-06-03

## 2025-06-05 NOTE — PROGRESS NOTES
Patient Name: Fanta Hawkins   Visit Date: 06/05/2025   Patient ID: [id number]  Provider: JIN Cortez    Sex: female  Location:  Location Address:  Location Phone: [phone]  [place] [address] [phone]    YOB: 1950  Age: 74 y.o.   Primary Care Provider Valentin Minaya MD      Referring Provider: No ref. provider found        Chief Complaint  Difficulty Swallowing and Anemia    History of Present Illness  Patient initially presented to 2025 with constipation for 3 to 4 months, she performs digital disimpaction she tried Linzess 145 mcg/day and felt this caused diarrhea; occasional dysphagia was on omeprazole 10 mg/day.  She reported being anemic for at least 1 year.  She reported a history of perforation with colonoscopy in 2012.  I recommended colonoscopy due to anemia and change in bowel pattern but she refused and we discussed the risk and benefits.  Changed Linzess to 72 mcg/day.  Patient was agreeable to EGD    EGD 3/19/2025: Abnormal motility noted in the esophagus, cricopharyngeus was abnormal, extra peristaltic waves and tertiary peristaltic waves are noted suspicious for esophageal spasm, diffuse gastritis noted-biopsy with mild chronic inactive gastritis otherwise negative, normal duodenum   History of Present Illness  The patient presents for evaluation of dysphagia, chronic constipation, and anemia.    She reports no significant changes in her health status. However, she has had an improvement in dysphagia sx. She is currently taking over-the-counter omeprazole 10 mg for acid reflux and heartburn.    She is also taking Linzess 72 mcg, which she finds to be slightly potent, occasionally resulting in diarrhea. Despite this, she expresses a need for the medication.    She has been informed of the presence of extensive scar tissue and expresses fear regarding potential complications from a colonoscopy.    She is on diclofenac for arthritis. She was not aware that she  should eat when taking diclofenac. . She cannot skip diclofenac because she could not get out of bed due to severe pain in her knees and all over her body. She was taken off diclofenac by another doctor but was put back on it because of pain.       Past Medical History:   Diagnosis Date    Anemia 2/20/2025    Anxiety     Arthritis     Asthma     Chest pain     COPD (chronic obstructive pulmonary disease) 08/15/2022    Essential hypertension 08/24/2022    Injury of back     Mood disorder     S/P exploratory laparotomy with lysis of adhesions 08/22/2022       Past Surgical History:   Procedure Laterality Date    BACK SURGERY      rods and pins    BLADDER SURGERY      CARDIAC CATHETERIZATION N/A 08/21/2024    Procedure: Right Heart Cath;  Surgeon: Arnol Britt MD;  Location: [facility] CATH INVASIVE LOCATION;  Service: Cardiology;  Laterality: N/A;    COLONOSCOPY      ENDOSCOPY N/A 03/19/2025    Procedure: ESOPHAGOGASTRODUODENOSCOPY with biopsies;  Surgeon: Eduard Hernandez MD;  Location: [facility] ENDOSCOPY;  Service: Gastroenterology;  Laterality: N/A;  gastritis    EXPLORATORY LAPAROTOMY N/A 03/19/2012    repair of colon perforation after colonoscopy    EXPLORATORY LAPAROTOMY N/A 08/19/2022    Procedure: LAPAROTOMY EXPLORATORY;  Surgeon: Snehal Medina MD;  Location: [facility] OR [facility];  Service: General;  Laterality: N/A;    EYE SURGERY      HYSTERECTOMY      UPPER GASTROINTESTINAL ENDOSCOPY         Allergies   Allergen Reactions    Indomethacin Palpitations and Shortness Of Breath    Meloxicam Itching and Shortness Of Breath     Other reaction(s): soa, itching    Moxifloxacin Seizure     Other reaction(s): seizures    Morphine Hives and Itching     Other reaction(s): hives       Family History   Problem Relation Age of Onset    Emphysema Mother     COPD Mother     Malig Hyperthermia Neg Hx     Colon cancer Neg Hx         Social History     Tobacco Use    Smoking status: Former     Current packs/day: 0.00  "    Average packs/day: 0.3 packs/day for 10.0 years (2.5 ttl pk-yrs)     Types: Cigarettes     Start date:      Quit date: 2022     Years since quittin.7     Passive exposure: Never    Smokeless tobacco: Never   Vaping Use    Vaping status: Never Used   Substance Use Topics    Alcohol use: Never    Drug use: Never       Objective     Vital Signs:   /81 (BP Location: Left arm, Patient Position: Sitting, Cuff Size: Adult)   Pulse 80   Ht 157.5 cm (62\")   Wt 52.9 kg (116 lb 9.6 oz)   BMI 21.33 kg/m²       Physical Exam  Constitutional:       General: The patient is not in acute distress.     Appearance: Normal appearance.   HENT:      Head: Normocephalic and atraumatic.      Nose: Nose normal.   Pulmonary:      Effort: Pulmonary effort is normal. No respiratory distress.   Abdominal:      General: Abdomen is flat.      Palpations: Abdomen is soft. There is no mass.      Tenderness: There is no abdominal tenderness. There is no guarding.   Musculoskeletal:      Cervical back: Neck supple.      Right lower leg: No edema.      Left lower leg: No edema.   Skin:     General: Skin is warm and dry.   Neurological:      General: No focal deficit present.      Mental Status: The patient is alert and oriented to person, place, and time.      Gait: Gait normal.   Psychiatric:         Mood and Affect: Mood normal.         Speech: Speech normal.         Behavior: Behavior normal.         Thought Content: Thought content normal.     Result Review :   The following data was reviewed by: JIN Cortez on 2025:    CBC w/diff          2024    08:09 2024    13:09 2024    13:16 2024    13:20 2025    19:09   CBC w/Diff   WBC 4.32     5.17    RBC 3.46     3.40    Hemoglobin 11.0  10.5  10.2  9.2  10.9    Hematocrit 33.9  31  30  27  33.9    MCV 98.0     99.7    MCH 31.8     32.1    MCHC 32.4     32.2    RDW 12.5     12.4    Platelets 247     285    Neutrophil Rel %     " 66.5    Immature Granulocyte Rel %     0.2    Lymphocyte Rel %     21.5    Monocyte Rel %     9.1    Eosinophil Rel %     1.9    Basophil Rel %     0.8      CMP          8/21/2024    08:09 5/17/2025    19:09   CMP   Glucose 95  134    BUN 22  16    Creatinine 0.80  0.81    EGFR 78.9  77.2    Sodium 139  137    Potassium 3.8  3.9    Chloride 104  102    Calcium 9.0  9.4    Total Protein  7.4    Albumin  3.6    Globulin  3.8    Total Bilirubin  0.3    Alkaline Phosphatase  96    AST (SGOT)  12    ALT (SGPT)  6    Albumin/Globulin Ratio  0.9    BUN/Creatinine Ratio 27.5  19.8    Anion Gap 6.9  9.4                    Assessment and Plan    Diagnoses and all orders for this visit:    1. Irritable bowel syndrome with constipation (Primary)    2. Anemia, unspecified type    3. Esophageal dysphagia    4. Gastritis without bleeding, unspecified chronicity, unspecified gastritis type    5. Esophageal spasm    Other orders  -     lubiprostone (Amitiza) 8 MCG capsule; Take 1 capsule by mouth 2 (Two) Times a Day With Meals.  Dispense: 180 capsule; Refill: 1          Assessment & Plan  1. Dysphagia:  - Esophageal spasms observed during endoscopy  - No abnormalities detected in the esophagus other than muscle spasms  - Mild gastritis noted in the stomach; biopsies normal  - Likely gastritis due to diclofenac for arthritis  - Consume food prior to taking diclofenac  - Continue omeprazole; increase dosage to two tablets if experiencing burning sensation or discomfort  - Declined low-dose blood pressure medication (CCB) for esophageal spasms  - Thoroughly chew food, alternate between solids and liquids, avoid extremely hot or cold temperatures    2. Chronic constipation:  - Linzess 72 mcg causing diarrhea  - Amitiza prescribed at the lowest dose  - Take Amitiza twice daily with meals (breakfast and dinner)  - Contact office if Amitiza is ineffective for dosage adjustment    3. Anemia:  - Persistent anemia with hemoglobin levels at  10.9 on 05/17/2025  - Upper gastrointestinal bleeding ruled out  - Declined colonoscopy.  We discussed the risks of unknown colon cancer w anemia, pt verbalized understanding.   - Discussed virtual colonoscopy or Cologuard stool test; remains apprehensive  - Contact office to schedule colonoscopy if decision changes            Follow Up   Return if symptoms worsen or fail to improve.    Patient or patient representative verbalized consent for the use of Ambient Listening during the visit with  JIN Cortez for chart documentation. 6/5/2025  10:46 EDT    Patient was given instructions and counseling regarding her condition or for health maintenance advice. Please see specific information pulled into the AVS if appropriate.

## 2025-06-10 ENCOUNTER — TELEPHONE (OUTPATIENT)
Dept: GASTROENTEROLOGY | Facility: CLINIC | Age: 75
End: 2025-06-10
Payer: MEDICARE

## 2025-06-10 NOTE — TELEPHONE ENCOUNTER
PA for Amitiza has been approved, attempted to contact pt to notify. Pt did not answer but I left a VM that this medication has been approved.

## 2025-06-20 ENCOUNTER — OFFICE VISIT (OUTPATIENT)
Dept: CARDIOLOGY | Facility: CLINIC | Age: 75
End: 2025-06-20
Payer: MEDICARE

## 2025-06-20 VITALS
BODY MASS INDEX: 21.44 KG/M2 | HEART RATE: 73 BPM | DIASTOLIC BLOOD PRESSURE: 96 MMHG | HEIGHT: 62 IN | WEIGHT: 116.5 LBS | SYSTOLIC BLOOD PRESSURE: 150 MMHG

## 2025-06-20 DIAGNOSIS — R07.9 CHEST PAIN, UNSPECIFIED TYPE: ICD-10-CM

## 2025-06-20 DIAGNOSIS — I10 ESSENTIAL HYPERTENSION: Primary | ICD-10-CM

## 2025-06-20 DIAGNOSIS — R06.09 DYSPNEA ON EXERTION: ICD-10-CM

## 2025-06-20 NOTE — PROGRESS NOTES
Chief Complaint  Follow-up and Hypertension    Subjective        Fanta Hawkins presents to [facility] CARDIOLOGY     History of Present Illness     Ms. Hawkins is a 74-year-old female with history of hypertension and COPD here today for cardiac follow-up on dyspnea on exertion and hypertension.  Patient reports since her last visit her dyspnea has subsided.  She does note that intermittently she has been having a chest heaviness.  She reports this can happens mainly when she is sitting at rest but can happen when she is walking as well.  Patient reports she does not monitor her blood pressure at home by herself.  She reports she will periodically have someone take her blood pressure when they visit.  She reports her PCP had stopped her carvedilol a couple months back due to having low blood pressures but she was started back on losartan 25 mg in May.  She had a couple episodes where her blood pressure was reading lower so she had been taking only half tablet daily.  Today her blood pressure is elevated.  She currently denies any dyspnea, palpitations, dizziness, syncope episodes or edema.      Past Medical History:   Diagnosis Date    Anemia 2/20/2025    Anxiety     Arthritis     Asthma     Chest pain     COPD (chronic obstructive pulmonary disease) 08/15/2022    Essential hypertension 08/24/2022    Injury of back     Mood disorder     S/P exploratory laparotomy with lysis of adhesions 08/22/2022       Allergies   Allergen Reactions    Indomethacin Palpitations and Shortness Of Breath    Meloxicam Itching and Shortness Of Breath     Other reaction(s): soa, itching    Moxifloxacin Seizure     Other reaction(s): seizures    Morphine Hives and Itching     Other reaction(s): hives        Past Surgical History:   Procedure Laterality Date    BACK SURGERY      rods and pins    BLADDER SURGERY      CARDIAC CATHETERIZATION N/A 08/21/2024    Procedure: Right Heart Cath;  Surgeon: Arnol Britt,  MD;  Location: [facility] CATH INVASIVE LOCATION;  Service: Cardiology;  Laterality: N/A;    COLONOSCOPY      ENDOSCOPY N/A 03/19/2025    Procedure: ESOPHAGOGASTRODUODENOSCOPY with biopsies;  Surgeon: Eduard Hernandez MD;  Location: [facility] ENDOSCOPY;  Service: Gastroenterology;  Laterality: N/A;  gastritis    EXPLORATORY LAPAROTOMY N/A 03/19/2012    repair of colon perforation after colonoscopy    EXPLORATORY LAPAROTOMY N/A 08/19/2022    Procedure: LAPAROTOMY EXPLORATORY;  Surgeon: Snehal Medina MD;  Location: [facility] OR [facility];  Service: General;  Laterality: N/A;    EYE SURGERY      HYSTERECTOMY      UPPER GASTROINTESTINAL ENDOSCOPY          Social History  She  reports that she quit smoking about 2 years ago. Her smoking use included cigarettes. She started smoking about 12 years ago. She has a 2.5 pack-year smoking history. She has never been exposed to tobacco smoke. She has never used smokeless tobacco. She reports that she does not drink alcohol and does not use drugs.    Family History  Her family history includes COPD in her mother; Emphysema in her mother.       Current Outpatient Medications on File Prior to Visit   Medication Sig    acetaminophen (TYLENOL) 650 MG 8 hr tablet TAKE 2 TABLETS BY MOUTH EVERY 8 HOURS FOR 15 DAYS AS NEEDED    albuterol sulfate HFA (Ventolin HFA) 108 (90 Base) MCG/ACT inhaler Every 4 (Four) Hours.    busPIRone (BUSPAR) 10 MG tablet Take 1 tablet by mouth 2 (Two) Times a Day As Needed.    carvedilol (COREG) 6.25 MG tablet Take 1 tablet by mouth 2 (Two) Times a Day With Meals.    cetirizine (zyrTEC) 10 MG tablet Take 1 tablet by mouth Daily.    diclofenac (VOLTAREN) 50 MG EC tablet Take 1 tablet by mouth Daily.    docusate sodium 100 MG capsule Take 1 capsule by mouth 2 (Two) Times a Day As Needed.    fluconazole (DIFLUCAN) 150 MG tablet Take one dose now, repeat in 7 days    fluticasone (FLONASE) 50 MCG/ACT nasal spray 1 spray by Each Nare route Daily As Needed.    gabapentin  "(NEURONTIN) 100 MG capsule Take 1 capsule by mouth 4 (Four) Times a Day.    HYDROcodone-acetaminophen (NORCO) 5-325 MG per tablet Take 1 tablet by mouth 3 (Three) Times a Day As Needed.    losartan (Cozaar) 25 MG tablet Take 1 tablet by mouth Daily.    lubiprostone (Amitiza) 8 MCG capsule Take 1 capsule by mouth 2 (Two) Times a Day With Meals.    meclizine (ANTIVERT) 25 MG tablet Take 1 tablet by mouth Daily As Needed for Dizziness.    ondansetron ODT (ZOFRAN-ODT) 4 MG disintegrating tablet Take 1 tablet by mouth 4 (Four) Times a Day As Needed for Nausea or Vomiting.    SUMAtriptan (IMITREX) 50 MG tablet     tiZANidine (ZANAFLEX) 4 MG tablet Take 1 tablet by mouth Daily As Needed for Muscle Spasms.     No current facility-administered medications on file prior to visit.         Review of Systems   Respiratory:  Negative for chest tightness and shortness of breath.    Cardiovascular:  Positive for chest pain (heaviness). Negative for palpitations and leg swelling.   Gastrointestinal:  Negative for nausea, vomiting and indigestion.   Neurological:  Negative for dizziness, syncope and light-headedness.        Objective   Vitals:    06/20/25 0900 06/20/25 0940   BP: 154/98 150/96   Pulse: 73    Weight: 52.8 kg (116 lb 8 oz)    Height: 157.5 cm (62.01\")          Physical Exam   General : Alert, awake, no acute distress  Neck : Supple, no carotid bruit, no jugular venous distention  CVS : Regular rate and rhythm, no murmur, no rubs or gallops  Lungs: Clear to auscultation bilaterally, no crackles or rhonchi  Abdomen: Soft, nontender, bowel sounds active  Extremities: Warm, well-perfused, no pedal edema      Result Review     The following data was reviewed by JIN Conway  No results found for: \"PROBNP\"  CMP          8/21/2024    08:09 5/17/2025    19:09   CMP   Glucose 95  134    BUN 22  16    Creatinine 0.80  0.81    EGFR 78.9  77.2    Sodium 139  137    Potassium 3.8  3.9    Chloride 104  102    Calcium 9.0  9.4  " "  Total Protein  7.4    Albumin  3.6    Globulin  3.8    Total Bilirubin  0.3    Alkaline Phosphatase  96    AST (SGOT)  12    ALT (SGPT)  6    Albumin/Globulin Ratio  0.9    BUN/Creatinine Ratio 27.5  19.8    Anion Gap 6.9  9.4      CBC w/diff          8/21/2024    08:09 8/21/2024    13:09 8/21/2024    13:16 8/21/2024    13:20 5/17/2025    19:09   CBC w/Diff   WBC 4.32     5.17    RBC 3.46     3.40    Hemoglobin 11.0  10.5  10.2  9.2  10.9    Hematocrit 33.9  31  30  27  33.9    MCV 98.0     99.7    MCH 31.8     32.1    MCHC 32.4     32.2    RDW 12.5     12.4    Platelets 247     285    Neutrophil Rel %     66.5    Immature Granulocyte Rel %     0.2    Lymphocyte Rel %     21.5    Monocyte Rel %     9.1    Eosinophil Rel %     1.9    Basophil Rel %     0.8       Lab Results   Component Value Date    TSH 0.770 08/23/2022      No results found for: \"FREET4\"   No results found for: \"DDIMERQUANT\"  Magnesium   Date Value Ref Range Status   05/17/2025 2.3 1.6 - 2.4 mg/dL Final      No results found for: \"DIGOXIN\"   Lab Results   Component Value Date    TROPONINT 10 (H) 04/17/2023                 Results for orders placed in visit on 10/13/23    Adult Transthoracic Echo Complete W/ Cont if Necessary Per Protocol    Interpretation Summary  Normal left ventricular systolic function.  Trace aortic regurgitation.  Trace MR.  Moderate tricuspid regurgitation with moderate pulmonary hypertension.  Estimated pulmonary artery systolic pressure around 45 to 50 mm.    Results for orders placed during the hospital encounter of 04/25/25    Treadmill Stress Test    Interpretation Summary    Blood pressure demonstrated a hypertensive response to stress.    Patient was only able to walk 3 minutes and 28 seconds on modified Xavier protocol achieving 2.53 metabolic equivalents.  Her heart rate increased from 78 bpm up to 119 bpm which was 80% of the maximum predicted heart rate.    Resting electrocardiogram showed normal sinus rhythm with " nonspecific ST-T wave changes.  Stress electrocardiogram showed no obvious significant change in her ST segments.    Patient became very weak and tired and almost not able to continue at 30 seconds on the treadmill.    Treadmill EKG shows fair chronotropic competence for low level exercise.  Her blood pressure became very hypertensive.  No obvious ischemia is noted but less sensitive due to baseline EKG changes.  Only other option would be to possibly consider left heart cath.        Procedures        Assessment and Plan   Diagnoses and all orders for this visit:    1. Essential hypertension (Primary)  Assessment & Plan:  Patient's blood pressure is slightly elevated at today's office visit.  She reports her PCP had stopped her carvedilol this year and was restarted on losartan 25 mg.  She reports she started having decrease in blood pressure and was advised to cut it in half but recently it has been elevated.  I recommended patient to take losartan 25 mg daily and monitor blood pressure for 1 to 2 weeks , advised patient she may upload to [employer] or drop off readings to the clinic for review.  Discussed if blood pressure is still staying elevated may need to increase dose at that time.      2. Chest pain, unspecified type  Assessment & Plan:  Patient reports she has intermittent midsternal chest pain.  She describes this as a heaviness that happens mainly when she is sitting at rest .  She reports this can happen at times when she is walking as well.  She reports her dyspnea has subsided.  Recent treadmill stress test shows fair chronotropic competence for low level exercise.  Her blood pressure became very hypertensive.  No obvious ischemia is noted but less sensitive due to baseline EKG changes.  Only other option would be to possibly consider left heart cath. Risk and benefits were discussed .  Patient at this time wants to give it some more consideration before agreeing to it.  She notes that if she decides  that she will call us and let us know so we can get that scheduled.      3. Dyspnea on exertion  Assessment & Plan:  Patient currently reports that she is not having any dyspnea on exertion.  She reports this is subsided.  She did have a right heart cath was normal.  Patient does have an appointment scheduled with pulmonary for evaluation September 2025.  Advised patient due to her past history with dyspnea encouraged her to continue with that appointment.                  Follow Up   Return in about 6 months (around 12/20/2025) for Dr Britt.    Fanta Hawkins  reports that she quit smoking about 2 years ago. Her smoking use included cigarettes. She started smoking about 12 years ago. She has a 2.5 pack-year smoking history. She has never been exposed to tobacco smoke. She has never used smokeless tobacco. I have educated her on the risk of diseases from using tobacco products such as cancer, COPD, and heart disease.                Patient was given instructions and counseling regarding her condition or for health maintenance advice. Please see specific information pulled into the AVS if appropriate.     Signed,  JIN Conway  06/20/2025     Dictated Utilizing Dragon Dictation: Please note that portions of this note were completed with a voice recognition program.  Part of this note may be an electronic transcription/translation of spoken language to printed text using the Dragon Dictation System.

## 2025-06-20 NOTE — ASSESSMENT & PLAN NOTE
Patient reports she has intermittent midsternal chest pain.  She describes this as a heaviness that happens mainly when she is sitting at rest .  She reports this can happen at times when she is walking as well.  She reports her dyspnea has subsided.  Recent treadmill stress test shows fair chronotropic competence for low level exercise.  Her blood pressure became very hypertensive.  No obvious ischemia is noted but less sensitive due to baseline EKG changes.  Only other option would be to possibly consider left heart cath. Risk and benefits were discussed .  Patient at this time wants to give it some more consideration before agreeing to it.  She notes that if she decides that she will call us and let us know so we can get that scheduled.

## 2025-06-20 NOTE — ASSESSMENT & PLAN NOTE
Patient's blood pressure is slightly elevated at today's office visit.  She reports her PCP had stopped her carvedilol this year and was restarted on losartan 25 mg.  She reports she started having decrease in blood pressure and was advised to cut it in half but recently it has been elevated.  I recommended patient to take losartan 25 mg daily and monitor blood pressure for 1 to 2 weeks , advised patient she may upload to [facility] or drop off readings to the clinic for review.  Discussed if blood pressure is still staying elevated may need to increase dose at that time.

## 2025-06-20 NOTE — ASSESSMENT & PLAN NOTE
Patient currently reports that she is not having any dyspnea on exertion.  She reports this is subsided.  She did have a right heart cath was normal.  Patient does have an appointment scheduled with pulmonary for evaluation September 2025.  Advised patient due to her past history with dyspnea encouraged her to continue with that appointment.

## (undated) DEVICE — Device

## (undated) DEVICE — SOL IRR NACL 0.9PCT BT 1000ML

## (undated) DEVICE — ANTIBACTERIAL VIOLET BRAIDED (POLYGLACTIN 910), SYNTHETIC ABSORBABLE SUTURE: Brand: COATED VICRYL

## (undated) DEVICE — GLIDESHEATH SLENDER STAINLESS STEEL KIT: Brand: GLIDESHEATH SLENDER

## (undated) DEVICE — BALN WEDGE/PRESS 1L .035IN 6F 10MM 90CM

## (undated) DEVICE — INTENDED FOR TISSUE SEPARATION, AND OTHER PROCEDURES THAT REQUIRE A SHARP SURGICAL BLADE TO PUNCTURE OR CUT.: Brand: BARD-PARKER ® CARBON RIB-BACK BLADES

## (undated) DEVICE — MAJOR-LF: Brand: MEDLINE INDUSTRIES, INC.

## (undated) DEVICE — SOLIDIFIER LIQLOC PLS 1500CC BT

## (undated) DEVICE — TOTAL TRAY, 16FR 10ML SIL FOLEY, URN: Brand: MEDLINE

## (undated) DEVICE — ELECTRD BLD EDGE COAT 3IN

## (undated) DEVICE — SLV SCD KN/LEN ADJ EXPRSS BLENDED MD 1P/U

## (undated) DEVICE — GOWN,REINFORCE,POLY,SIRUS,BREATH SLV,XLG: Brand: MEDLINE

## (undated) DEVICE — CONN JET HYDRA H20 AUXILIARY DISP

## (undated) DEVICE — STERILE POLYISOPRENE POWDER-FREE SURGICAL GLOVES WITH EMOLLIENT COATING: Brand: PROTEXIS

## (undated) DEVICE — DRSNG WND BORDR/ADHS NONADHR/GZ LF 4X14IN STRL

## (undated) DEVICE — SUT SILK 3/0 SH CR8 18IN C013D

## (undated) DEVICE — PENCL E/S SMOKEEVAC W/TELESCP CANN

## (undated) DEVICE — BLCK/BITE BLOX WO/DENTL/RIM W/STRAP 54F

## (undated) DEVICE — PROXIMATE RH ROTATING HEAD SKIN STAPLERS (35 WIDE) CONTAINS 35 STAINLESS STEEL STAPLES: Brand: PROXIMATE

## (undated) DEVICE — LINER SURG CANSTR SXN S/RIGD 1500CC

## (undated) DEVICE — DEFENDO AIR WATER SUCTION AND BIOPSY VALVE KIT FOR  OLYMPUS: Brand: DEFENDO AIR/WATER/SUCTION AND BIOPSY VALVE

## (undated) DEVICE — THE STERILE LIGHT HANDLE COVER IS USED WITH STERIS SURGICAL LIGHTING AND VISUALIZATION SYSTEMS.

## (undated) DEVICE — ELECTRD BLD EDGE/STD 1P 2.4X6.35MM STRL

## (undated) DEVICE — SUT SILK 2/0 TIES 18IN A185H

## (undated) DEVICE — SINGLE-USE BIOPSY FORCEPS: Brand: RADIAL JAW 4

## (undated) DEVICE — SOL IRRG H2O PL/BG 1000ML STRL

## (undated) DEVICE — SWAN-GANZ TRUE SIZE THERMODILUTION "S" TIP: Brand: SWAN-GANZ TRUE SIZE

## (undated) DEVICE — SUT PDS 1 XLH LP 99IN Z881G